# Patient Record
Sex: FEMALE | Race: WHITE | Employment: UNEMPLOYED | URBAN - METROPOLITAN AREA
[De-identification: names, ages, dates, MRNs, and addresses within clinical notes are randomized per-mention and may not be internally consistent; named-entity substitution may affect disease eponyms.]

---

## 2019-03-13 ENCOUNTER — HOSPITAL ENCOUNTER (OUTPATIENT)
Age: 29
Discharge: HOME | End: 2019-03-13
Payer: COMMERCIAL

## 2019-03-13 LAB
ANION GAP SERPL CALCULATED.3IONS-SCNC: 11 MMOL/L (ref 9–17)
BUN BLDV-MCNC: 8 MG/DL (ref 6–20)
BUN/CREAT BLD: ABNORMAL (ref 9–20)
CALCIUM SERPL-MCNC: 9.7 MG/DL (ref 8.6–10.4)
CHLORIDE BLD-SCNC: 105 MMOL/L (ref 98–107)
CO2: 26 MMOL/L (ref 20–31)
CREAT SERPL-MCNC: 0.68 MG/DL (ref 0.5–0.9)
GFR AFRICAN AMERICAN: >60 ML/MIN
GFR NON-AFRICAN AMERICAN: >60 ML/MIN
GFR SERPL CREATININE-BSD FRML MDRD: ABNORMAL ML/MIN/{1.73_M2}
GFR SERPL CREATININE-BSD FRML MDRD: ABNORMAL ML/MIN/{1.73_M2}
GLUCOSE BLD-MCNC: 107 MG/DL (ref 70–99)
POTASSIUM SERPL-SCNC: 4.4 MMOL/L (ref 3.7–5.3)
SODIUM BLD-SCNC: 142 MMOL/L (ref 135–144)
T3 FREE: 3.9 PG/ML (ref 2.02–4.43)
THYROXINE, FREE: 1.3 NG/DL (ref 0.93–1.7)
TSH SERPL DL<=0.05 MIU/L-ACNC: 1.48 MIU/L (ref 0.3–5)

## 2019-03-13 PROCEDURE — 36415 COLL VENOUS BLD VENIPUNCTURE: CPT

## 2019-03-13 PROCEDURE — 84439 ASSAY OF FREE THYROXINE: CPT

## 2019-03-13 PROCEDURE — 84443 ASSAY THYROID STIM HORMONE: CPT

## 2019-03-13 PROCEDURE — 80048 BASIC METABOLIC PNL TOTAL CA: CPT

## 2019-03-13 PROCEDURE — 84481 FREE ASSAY (FT-3): CPT

## 2019-06-11 ENCOUNTER — OFFICE VISIT (OUTPATIENT)
Dept: OBGYN CLINIC | Age: 29
End: 2019-06-11
Payer: COMMERCIAL

## 2019-06-11 ENCOUNTER — HOSPITAL ENCOUNTER (OUTPATIENT)
Age: 29
Discharge: HOME | End: 2019-06-11
Payer: COMMERCIAL

## 2019-06-11 VITALS
SYSTOLIC BLOOD PRESSURE: 98 MMHG | HEIGHT: 62 IN | WEIGHT: 107 LBS | HEART RATE: 62 BPM | BODY MASS INDEX: 19.69 KG/M2 | DIASTOLIC BLOOD PRESSURE: 60 MMHG

## 2019-06-11 DIAGNOSIS — O03.9 SAB (SPONTANEOUS ABORTION): Primary | ICD-10-CM

## 2019-06-11 DIAGNOSIS — O03.9 SAB (SPONTANEOUS ABORTION): ICD-10-CM

## 2019-06-11 LAB — HCG QUANTITATIVE: 6685 IU/L

## 2019-06-11 PROCEDURE — G8420 CALC BMI NORM PARAMETERS: HCPCS | Performed by: OBSTETRICS & GYNECOLOGY

## 2019-06-11 PROCEDURE — G8427 DOCREV CUR MEDS BY ELIG CLIN: HCPCS | Performed by: OBSTETRICS & GYNECOLOGY

## 2019-06-11 PROCEDURE — 99203 OFFICE O/P NEW LOW 30 MIN: CPT | Performed by: OBSTETRICS & GYNECOLOGY

## 2019-06-11 PROCEDURE — 84702 CHORIONIC GONADOTROPIN TEST: CPT

## 2019-06-11 PROCEDURE — 36415 COLL VENOUS BLD VENIPUNCTURE: CPT

## 2019-06-11 PROCEDURE — 4004F PT TOBACCO SCREEN RCVD TLK: CPT | Performed by: OBSTETRICS & GYNECOLOGY

## 2019-06-11 NOTE — PROGRESS NOTES
Calcium 9.7 8.6 - 10.4 mg/dL    Sodium 142 135 - 144 mmol/L    Potassium 4.4 3.7 - 5.3 mmol/L    Chloride 105 98 - 107 mmol/L    CO2 26 20 - 31 mmol/L    Anion Gap 11 9 - 17 mmol/L    GFR Non-African American >60 >60 mL/min    GFR African American >60 >60 mL/min    GFR Comment          GFR Staging NOT REPORTED    T3, Free   Result Value Ref Range    T3, Free 3.90 2.02 - 4.43 pg/mL   T4, Free   Result Value Ref Range    Thyroxine, Free 1.30 0.93 - 1.70 ng/dL   TSH without Reflex   Result Value Ref Range    TSH 1.48 0.30 - 5.00 mIU/L           Assessment:   Diagnosis Orders   1. SAB (spontaneous )  Type And Screen    HCG, Quantitative, Pregnancy     Chief Complaint   Patient presents with    New Patient         Patient Active Problem List    Diagnosis Date Noted    IBS (irritable bowel syndrome)        PLAN:  Return in about 9 months (around 3/11/2020) for Annual.   T&S ordered & Qhcg ordered  Family planning and conception-barrier recs  Stop Smoking-cessation reviewd  Annual completed by [facility] reviewed  Counseled on preventative health maintenance follow-up. Orders Placed This Encounter   Procedures    HCG, Quantitative, Pregnancy     Standing Status:   Future     Standing Expiration Date:   6/10/2020    Type And Screen     Standing Status:   Future     Standing Expiration Date:   2020       Patient was seen with total face to face time of 30 minutes. More than 50% of this visit was counseling and education regarding The encounter diagnosis was SAB (spontaneous ). and New Patient   as well as  counseling on preventative health maintenance follow-up.

## 2019-06-12 ENCOUNTER — TELEPHONE (OUTPATIENT)
Dept: OBGYN CLINIC | Age: 29
End: 2019-06-12

## 2019-06-12 DIAGNOSIS — O03.9 SPONTANEOUS ABORTION: Primary | ICD-10-CM

## 2019-06-12 DIAGNOSIS — Z34.90 EARLY STAGE OF PREGNANCY: ICD-10-CM

## 2019-06-14 ENCOUNTER — HOSPITAL ENCOUNTER (OUTPATIENT)
Age: 29
Discharge: HOME | End: 2019-06-14
Payer: COMMERCIAL

## 2019-06-14 DIAGNOSIS — O03.9 SPONTANEOUS ABORTION: ICD-10-CM

## 2019-06-14 LAB — HCG QUANTITATIVE: ABNORMAL IU/L

## 2019-06-14 PROCEDURE — 84702 CHORIONIC GONADOTROPIN TEST: CPT

## 2019-06-14 PROCEDURE — 36415 COLL VENOUS BLD VENIPUNCTURE: CPT

## 2019-06-17 ENCOUNTER — TELEPHONE (OUTPATIENT)
Dept: OBGYN CLINIC | Age: 29
End: 2019-06-17

## 2019-06-17 DIAGNOSIS — Z34.90 EARLY STAGE OF PREGNANCY: Primary | ICD-10-CM

## 2019-06-17 NOTE — TELEPHONE ENCOUNTER
Spoke with patient as requested by Dionne Shi CNP, in regards to positive blood pregnancy test and the indication for an ultrasound to determine viability and new ob intake appointment. Patient verbalized an understanding of results and plan of care. Transferred to  staff for scheduling. Ultrasound ordered. Patient stated that she will purchase OTC prenatal gummies, stating that she cannot take prescription prenatal pills.

## 2019-06-17 NOTE — TELEPHONE ENCOUNTER
----- Message from GRACIELA Reyes CNP sent at 6/17/2019  7:59 AM EDT -----  Please get OB ultrasound for fetal viability and dates. Prenatal vitamin 1 pO QD with 12 refills.   Patient will need new ob appointment scheduled

## 2019-06-17 NOTE — TELEPHONE ENCOUNTER
----- Message from GRACIELA Estevez CNP sent at 6/17/2019  7:59 AM EDT -----  Please get OB ultrasound for fetal viability and dates. Prenatal vitamin 1 pO QD with 12 refills.   Patient will need new ob appointment scheduled

## 2019-06-17 NOTE — TELEPHONE ENCOUNTER
Attempted to reach patient in regards to  need for OB ultrasound to determine fetal viability and dates, as requested by Nancy Rader CNP. Per provider, Prenatal vitamin 1 pO QD with 12 refills. Patient will need new ob appointment scheduled. Patient did not answer, voicemail full.  Currently awaiting call back from missed calls.

## 2019-06-19 ENCOUNTER — OFFICE VISIT (OUTPATIENT)
Dept: OBGYN CLINIC | Age: 29
End: 2019-06-19
Payer: COMMERCIAL

## 2019-06-19 DIAGNOSIS — Z34.90 EARLY STAGE OF PREGNANCY: ICD-10-CM

## 2019-06-19 LAB
CRL: NORMAL CM
SAC DIAMETER: NORMAL CM

## 2019-06-19 PROCEDURE — 76801 OB US < 14 WKS SINGLE FETUS: CPT | Performed by: OBSTETRICS & GYNECOLOGY

## 2019-06-20 ENCOUNTER — TELEPHONE (OUTPATIENT)
Dept: OBGYN CLINIC | Age: 29
End: 2019-06-20

## 2019-06-21 ENCOUNTER — TELEPHONE (OUTPATIENT)
Dept: OBGYN CLINIC | Age: 29
End: 2019-06-21

## 2019-06-21 RX ORDER — ONDANSETRON 4 MG/1
4 TABLET, FILM COATED ORAL EVERY 8 HOURS PRN
Qty: 12 TABLET | Refills: 0 | Status: SHIPPED | OUTPATIENT
Start: 2019-06-21 | End: 2019-07-15 | Stop reason: SDUPTHER

## 2019-06-21 NOTE — LETTER
[id number] [facility]  [address]  [address] [facility] [address]  Phone: [phone]  Fax: [phone] [facility], APRN - CNP        June 21, 2019    Vicky Sarah  [address]      To Whom it may concern:    Please allow patient to have a 15 minute break every 2 hours to allow her to get a drink and a snack due to pregnancy. If you have any questions or concerns, please don't hesitate to call.     Sincerely,        Patel Bowen, GRACIELA - CNP

## 2019-06-21 NOTE — TELEPHONE ENCOUNTER
Patient called to see if she could move her appt for her initial OB intake because she is having issues with nausea and vomiting. I told patient we like them to come in around 10 weeks for proper viability so she is keeping her appt. Patient states that she is keeping stuff down, but nothing sounds good. She states she is not getting proper breaks at work to be able to walk away to get something to drink or a frequent snack. 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 .. She was perscribed zofran her previous pregnancy by Dr. Gil Ganser and has tried b6 and unisom OTC which has not worked for her. Patient states she was also on a steroid from urgent care along with an ATB from dentist and she states that she is still having itching. .. She was placed on it because she had an allergic reaction and wants to know if there is anything she should or could do. Per Steve Zamora she can have 25mg of bendryl Q6H PRN for itching. Its okay to perscribe #12 tablets. Steve Zamora states she can do the zofran, but patient if aware of the risks of possible defects in early pregnancy associated with zofran. Per Steve Zamora zofran 4mg Q8hrs #12 tablets. Steve Zamora states a note for work is okay as well for snacks and drink break Q2hrs. Patient would like the note and zofran. Declines the benedryl.      The fax # to send the note is [phone]

## 2019-07-08 ENCOUNTER — HOSPITAL ENCOUNTER (EMERGENCY)
Age: 29
Discharge: HOME | End: 2019-07-08
Attending: EMERGENCY MEDICINE
Payer: COMMERCIAL

## 2019-07-08 VITALS
HEIGHT: 63 IN | WEIGHT: 105 LBS | RESPIRATION RATE: 16 BRPM | TEMPERATURE: 98.2 F | OXYGEN SATURATION: 100 % | BODY MASS INDEX: 18.61 KG/M2 | HEART RATE: 66 BPM | DIASTOLIC BLOOD PRESSURE: 57 MMHG | SYSTOLIC BLOOD PRESSURE: 92 MMHG

## 2019-07-08 DIAGNOSIS — R11.2 NON-INTRACTABLE VOMITING WITH NAUSEA, UNSPECIFIED VOMITING TYPE: Primary | ICD-10-CM

## 2019-07-08 LAB
ABSOLUTE EOS #: 0 K/UL (ref 0–0.4)
ABSOLUTE IMMATURE GRANULOCYTE: ABNORMAL K/UL (ref 0–0.3)
ABSOLUTE LYMPH #: 1.3 K/UL (ref 1–4.8)
ABSOLUTE MONO #: 0.8 K/UL (ref 0.1–1.3)
ALBUMIN SERPL-MCNC: 4.8 G/DL (ref 3.5–5.2)
ALBUMIN/GLOBULIN RATIO: NORMAL (ref 1–2.5)
ALP BLD-CCNC: 48 U/L (ref 35–104)
ALT SERPL-CCNC: 12 U/L (ref 5–33)
ANION GAP SERPL CALCULATED.3IONS-SCNC: 14 MMOL/L (ref 9–17)
AST SERPL-CCNC: 17 U/L
BASOPHILS # BLD: 1 % (ref 0–2)
BASOPHILS ABSOLUTE: 0.1 K/UL (ref 0–0.2)
BILIRUB SERPL-MCNC: 0.5 MG/DL (ref 0.3–1.2)
BUN BLDV-MCNC: 6 MG/DL (ref 6–20)
BUN/CREAT BLD: NORMAL (ref 9–20)
CALCIUM SERPL-MCNC: 10.1 MG/DL (ref 8.6–10.4)
CHLORIDE BLD-SCNC: 101 MMOL/L (ref 98–107)
CO2: 24 MMOL/L (ref 20–31)
CREAT SERPL-MCNC: 0.5 MG/DL (ref 0.5–0.9)
DIFFERENTIAL TYPE: ABNORMAL
EOSINOPHILS RELATIVE PERCENT: 0 % (ref 0–4)
GFR AFRICAN AMERICAN: >60 ML/MIN
GFR NON-AFRICAN AMERICAN: >60 ML/MIN
GFR SERPL CREATININE-BSD FRML MDRD: NORMAL ML/MIN/{1.73_M2}
GFR SERPL CREATININE-BSD FRML MDRD: NORMAL ML/MIN/{1.73_M2}
GLUCOSE BLD-MCNC: 99 MG/DL (ref 70–99)
HCT VFR BLD CALC: 43.9 % (ref 36–46)
HEMOGLOBIN: 14.9 G/DL (ref 12–16)
IMMATURE GRANULOCYTES: ABNORMAL %
LYMPHOCYTES # BLD: 14 % (ref 24–44)
MCH RBC QN AUTO: 30.5 PG (ref 26–34)
MCHC RBC AUTO-ENTMCNC: 34 G/DL (ref 31–37)
MCV RBC AUTO: 89.7 FL (ref 80–100)
MONOCYTES # BLD: 8 % (ref 1–7)
NRBC AUTOMATED: ABNORMAL PER 100 WBC
PDW BLD-RTO: 13.9 % (ref 11.5–14.9)
PLATELET # BLD: 189 K/UL (ref 150–450)
PLATELET ESTIMATE: ABNORMAL
PMV BLD AUTO: 8 FL (ref 6–12)
POTASSIUM SERPL-SCNC: 3.8 MMOL/L (ref 3.7–5.3)
RBC # BLD: 4.89 M/UL (ref 4–5.2)
RBC # BLD: ABNORMAL 10*6/UL
SEG NEUTROPHILS: 77 % (ref 36–66)
SEGMENTED NEUTROPHILS ABSOLUTE COUNT: 7.4 K/UL (ref 1.3–9.1)
SODIUM BLD-SCNC: 139 MMOL/L (ref 135–144)
TOTAL PROTEIN: 8 G/DL (ref 6.4–8.3)
WBC # BLD: 9.5 K/UL (ref 3.5–11)
WBC # BLD: ABNORMAL 10*3/UL

## 2019-07-08 PROCEDURE — 85025 COMPLETE CBC W/AUTO DIFF WBC: CPT

## 2019-07-08 PROCEDURE — 2580000003 HC RX 258: Performed by: EMERGENCY MEDICINE

## 2019-07-08 PROCEDURE — 80053 COMPREHEN METABOLIC PANEL: CPT

## 2019-07-08 PROCEDURE — 36415 COLL VENOUS BLD VENIPUNCTURE: CPT

## 2019-07-08 PROCEDURE — 96374 THER/PROPH/DIAG INJ IV PUSH: CPT

## 2019-07-08 PROCEDURE — 6360000002 HC RX W HCPCS: Performed by: EMERGENCY MEDICINE

## 2019-07-08 PROCEDURE — 99284 EMERGENCY DEPT VISIT MOD MDM: CPT

## 2019-07-08 RX ORDER — ONDANSETRON 2 MG/ML
4 INJECTION INTRAMUSCULAR; INTRAVENOUS ONCE
Status: COMPLETED | OUTPATIENT
Start: 2019-07-08 | End: 2019-07-08

## 2019-07-08 RX ORDER — 0.9 % SODIUM CHLORIDE 0.9 %
1000 INTRAVENOUS SOLUTION INTRAVENOUS ONCE
Status: COMPLETED | OUTPATIENT
Start: 2019-07-08 | End: 2019-07-08

## 2019-07-08 RX ADMIN — ONDANSETRON 4 MG: 2 INJECTION INTRAMUSCULAR; INTRAVENOUS at 10:44

## 2019-07-08 RX ADMIN — SODIUM CHLORIDE 1000 ML: 9 INJECTION, SOLUTION INTRAVENOUS at 10:44

## 2019-07-08 ASSESSMENT — ENCOUNTER SYMPTOMS
COUGH: 0
EYES NEGATIVE: 1
NAUSEA: 1
RESPIRATORY NEGATIVE: 1
SHORTNESS OF BREATH: 0
VOMITING: 1
BACK PAIN: 0

## 2019-07-08 NOTE — ED PROVIDER NOTES
EMERGENCY DEPARTMENT ENCOUNTER    Pt Name: Gaurav Nesbitt  MRN: [id number]  [place] 1990  Date of evaluation: 19  CHIEF COMPLAINT       Chief Complaint   Patient presents with    Nausea     10 weeks pregnant    Emesis    Rash     HISTORY OF PRESENT ILLNESS   The pt presents for evaluation of nausea and vomiting. Started about 10 weeks ago, pt is approx 10 weeks pregnant. She is taking zofran already but it is not helping much. No fever, no diarrhea. No vaginal bleeding or discharge. Pt has occ mild abd cramping. Prior us negative for ectopic. The history is provided by the patient. Nausea & Vomiting   Severity:  Moderate  Duration:  10 weeks  Timing:  Intermittent  Quality:  Stomach contents  Progression:  Unchanged  Chronicity:  New  Relieved by:  Nothing  Worsened by:  Nothing  Ineffective treatments:  Antiemetics  Associated symptoms: no chills, no cough, no fever and no headaches        REVIEW OF SYSTEMS     Review of Systems   Constitutional: Negative. Negative for chills and fever. HENT: Negative. Negative for congestion. Eyes: Negative. Respiratory: Negative. Negative for cough and shortness of breath. Cardiovascular: Negative. Negative for chest pain. Gastrointestinal: Positive for nausea and vomiting. Genitourinary: Negative. Musculoskeletal: Negative. Negative for back pain. Skin: Negative. Negative for rash. Neurological: Negative. Negative for headaches. All other systems reviewed and are negative.     PASTMEDICAL HISTORY     Past Medical History:   Diagnosis Date    IBS (irritable bowel syndrome)      SURGICAL HISTORY       Past Surgical History:   Procedure Laterality Date     SECTION       CURRENT MEDICATIONS       Discharge Medication List as of 2019 11:30 AM      CONTINUE these medications which have NOT CHANGED    Details   Prenatal MV-Min-Fe Fum-FA-DHA (PRENATAL 1 PO) Take by mouthHistorical Med      ondansetron (ZOFRAN) 4 MG

## 2019-07-10 ENCOUNTER — HOSPITAL ENCOUNTER (OUTPATIENT)
Age: 29
Discharge: HOME | End: 2019-07-10
Payer: COMMERCIAL

## 2019-07-10 ENCOUNTER — INITIAL PRENATAL (OUTPATIENT)
Dept: OBGYN CLINIC | Age: 29
End: 2019-07-10

## 2019-07-10 VITALS
SYSTOLIC BLOOD PRESSURE: 98 MMHG | DIASTOLIC BLOOD PRESSURE: 60 MMHG | BODY MASS INDEX: 18.25 KG/M2 | WEIGHT: 103 LBS | HEART RATE: 84 BPM

## 2019-07-10 DIAGNOSIS — O09.91 HIGH-RISK PREGNANCY IN FIRST TRIMESTER: Primary | ICD-10-CM

## 2019-07-10 DIAGNOSIS — Z34.90 EARLY STAGE OF PREGNANCY: Primary | ICD-10-CM

## 2019-07-10 DIAGNOSIS — Z34.90 EARLY STAGE OF PREGNANCY: ICD-10-CM

## 2019-07-10 PROBLEM — E03.8 TSH DEFICIENCY: Status: ACTIVE | Noted: 2019-07-10

## 2019-07-10 LAB
ABO/RH: NORMAL
ABSOLUTE EOS #: 0 K/UL (ref 0–0.4)
ABSOLUTE IMMATURE GRANULOCYTE: ABNORMAL K/UL (ref 0–0.3)
ABSOLUTE LYMPH #: 1.3 K/UL (ref 1–4.8)
ABSOLUTE MONO #: 0.7 K/UL (ref 0.1–1.3)
ANTIBODY SCREEN: NEGATIVE
BASOPHILS # BLD: 1 % (ref 0–2)
BASOPHILS ABSOLUTE: 0 K/UL (ref 0–0.2)
DIFFERENTIAL TYPE: ABNORMAL
EOSINOPHILS RELATIVE PERCENT: 0 % (ref 0–4)
GLUCOSE BLD-MCNC: 86 MG/DL (ref 70–99)
HCT VFR BLD CALC: 41.7 % (ref 36–46)
HEMOGLOBIN: 14.1 G/DL (ref 12–16)
HEPATITIS B SURFACE ANTIGEN: NONREACTIVE
HIV AG/AB: NONREACTIVE
IMMATURE GRANULOCYTES: ABNORMAL %
LYMPHOCYTES # BLD: 17 % (ref 24–44)
MCH RBC QN AUTO: 30.8 PG (ref 26–34)
MCHC RBC AUTO-ENTMCNC: 33.8 G/DL (ref 31–37)
MCV RBC AUTO: 91.2 FL (ref 80–100)
MONOCYTES # BLD: 9 % (ref 1–7)
NRBC AUTOMATED: ABNORMAL PER 100 WBC
PDW BLD-RTO: 13.8 % (ref 11.5–14.9)
PLATELET # BLD: 176 K/UL (ref 150–450)
PLATELET ESTIMATE: ABNORMAL
PMV BLD AUTO: 8.2 FL (ref 6–12)
RBC # BLD: 4.58 M/UL (ref 4–5.2)
RBC # BLD: ABNORMAL 10*6/UL
RUBV IGG SER QL: 171.9 IU/ML
SEG NEUTROPHILS: 73 % (ref 36–66)
SEGMENTED NEUTROPHILS ABSOLUTE COUNT: 5.6 K/UL (ref 1.3–9.1)
THYROXINE, FREE: 1.34 NG/DL (ref 0.93–1.7)
TSH SERPL DL<=0.05 MIU/L-ACNC: 0.12 MIU/L (ref 0.3–5)
WBC # BLD: 7.6 K/UL (ref 3.5–11)
WBC # BLD: ABNORMAL 10*3/UL

## 2019-07-10 PROCEDURE — 86850 RBC ANTIBODY SCREEN: CPT

## 2019-07-10 PROCEDURE — 84443 ASSAY THYROID STIM HORMONE: CPT

## 2019-07-10 PROCEDURE — 86901 BLOOD TYPING SEROLOGIC RH(D): CPT

## 2019-07-10 PROCEDURE — 36415 COLL VENOUS BLD VENIPUNCTURE: CPT

## 2019-07-10 PROCEDURE — 87086 URINE CULTURE/COLONY COUNT: CPT

## 2019-07-10 PROCEDURE — 86762 RUBELLA ANTIBODY: CPT

## 2019-07-10 PROCEDURE — 85025 COMPLETE CBC W/AUTO DIFF WBC: CPT

## 2019-07-10 PROCEDURE — 84439 ASSAY OF FREE THYROXINE: CPT

## 2019-07-10 PROCEDURE — 82947 ASSAY GLUCOSE BLOOD QUANT: CPT

## 2019-07-10 PROCEDURE — 87389 HIV-1 AG W/HIV-1&-2 AB AG IA: CPT

## 2019-07-10 PROCEDURE — 86780 TREPONEMA PALLIDUM: CPT

## 2019-07-10 PROCEDURE — 86900 BLOOD TYPING SEROLOGIC ABO: CPT

## 2019-07-10 PROCEDURE — 87340 HEPATITIS B SURFACE AG IA: CPT

## 2019-07-11 LAB
CULTURE: NO GROWTH
Lab: NORMAL
SPECIMEN DESCRIPTION: NORMAL
T. PALLIDUM, IGG: NONREACTIVE

## 2019-07-15 RX ORDER — ONDANSETRON 4 MG/1
4 TABLET, FILM COATED ORAL EVERY 8 HOURS PRN
Qty: 20 TABLET | Refills: 0 | Status: SHIPPED | OUTPATIENT
Start: 2019-07-15 | End: 2019-08-01 | Stop reason: SDUPTHER

## 2019-07-17 ENCOUNTER — HOSPITAL ENCOUNTER (OUTPATIENT)
Age: 29
Setting detail: SPECIMEN
Discharge: HOME | End: 2019-07-17
Payer: COMMERCIAL

## 2019-07-17 ENCOUNTER — INITIAL PRENATAL (OUTPATIENT)
Dept: OBGYN CLINIC | Age: 29
End: 2019-07-17

## 2019-07-17 VITALS
SYSTOLIC BLOOD PRESSURE: 90 MMHG | HEART RATE: 78 BPM | DIASTOLIC BLOOD PRESSURE: 60 MMHG | BODY MASS INDEX: 18.42 KG/M2 | WEIGHT: 104 LBS

## 2019-07-17 DIAGNOSIS — Z3A.11 11 WEEKS GESTATION OF PREGNANCY: ICD-10-CM

## 2019-07-17 DIAGNOSIS — E03.8 TSH DEFICIENCY: ICD-10-CM

## 2019-07-17 DIAGNOSIS — Z34.90 EARLY STAGE OF PREGNANCY: Primary | ICD-10-CM

## 2019-07-17 DIAGNOSIS — Z34.90 EARLY STAGE OF PREGNANCY: ICD-10-CM

## 2019-07-17 LAB
-: NORMAL
REASON FOR REJECTION: NORMAL
ZZ NTE CLEAN UP: ORDERED TEST: NORMAL
ZZ NTE WITH NAME CLEAN UP: SPECIMEN SOURCE: NORMAL

## 2019-07-17 PROCEDURE — 0502F SUBSEQUENT PRENATAL CARE: CPT | Performed by: OBSTETRICS & GYNECOLOGY

## 2019-07-17 PROCEDURE — 87491 CHLMYD TRACH DNA AMP PROBE: CPT

## 2019-07-17 PROCEDURE — 87591 N.GONORRHOEAE DNA AMP PROB: CPT

## 2019-07-17 NOTE — PROGRESS NOTES
Per Dr Patricio Miller pt given Maryana  Samples X 4 bottles lot# [id number] exp 8/31/21
for patients over 24years of age. Cultures were collected. The patient was counseled on office policies and she was counseled on termination of pregnancy in the state of [place]. The patient was counseled on Toxoplasmosis, HIV, Tobacco Abuse, Group Beta Strep Infections, Cystic Fibrosis,  Labor precautions and Sickle Cell disease. The patient was counseled on the risks of tobacco abuse. Both maternal and fetal. She was instructed to stop smoking if currently using tobacco. Morbidity, mortality, and cessation programs were reviewed. The risks include but are not limited to increased risks of  labor,  delivery, premature rupture of membranes, intrauterine growth restriction, intrauterine fetal demise and abruptio placenta. Secondary smoke risks were also reviewed. Increases in cancer, respiratory problems, and sudden infant death syndrome were reviewed as well. The patient was informed of a 2-4% risk of congenital anomalies in the general population. She was also informed that karyotyping is the only way to evaluate the fetus for genetic problems and genetic lethal anomalies. Chorionic villous sampling, amniocentesis and Maternal Genetic Blood Sampling-(NIPT Testing) were also discussed with morbidity rates in detail. She declined any of the options. Route of delivery and counseling on vaginal, operative vaginal, and  sections were completed with the risks of each to both the patient as well as her baby. The possibility of a blood transfusion was discussed as well. The patient was not opposed to receiving a transfusion if needed. Nuchal translucency and MSAFP single marker testing was reviewed in detail with attention to timing of testing and their windows. For patients beyond the gestational age for Nuchal translucency evaluation Quad testing was recommended. Timing for the Quad test was reviewed. Benefits of the above testing was reviewed.  A second trimester amniocentesis

## 2019-07-18 LAB
C TRACH DNA GENITAL QL NAA+PROBE: NEGATIVE
N. GONORRHOEAE DNA: NEGATIVE
SPECIMEN DESCRIPTION: NORMAL

## 2019-07-26 ENCOUNTER — ROUTINE PRENATAL (OUTPATIENT)
Dept: PERINATAL CARE | Age: 29
End: 2019-07-26
Payer: COMMERCIAL

## 2019-07-26 VITALS
HEIGHT: 63 IN | RESPIRATION RATE: 16 BRPM | WEIGHT: 103.5 LBS | DIASTOLIC BLOOD PRESSURE: 65 MMHG | TEMPERATURE: 98.8 F | BODY MASS INDEX: 18.34 KG/M2 | HEART RATE: 88 BPM | SYSTOLIC BLOOD PRESSURE: 97 MMHG

## 2019-07-26 DIAGNOSIS — O99.330 TOBACCO USE DISORDER COMPLICATING PREGNANCY, CHILDBIRTH, OR PUERPERIUM, ANTEPARTUM: ICD-10-CM

## 2019-07-26 DIAGNOSIS — O36.80X0 ENCOUNTER TO DETERMINE FETAL VIABILITY OF PREGNANCY, SINGLE OR UNSPECIFIED FETUS: ICD-10-CM

## 2019-07-26 DIAGNOSIS — O26.11 LOW WEIGHT GAIN DURING PREGNANCY IN FIRST TRIMESTER: ICD-10-CM

## 2019-07-26 DIAGNOSIS — Z3A.12 12 WEEKS GESTATION OF PREGNANCY: ICD-10-CM

## 2019-07-26 DIAGNOSIS — Z36.9 FIRST TRIMESTER SCREENING: Primary | ICD-10-CM

## 2019-07-26 PROCEDURE — 76801 OB US < 14 WKS SINGLE FETUS: CPT | Performed by: OBSTETRICS & GYNECOLOGY

## 2019-07-26 PROCEDURE — 76813 OB US NUCHAL MEAS 1 GEST: CPT | Performed by: OBSTETRICS & GYNECOLOGY

## 2019-08-01 RX ORDER — ONDANSETRON 4 MG/1
4 TABLET, FILM COATED ORAL EVERY 8 HOURS PRN
Qty: 30 TABLET | Refills: 1 | Status: SHIPPED | OUTPATIENT
Start: 2019-08-01 | End: 2019-09-27 | Stop reason: SDUPTHER

## 2019-08-14 ENCOUNTER — HOSPITAL ENCOUNTER (OUTPATIENT)
Age: 29
Setting detail: SPECIMEN
Discharge: HOME | End: 2019-08-14
Payer: COMMERCIAL

## 2019-08-14 ENCOUNTER — ROUTINE PRENATAL (OUTPATIENT)
Dept: OBGYN CLINIC | Age: 29
End: 2019-08-14

## 2019-08-14 VITALS
BODY MASS INDEX: 18.07 KG/M2 | DIASTOLIC BLOOD PRESSURE: 62 MMHG | WEIGHT: 102 LBS | HEART RATE: 60 BPM | SYSTOLIC BLOOD PRESSURE: 100 MMHG

## 2019-08-14 DIAGNOSIS — E03.8 TSH DEFICIENCY: ICD-10-CM

## 2019-08-14 DIAGNOSIS — Z3A.15 15 WEEKS GESTATION OF PREGNANCY: ICD-10-CM

## 2019-08-14 DIAGNOSIS — Z3A.15 15 WEEKS GESTATION OF PREGNANCY: Primary | ICD-10-CM

## 2019-08-14 PROCEDURE — G0145 SCR C/V CYTO,THINLAYER,RESCR: HCPCS

## 2019-08-14 PROCEDURE — 87070 CULTURE OTHR SPECIMN AEROBIC: CPT

## 2019-08-14 PROCEDURE — 0502F SUBSEQUENT PRENATAL CARE: CPT | Performed by: NURSE PRACTITIONER

## 2019-08-14 RX ORDER — LANOLIN ALCOHOL/MO/W.PET/CERES
50 CREAM (GRAM) TOPICAL 2 TIMES DAILY
Qty: 60 TABLET | Refills: 3 | Status: SHIPPED | OUTPATIENT
Start: 2019-08-14 | End: 2019-10-04

## 2019-08-17 LAB
CULTURE: NORMAL
Lab: NORMAL
SPECIMEN DESCRIPTION: NORMAL

## 2019-08-20 ENCOUNTER — HOSPITAL ENCOUNTER (OUTPATIENT)
Age: 29
Discharge: HOME | End: 2019-08-20
Payer: COMMERCIAL

## 2019-08-20 DIAGNOSIS — Z3A.15 15 WEEKS GESTATION OF PREGNANCY: ICD-10-CM

## 2019-08-20 PROCEDURE — 82105 ALPHA-FETOPROTEIN SERUM: CPT

## 2019-08-20 PROCEDURE — 36415 COLL VENOUS BLD VENIPUNCTURE: CPT

## 2019-08-21 ENCOUNTER — TELEPHONE (OUTPATIENT)
Dept: OBGYN CLINIC | Age: 29
End: 2019-08-21

## 2019-08-21 LAB — CYTOLOGY REPORT: NORMAL

## 2019-08-21 NOTE — TELEPHONE ENCOUNTER
----- Message from GRACIELA Schaefer CNP sent at 8/21/2019  2:57 PM EDT -----  Pap smear neg  AGE 29  Flagyl 500mg PO BID X 7 days

## 2019-08-21 NOTE — TELEPHONE ENCOUNTER
LM for pt to call office regarding results. Pt with negative pap and +BV on pap. Pt will need flagyl sent to pt pharm. Pt currently 16 weeks.

## 2019-08-22 ENCOUNTER — TELEPHONE (OUTPATIENT)
Dept: OBGYN CLINIC | Age: 29
End: 2019-08-22

## 2019-08-22 RX ORDER — METRONIDAZOLE 500 MG/1
500 TABLET ORAL 2 TIMES DAILY
Qty: 14 TABLET | Refills: 0 | Status: SHIPPED | OUTPATIENT
Start: 2019-08-22 | End: 2019-08-29

## 2019-08-23 LAB
AFP INTERPRETATION: NORMAL
AFP MOM: 2.07
AFP SPECIMEN: NORMAL
AFP: 90 NG/ML
DATE OF BIRTH: NORMAL
DATING METHOD: NORMAL
DETERMINED BY: NORMAL
DIABETIC: NO
DUE DATE: NORMAL
ESTIMATED DUE DATE: NORMAL
FAMILY HISTORY NTD: NO
GESTATIONAL AGE: NORMAL
INSULIN REQ DIABETES: NO
LAST MENSTRUAL PERIOD: NORMAL
MATERNAL AGE AT EDD: 29.9 YR
MATERNAL WEIGHT: 103
MONOCHORIONIC TWINS: NO
NUMBER OF FETUSES: NORMAL
PATIENT WEIGHT UNITS: NORMAL
PATIENT WEIGHT: NORMAL
RACE (MATERNAL): NORMAL
RACE: NORMAL
REPEAT SPECIMEN?: NO
SMOKING: YES
SMOKING: YES
VALPROIC/CARBAMAZEP: NO
ZZ NTE CLEAN UP: HISTORY: NO

## 2019-09-12 ENCOUNTER — ROUTINE PRENATAL (OUTPATIENT)
Dept: OBGYN CLINIC | Age: 29
End: 2019-09-12

## 2019-09-12 VITALS
BODY MASS INDEX: 18.6 KG/M2 | DIASTOLIC BLOOD PRESSURE: 58 MMHG | SYSTOLIC BLOOD PRESSURE: 96 MMHG | WEIGHT: 105 LBS | HEART RATE: 64 BPM

## 2019-09-12 DIAGNOSIS — Z3A.19 19 WEEKS GESTATION OF PREGNANCY: ICD-10-CM

## 2019-09-12 DIAGNOSIS — O09.92 HIGH-RISK PREGNANCY IN SECOND TRIMESTER: Primary | ICD-10-CM

## 2019-09-12 DIAGNOSIS — E03.8 TSH DEFICIENCY: ICD-10-CM

## 2019-09-12 PROCEDURE — 0502F SUBSEQUENT PRENATAL CARE: CPT | Performed by: OBSTETRICS & GYNECOLOGY

## 2019-09-12 RX ORDER — FAMOTIDINE 20 MG/1
20 TABLET, FILM COATED ORAL 2 TIMES DAILY
Qty: 60 TABLET | Refills: 3 | Status: SHIPPED | OUTPATIENT
Start: 2019-09-12 | End: 2020-01-02

## 2019-09-19 ENCOUNTER — ROUTINE PRENATAL (OUTPATIENT)
Dept: PERINATAL CARE | Age: 29
End: 2019-09-19
Payer: COMMERCIAL

## 2019-09-19 VITALS
WEIGHT: 107 LBS | RESPIRATION RATE: 16 BRPM | BODY MASS INDEX: 18.96 KG/M2 | SYSTOLIC BLOOD PRESSURE: 92 MMHG | TEMPERATURE: 98.1 F | HEIGHT: 63 IN | DIASTOLIC BLOOD PRESSURE: 58 MMHG | HEART RATE: 80 BPM

## 2019-09-19 DIAGNOSIS — Z3A.20 20 WEEKS GESTATION OF PREGNANCY: ICD-10-CM

## 2019-09-19 DIAGNOSIS — O26.12 LOW WEIGHT GAIN DURING PREGNANCY IN SECOND TRIMESTER: Primary | ICD-10-CM

## 2019-09-19 DIAGNOSIS — Z36.86 ENCOUNTER FOR SCREENING FOR RISK OF PRE-TERM LABOR: ICD-10-CM

## 2019-09-19 DIAGNOSIS — O99.330 TOBACCO USE DISORDER COMPLICATING PREGNANCY, CHILDBIRTH, OR PUERPERIUM, ANTEPARTUM: ICD-10-CM

## 2019-09-19 PROCEDURE — 76817 TRANSVAGINAL US OBSTETRIC: CPT | Performed by: OBSTETRICS & GYNECOLOGY

## 2019-09-19 PROCEDURE — 76811 OB US DETAILED SNGL FETUS: CPT | Performed by: OBSTETRICS & GYNECOLOGY

## 2019-09-27 ENCOUNTER — TELEPHONE (OUTPATIENT)
Dept: OBGYN CLINIC | Age: 29
End: 2019-09-27

## 2019-09-27 RX ORDER — ONDANSETRON 4 MG/1
4 TABLET, FILM COATED ORAL EVERY 8 HOURS PRN
Qty: 30 TABLET | Refills: 0 | Status: SHIPPED | OUTPATIENT
Start: 2019-09-27 | End: 2019-10-28 | Stop reason: SDUPTHER

## 2019-10-03 ENCOUNTER — TELEPHONE (OUTPATIENT)
Dept: OBGYN CLINIC | Age: 29
End: 2019-10-03

## 2019-10-04 ENCOUNTER — ROUTINE PRENATAL (OUTPATIENT)
Dept: OBGYN CLINIC | Age: 29
End: 2019-10-04

## 2019-10-04 ENCOUNTER — HOSPITAL ENCOUNTER (OUTPATIENT)
Age: 29
Setting detail: SPECIMEN
Discharge: HOME | End: 2019-10-04
Payer: COMMERCIAL

## 2019-10-04 VITALS
WEIGHT: 108 LBS | BODY MASS INDEX: 19.13 KG/M2 | SYSTOLIC BLOOD PRESSURE: 90 MMHG | HEART RATE: 84 BPM | DIASTOLIC BLOOD PRESSURE: 60 MMHG

## 2019-10-04 DIAGNOSIS — R10.2 PELVIC PAIN AFFECTING PREGNANCY IN SECOND TRIMESTER, ANTEPARTUM: ICD-10-CM

## 2019-10-04 DIAGNOSIS — E03.8 TSH DEFICIENCY: ICD-10-CM

## 2019-10-04 DIAGNOSIS — Z3A.22 22 WEEKS GESTATION OF PREGNANCY: Primary | ICD-10-CM

## 2019-10-04 DIAGNOSIS — O26.892 PELVIC PAIN AFFECTING PREGNANCY IN SECOND TRIMESTER, ANTEPARTUM: ICD-10-CM

## 2019-10-04 LAB
-: NORMAL
DIRECT EXAM: ABNORMAL
Lab: ABNORMAL
REASON FOR REJECTION: NORMAL
SPECIMEN DESCRIPTION: ABNORMAL
ZZ NTE CLEAN UP: ORDERED TEST: NORMAL
ZZ NTE WITH NAME CLEAN UP: SPECIMEN SOURCE: NORMAL

## 2019-10-04 PROCEDURE — 87510 GARDNER VAG DNA DIR PROBE: CPT

## 2019-10-04 PROCEDURE — 87480 CANDIDA DNA DIR PROBE: CPT

## 2019-10-04 PROCEDURE — 87591 N.GONORRHOEAE DNA AMP PROB: CPT

## 2019-10-04 PROCEDURE — 0502F SUBSEQUENT PRENATAL CARE: CPT | Performed by: NURSE PRACTITIONER

## 2019-10-04 PROCEDURE — 87660 TRICHOMONAS VAGIN DIR PROBE: CPT

## 2019-10-04 PROCEDURE — 87491 CHLMYD TRACH DNA AMP PROBE: CPT

## 2019-10-07 ENCOUNTER — TELEPHONE (OUTPATIENT)
Dept: OBGYN CLINIC | Age: 29
End: 2019-10-07

## 2019-10-07 RX ORDER — METRONIDAZOLE 7.5 MG/G
1 GEL VAGINAL NIGHTLY
Qty: 1 TUBE | Refills: 0 | Status: SHIPPED | OUTPATIENT
Start: 2019-10-07 | End: 2019-10-12

## 2019-10-08 DIAGNOSIS — R10.2 PELVIC PAIN IN PREGNANCY: Primary | ICD-10-CM

## 2019-10-08 DIAGNOSIS — O26.899 PELVIC PAIN IN PREGNANCY: Primary | ICD-10-CM

## 2019-10-10 ENCOUNTER — ROUTINE PRENATAL (OUTPATIENT)
Dept: OBGYN CLINIC | Age: 29
End: 2019-10-10
Payer: COMMERCIAL

## 2019-10-10 VITALS
SYSTOLIC BLOOD PRESSURE: 108 MMHG | DIASTOLIC BLOOD PRESSURE: 66 MMHG | WEIGHT: 110 LBS | BODY MASS INDEX: 19.49 KG/M2 | HEART RATE: 80 BPM

## 2019-10-10 DIAGNOSIS — Z23 NEED FOR INFLUENZA VACCINATION: ICD-10-CM

## 2019-10-10 DIAGNOSIS — E03.8 TSH DEFICIENCY: ICD-10-CM

## 2019-10-10 DIAGNOSIS — Z3A.23 23 WEEKS GESTATION OF PREGNANCY: Primary | ICD-10-CM

## 2019-10-10 PROCEDURE — 90686 IIV4 VACC NO PRSV 0.5 ML IM: CPT | Performed by: ADVANCED PRACTICE MIDWIFE

## 2019-10-10 PROCEDURE — 90471 IMMUNIZATION ADMIN: CPT | Performed by: ADVANCED PRACTICE MIDWIFE

## 2019-10-10 PROCEDURE — 0502F SUBSEQUENT PRENATAL CARE: CPT | Performed by: ADVANCED PRACTICE MIDWIFE

## 2019-10-28 ENCOUNTER — TELEPHONE (OUTPATIENT)
Dept: OBGYN CLINIC | Age: 29
End: 2019-10-28

## 2019-10-28 RX ORDER — ONDANSETRON 4 MG/1
4 TABLET, FILM COATED ORAL EVERY 8 HOURS PRN
Qty: 30 TABLET | Refills: 0 | Status: SHIPPED | OUTPATIENT
Start: 2019-10-28 | End: 2019-12-02 | Stop reason: SDUPTHER

## 2019-11-07 ENCOUNTER — ROUTINE PRENATAL (OUTPATIENT)
Dept: OBGYN CLINIC | Age: 29
End: 2019-11-07
Payer: COMMERCIAL

## 2019-11-07 VITALS
SYSTOLIC BLOOD PRESSURE: 104 MMHG | HEART RATE: 95 BPM | WEIGHT: 112 LBS | DIASTOLIC BLOOD PRESSURE: 60 MMHG | BODY MASS INDEX: 19.84 KG/M2

## 2019-11-07 DIAGNOSIS — Z3A.27 27 WEEKS GESTATION OF PREGNANCY: ICD-10-CM

## 2019-11-07 DIAGNOSIS — Z23 NEED FOR PROPHYLACTIC VACCINATION WITH COMBINED DIPHTHERIA-TETANUS-PERTUSSIS (DTP) VACCINE: ICD-10-CM

## 2019-11-07 DIAGNOSIS — O09.93 HIGH-RISK PREGNANCY IN THIRD TRIMESTER: Primary | ICD-10-CM

## 2019-11-07 DIAGNOSIS — E03.8 TSH DEFICIENCY: ICD-10-CM

## 2019-11-07 PROCEDURE — 90471 IMMUNIZATION ADMIN: CPT | Performed by: OBSTETRICS & GYNECOLOGY

## 2019-11-07 PROCEDURE — 90715 TDAP VACCINE 7 YRS/> IM: CPT | Performed by: OBSTETRICS & GYNECOLOGY

## 2019-11-07 PROCEDURE — 0502F SUBSEQUENT PRENATAL CARE: CPT | Performed by: OBSTETRICS & GYNECOLOGY

## 2019-11-11 ENCOUNTER — HOSPITAL ENCOUNTER (OUTPATIENT)
Age: 29
Discharge: HOME | End: 2019-11-11
Payer: COMMERCIAL

## 2019-11-11 ENCOUNTER — TELEPHONE (OUTPATIENT)
Dept: OBGYN CLINIC | Age: 29
End: 2019-11-11

## 2019-11-11 DIAGNOSIS — O09.93 HIGH-RISK PREGNANCY IN THIRD TRIMESTER: ICD-10-CM

## 2019-11-11 DIAGNOSIS — Z3A.27 27 WEEKS GESTATION OF PREGNANCY: ICD-10-CM

## 2019-11-11 DIAGNOSIS — E03.8 TSH DEFICIENCY: ICD-10-CM

## 2019-11-11 DIAGNOSIS — O99.810 ABNORMAL GLUCOSE TOLERANCE IN PREGNANCY: Primary | ICD-10-CM

## 2019-11-11 LAB
ABSOLUTE EOS #: 0 K/UL (ref 0–0.4)
ABSOLUTE IMMATURE GRANULOCYTE: ABNORMAL K/UL (ref 0–0.3)
ABSOLUTE LYMPH #: 2.28 K/UL (ref 1–4.8)
ABSOLUTE MONO #: 0.76 K/UL (ref 0.1–1.3)
BASOPHILS # BLD: 0 % (ref 0–2)
BASOPHILS ABSOLUTE: 0 K/UL (ref 0–0.2)
DIFFERENTIAL TYPE: ABNORMAL
EOSINOPHILS RELATIVE PERCENT: 0 % (ref 0–4)
GLUCOSE ADMINISTRATION: ABNORMAL
GLUCOSE TOLERANCE SCREEN 50G: 158 MG/DL (ref 70–135)
HCT VFR BLD CALC: 35.7 % (ref 36–46)
HEMOGLOBIN: 11.7 G/DL (ref 12–16)
IMMATURE GRANULOCYTES: ABNORMAL %
LYMPHOCYTES # BLD: 15 % (ref 24–44)
MCH RBC QN AUTO: 30.8 PG (ref 26–34)
MCHC RBC AUTO-ENTMCNC: 32.7 G/DL (ref 31–37)
MCV RBC AUTO: 94.2 FL (ref 80–100)
MONOCYTES # BLD: 5 % (ref 1–7)
MORPHOLOGY: ABNORMAL
MORPHOLOGY: ABNORMAL
NRBC AUTOMATED: ABNORMAL PER 100 WBC
PDW BLD-RTO: 14.6 % (ref 11.5–14.9)
PLATELET # BLD: 218 K/UL (ref 150–450)
PLATELET ESTIMATE: ABNORMAL
PMV BLD AUTO: 8.4 FL (ref 6–12)
RBC # BLD: 3.79 M/UL (ref 4–5.2)
RBC # BLD: ABNORMAL 10*6/UL
SEG NEUTROPHILS: 80 % (ref 36–66)
SEGMENTED NEUTROPHILS ABSOLUTE COUNT: 12.16 K/UL (ref 1.3–9.1)
WBC # BLD: 15.2 K/UL (ref 3.5–11)
WBC # BLD: ABNORMAL 10*3/UL

## 2019-11-11 PROCEDURE — 82950 GLUCOSE TEST: CPT

## 2019-11-11 PROCEDURE — 87086 URINE CULTURE/COLONY COUNT: CPT

## 2019-11-11 PROCEDURE — 85025 COMPLETE CBC W/AUTO DIFF WBC: CPT

## 2019-11-11 PROCEDURE — 36415 COLL VENOUS BLD VENIPUNCTURE: CPT

## 2019-11-12 LAB
CULTURE: NO GROWTH
Lab: NORMAL
SPECIMEN DESCRIPTION: NORMAL

## 2019-11-13 ENCOUNTER — HOSPITAL ENCOUNTER (OUTPATIENT)
Age: 29
Discharge: HOME | End: 2019-11-13
Payer: COMMERCIAL

## 2019-11-13 ENCOUNTER — TELEPHONE (OUTPATIENT)
Dept: OBGYN CLINIC | Age: 29
End: 2019-11-13

## 2019-11-13 DIAGNOSIS — O99.810 ABNORMAL GLUCOSE TOLERANCE IN PREGNANCY: ICD-10-CM

## 2019-11-13 DIAGNOSIS — O24.410 DIET CONTROLLED GESTATIONAL DIABETES MELLITUS (GDM), ANTEPARTUM: Primary | ICD-10-CM

## 2019-11-13 PROBLEM — O24.419 GESTATIONAL DIABETES MELLITUS (GDM), ANTEPARTUM: Status: ACTIVE | Noted: 2019-11-13

## 2019-11-13 LAB
AMOUNT GLUCOSE GIVEN: 100 G
ESTIMATED AVERAGE GLUCOSE: 94 MG/DL
GLUCOSE FASTING: 90 MG/DL (ref 65–99)
GLUCOSE TOLERANCE TEST 1 HOUR: 207 MG/DL (ref 65–184)
GLUCOSE TOLERANCE TEST 2 HOUR: 171 MG/DL (ref 65–139)
GLUCOSE TOLERANCE TEST 3 HOUR: 141 MG/DL (ref 65–130)
HBA1C MFR BLD: 4.9 % (ref 4–6)

## 2019-11-13 PROCEDURE — 36415 COLL VENOUS BLD VENIPUNCTURE: CPT

## 2019-11-13 PROCEDURE — 82951 GLUCOSE TOLERANCE TEST (GTT): CPT

## 2019-11-13 PROCEDURE — 82952 GTT-ADDED SAMPLES: CPT

## 2019-11-13 PROCEDURE — 83036 HEMOGLOBIN GLYCOSYLATED A1C: CPT

## 2019-11-14 ENCOUNTER — TELEPHONE (OUTPATIENT)
Dept: PERINATAL CARE | Age: 29
End: 2019-11-14

## 2019-11-14 DIAGNOSIS — O99.810 ABNORMAL GLUCOSE TOLERANCE TEST (GTT) DURING PREGNANCY, ANTEPARTUM: Primary | ICD-10-CM

## 2019-11-21 ENCOUNTER — HOSPITAL ENCOUNTER (OUTPATIENT)
Dept: DIABETES SERVICES | Age: 29
Setting detail: THERAPIES SERIES
Discharge: HOME | End: 2019-11-21
Payer: COMMERCIAL

## 2019-11-21 VITALS — WEIGHT: 115.3 LBS | BODY MASS INDEX: 20.42 KG/M2

## 2019-11-21 PROCEDURE — G0108 DIAB MANAGE TRN  PER INDIV: HCPCS

## 2019-11-27 ENCOUNTER — ROUTINE PRENATAL (OUTPATIENT)
Dept: OBGYN CLINIC | Age: 29
End: 2019-11-27

## 2019-11-27 VITALS
WEIGHT: 112 LBS | DIASTOLIC BLOOD PRESSURE: 71 MMHG | SYSTOLIC BLOOD PRESSURE: 104 MMHG | BODY MASS INDEX: 19.84 KG/M2 | HEART RATE: 86 BPM

## 2019-11-27 DIAGNOSIS — E03.8 TSH DEFICIENCY: ICD-10-CM

## 2019-11-27 DIAGNOSIS — O09.93 HIGH-RISK PREGNANCY IN THIRD TRIMESTER: ICD-10-CM

## 2019-11-27 DIAGNOSIS — Z3A.30 30 WEEKS GESTATION OF PREGNANCY: Primary | ICD-10-CM

## 2019-11-27 DIAGNOSIS — O24.410 DIET CONTROLLED GESTATIONAL DIABETES MELLITUS (GDM), ANTEPARTUM: ICD-10-CM

## 2019-11-27 DIAGNOSIS — O99.810 ABNORMAL GLUCOSE TOLERANCE TEST (GTT) DURING PREGNANCY, ANTEPARTUM: ICD-10-CM

## 2019-11-27 PROCEDURE — 0502F SUBSEQUENT PRENATAL CARE: CPT | Performed by: NURSE PRACTITIONER

## 2019-12-02 ENCOUNTER — ROUTINE PRENATAL (OUTPATIENT)
Dept: PERINATAL CARE | Age: 29
End: 2019-12-02
Payer: COMMERCIAL

## 2019-12-02 ENCOUNTER — TELEPHONE (OUTPATIENT)
Dept: OBGYN CLINIC | Age: 29
End: 2019-12-02

## 2019-12-02 ENCOUNTER — TELEPHONE (OUTPATIENT)
Dept: PERINATAL CARE | Age: 29
End: 2019-12-02

## 2019-12-02 VITALS
WEIGHT: 114.5 LBS | RESPIRATION RATE: 16 BRPM | SYSTOLIC BLOOD PRESSURE: 100 MMHG | HEIGHT: 63 IN | DIASTOLIC BLOOD PRESSURE: 60 MMHG | BODY MASS INDEX: 20.29 KG/M2 | TEMPERATURE: 98.1 F | HEART RATE: 87 BPM

## 2019-12-02 DIAGNOSIS — Z3A.31 31 WEEKS GESTATION OF PREGNANCY: ICD-10-CM

## 2019-12-02 DIAGNOSIS — Z13.89 ENCOUNTER FOR ROUTINE SCREENING FOR MALFORMATION USING ULTRASONICS: ICD-10-CM

## 2019-12-02 DIAGNOSIS — O24.414 INSULIN CONTROLLED GESTATIONAL DIABETES MELLITUS (GDM) DURING PREGNANCY, ANTEPARTUM: Primary | ICD-10-CM

## 2019-12-02 DIAGNOSIS — Z36.4 ANTENATAL SCREENING FOR FETAL GROWTH RETARDATION USING ULTRASONICS: ICD-10-CM

## 2019-12-02 DIAGNOSIS — O26.13 LOW WEIGHT GAIN DURING PREGNANCY IN THIRD TRIMESTER: ICD-10-CM

## 2019-12-02 DIAGNOSIS — O99.330 TOBACCO USE DISORDER COMPLICATING PREGNANCY, CHILDBIRTH, OR PUERPERIUM, ANTEPARTUM: ICD-10-CM

## 2019-12-02 PROCEDURE — 99242 OFF/OP CONSLTJ NEW/EST SF 20: CPT | Performed by: OBSTETRICS & GYNECOLOGY

## 2019-12-02 PROCEDURE — 76819 FETAL BIOPHYS PROFIL W/O NST: CPT | Performed by: OBSTETRICS & GYNECOLOGY

## 2019-12-02 PROCEDURE — 76805 OB US >/= 14 WKS SNGL FETUS: CPT | Performed by: OBSTETRICS & GYNECOLOGY

## 2019-12-02 RX ORDER — ONDANSETRON 4 MG/1
4 TABLET, FILM COATED ORAL EVERY 8 HOURS PRN
Qty: 30 TABLET | Refills: 0 | Status: SHIPPED | OUTPATIENT
Start: 2019-12-02 | End: 2019-12-05 | Stop reason: SDUPTHER

## 2019-12-04 ENCOUNTER — HOSPITAL ENCOUNTER (OUTPATIENT)
Age: 29
Discharge: HOME | End: 2019-12-04
Attending: OBSTETRICS & GYNECOLOGY | Admitting: OBSTETRICS & GYNECOLOGY
Payer: COMMERCIAL

## 2019-12-04 VITALS
SYSTOLIC BLOOD PRESSURE: 108 MMHG | HEART RATE: 95 BPM | DIASTOLIC BLOOD PRESSURE: 70 MMHG | RESPIRATION RATE: 14 BRPM | TEMPERATURE: 98.7 F

## 2019-12-04 PROBLEM — Z3A.31 31 WEEKS GESTATION OF PREGNANCY: Status: ACTIVE | Noted: 2019-12-04

## 2019-12-04 PROBLEM — R82.90 ABNORMAL URINALYSIS: Status: ACTIVE | Noted: 2019-12-04

## 2019-12-04 PROBLEM — Z98.891 HISTORY OF CESAREAN DELIVERY: Status: ACTIVE | Noted: 2019-12-04

## 2019-12-04 LAB
-: ABNORMAL
AMORPHOUS: ABNORMAL
BACTERIA: ABNORMAL
BILIRUBIN URINE: NEGATIVE
CASTS UA: ABNORMAL /LPF
COLOR: ABNORMAL
COMMENT UA: ABNORMAL
CRYSTALS, UA: ABNORMAL /HPF
EPITHELIAL CELLS UA: ABNORMAL /HPF
GLUCOSE URINE: NEGATIVE
KETONES, URINE: NEGATIVE
LEUKOCYTE ESTERASE, URINE: ABNORMAL
MUCUS: ABNORMAL
NITRITE, URINE: NEGATIVE
OTHER OBSERVATIONS UA: ABNORMAL
PH UA: 6 (ref 5–8)
PROTEIN UA: ABNORMAL
RBC UA: ABNORMAL /HPF
RENAL EPITHELIAL, UA: ABNORMAL /HPF
SPECIFIC GRAVITY UA: 1.02 (ref 1–1.03)
TRICHOMONAS: ABNORMAL
TURBIDITY: ABNORMAL
URINE HGB: ABNORMAL
UROBILINOGEN, URINE: NORMAL
WBC UA: ABNORMAL /HPF
YEAST: ABNORMAL

## 2019-12-04 PROCEDURE — 6370000000 HC RX 637 (ALT 250 FOR IP): Performed by: STUDENT IN AN ORGANIZED HEALTH CARE EDUCATION/TRAINING PROGRAM

## 2019-12-04 PROCEDURE — 87591 N.GONORRHOEAE DNA AMP PROB: CPT

## 2019-12-04 PROCEDURE — 81001 URINALYSIS AUTO W/SCOPE: CPT

## 2019-12-04 PROCEDURE — 87491 CHLMYD TRACH DNA AMP PROBE: CPT

## 2019-12-04 RX ORDER — ACETAMINOPHEN 500 MG
1000 TABLET ORAL EVERY 6 HOURS PRN
Status: DISCONTINUED | OUTPATIENT
Start: 2019-12-04 | End: 2019-12-04 | Stop reason: HOSPADM

## 2019-12-04 RX ORDER — CEPHALEXIN 500 MG/1
500 CAPSULE ORAL 4 TIMES DAILY
Qty: 28 CAPSULE | Refills: 0 | Status: SHIPPED | OUTPATIENT
Start: 2019-12-04 | End: 2019-12-11

## 2019-12-04 RX ORDER — METRONIDAZOLE 500 MG/1
500 TABLET ORAL 2 TIMES DAILY
Qty: 14 TABLET | Refills: 0 | Status: SHIPPED | OUTPATIENT
Start: 2019-12-04 | End: 2019-12-11

## 2019-12-04 RX ADMIN — ACETAMINOPHEN 1000 MG: 500 TABLET, FILM COATED ORAL at 19:38

## 2019-12-04 ASSESSMENT — PAIN SCALES - GENERAL: PAINLEVEL_OUTOF10: 7

## 2019-12-05 ENCOUNTER — TELEPHONE (OUTPATIENT)
Dept: OBGYN CLINIC | Age: 29
End: 2019-12-05

## 2019-12-05 RX ORDER — ONDANSETRON 4 MG/1
4 TABLET, FILM COATED ORAL EVERY 8 HOURS PRN
Qty: 30 TABLET | Refills: 0 | Status: SHIPPED | OUTPATIENT
Start: 2019-12-05 | End: 2020-01-02 | Stop reason: SDUPTHER

## 2019-12-09 ENCOUNTER — HOSPITAL ENCOUNTER (OUTPATIENT)
Dept: DIABETES SERVICES | Age: 29
Setting detail: THERAPIES SERIES
Discharge: HOME | End: 2019-12-09
Payer: COMMERCIAL

## 2019-12-09 DIAGNOSIS — O24.410 DIET CONTROLLED GESTATIONAL DIABETES MELLITUS (GDM), ANTEPARTUM: Primary | ICD-10-CM

## 2019-12-09 PROCEDURE — G0108 DIAB MANAGE TRN  PER INDIV: HCPCS

## 2019-12-12 ENCOUNTER — ROUTINE PRENATAL (OUTPATIENT)
Dept: OBGYN CLINIC | Age: 29
End: 2019-12-12

## 2019-12-12 VITALS
SYSTOLIC BLOOD PRESSURE: 105 MMHG | HEART RATE: 68 BPM | BODY MASS INDEX: 20.37 KG/M2 | WEIGHT: 115 LBS | DIASTOLIC BLOOD PRESSURE: 66 MMHG

## 2019-12-12 DIAGNOSIS — O09.93 HIGH-RISK PREGNANCY IN THIRD TRIMESTER: Primary | ICD-10-CM

## 2019-12-12 DIAGNOSIS — E03.8 TSH DEFICIENCY: ICD-10-CM

## 2019-12-12 DIAGNOSIS — Z3A.32 32 WEEKS GESTATION OF PREGNANCY: ICD-10-CM

## 2019-12-12 DIAGNOSIS — O24.419 GESTATIONAL DIABETES MELLITUS (GDM), ANTEPARTUM, GESTATIONAL DIABETES METHOD OF CONTROL UNSPECIFIED: ICD-10-CM

## 2019-12-12 PROCEDURE — 0502F SUBSEQUENT PRENATAL CARE: CPT | Performed by: OBSTETRICS & GYNECOLOGY

## 2019-12-12 RX ORDER — SYRINGE AND NEEDLE,INSULIN,1ML 28GX1/2"
SYRINGE, EMPTY DISPOSABLE MISCELLANEOUS
Status: ON HOLD | COMMUNITY
Start: 2019-12-02 | End: 2020-01-21 | Stop reason: HOSPADM

## 2019-12-12 RX ORDER — BLOOD SUGAR DIAGNOSTIC
STRIP MISCELLANEOUS
Status: ON HOLD | COMMUNITY
Start: 2019-12-07 | End: 2020-01-21 | Stop reason: HOSPADM

## 2019-12-12 RX ORDER — BLOOD-GLUCOSE METER
EACH MISCELLANEOUS
Status: ON HOLD | COMMUNITY
Start: 2019-11-14 | End: 2020-01-21 | Stop reason: HOSPADM

## 2019-12-12 RX ORDER — LANCETS 33 GAUGE
EACH MISCELLANEOUS
Status: ON HOLD | COMMUNITY
Start: 2019-11-14 | End: 2020-01-21 | Stop reason: HOSPADM

## 2019-12-17 ENCOUNTER — OFFICE VISIT (OUTPATIENT)
Dept: FAMILY MEDICINE CLINIC | Age: 29
End: 2019-12-17
Payer: COMMERCIAL

## 2019-12-17 ENCOUNTER — ROUTINE PRENATAL (OUTPATIENT)
Dept: OBGYN CLINIC | Age: 29
End: 2019-12-17
Payer: COMMERCIAL

## 2019-12-17 VITALS
BODY MASS INDEX: 20.73 KG/M2 | SYSTOLIC BLOOD PRESSURE: 97 MMHG | DIASTOLIC BLOOD PRESSURE: 63 MMHG | WEIGHT: 117 LBS | HEART RATE: 88 BPM

## 2019-12-17 VITALS
WEIGHT: 117 LBS | DIASTOLIC BLOOD PRESSURE: 70 MMHG | HEART RATE: 100 BPM | SYSTOLIC BLOOD PRESSURE: 110 MMHG | BODY MASS INDEX: 20.73 KG/M2 | TEMPERATURE: 98.3 F | OXYGEN SATURATION: 100 %

## 2019-12-17 DIAGNOSIS — M25.571 ACUTE RIGHT ANKLE PAIN: ICD-10-CM

## 2019-12-17 DIAGNOSIS — M79.671 RIGHT FOOT PAIN: ICD-10-CM

## 2019-12-17 DIAGNOSIS — O24.419 GESTATIONAL DIABETES MELLITUS (GDM), ANTEPARTUM, GESTATIONAL DIABETES METHOD OF CONTROL UNSPECIFIED: ICD-10-CM

## 2019-12-17 DIAGNOSIS — E03.8 TSH DEFICIENCY: ICD-10-CM

## 2019-12-17 DIAGNOSIS — S93.401A SPRAIN OF RIGHT ANKLE, UNSPECIFIED LIGAMENT, INITIAL ENCOUNTER: Primary | ICD-10-CM

## 2019-12-17 DIAGNOSIS — Z3A.33 33 WEEKS GESTATION OF PREGNANCY: Primary | ICD-10-CM

## 2019-12-17 PROCEDURE — 0502F SUBSEQUENT PRENATAL CARE: CPT | Performed by: NURSE PRACTITIONER

## 2019-12-17 PROCEDURE — 59025 FETAL NON-STRESS TEST: CPT | Performed by: NURSE PRACTITIONER

## 2019-12-17 PROCEDURE — 99202 OFFICE O/P NEW SF 15 MIN: CPT | Performed by: NURSE PRACTITIONER

## 2019-12-19 ENCOUNTER — HOSPITAL ENCOUNTER (OUTPATIENT)
Age: 29
Discharge: HOME | End: 2019-12-19
Attending: OBSTETRICS & GYNECOLOGY | Admitting: OBSTETRICS & GYNECOLOGY
Payer: COMMERCIAL

## 2019-12-19 VITALS
HEART RATE: 96 BPM | RESPIRATION RATE: 16 BRPM | DIASTOLIC BLOOD PRESSURE: 67 MMHG | SYSTOLIC BLOOD PRESSURE: 127 MMHG | TEMPERATURE: 97.9 F

## 2019-12-19 PROBLEM — Z3A.33 33 WEEKS GESTATION OF PREGNANCY: Status: ACTIVE | Noted: 2019-12-19

## 2019-12-19 PROCEDURE — 76818 FETAL BIOPHYS PROFILE W/NST: CPT

## 2019-12-19 PROCEDURE — 59025 FETAL NON-STRESS TEST: CPT

## 2019-12-23 ENCOUNTER — ROUTINE PRENATAL (OUTPATIENT)
Dept: PERINATAL CARE | Age: 29
End: 2019-12-23
Payer: COMMERCIAL

## 2019-12-23 VITALS
WEIGHT: 114 LBS | TEMPERATURE: 98.1 F | HEART RATE: 88 BPM | BODY MASS INDEX: 20.2 KG/M2 | RESPIRATION RATE: 16 BRPM | HEIGHT: 63 IN | SYSTOLIC BLOOD PRESSURE: 94 MMHG | DIASTOLIC BLOOD PRESSURE: 50 MMHG

## 2019-12-23 DIAGNOSIS — O40.9XX0 POLYHYDRAMNIOS, ANTEPARTUM, SINGLE OR UNSPECIFIED FETUS: ICD-10-CM

## 2019-12-23 DIAGNOSIS — O26.13 LOW WEIGHT GAIN DURING PREGNANCY IN THIRD TRIMESTER: ICD-10-CM

## 2019-12-23 DIAGNOSIS — O24.414 INSULIN CONTROLLED GESTATIONAL DIABETES MELLITUS (GDM) DURING PREGNANCY, ANTEPARTUM: Primary | ICD-10-CM

## 2019-12-23 DIAGNOSIS — Z3A.34 34 WEEKS GESTATION OF PREGNANCY: ICD-10-CM

## 2019-12-23 DIAGNOSIS — O36.5990 POOR FETAL GROWTH AFFECTING PREGNANCY, ANTEPARTUM, SINGLE OR UNSPECIFIED FETUS: ICD-10-CM

## 2019-12-23 DIAGNOSIS — Z36.4 ANTENATAL SCREENING FOR FETAL GROWTH RETARDATION USING ULTRASONICS: ICD-10-CM

## 2019-12-23 PROCEDURE — 76818 FETAL BIOPHYS PROFILE W/NST: CPT | Performed by: OBSTETRICS & GYNECOLOGY

## 2019-12-23 PROCEDURE — 76821 MIDDLE CEREBRAL ARTERY ECHO: CPT | Performed by: OBSTETRICS & GYNECOLOGY

## 2019-12-23 PROCEDURE — 76816 OB US FOLLOW-UP PER FETUS: CPT | Performed by: OBSTETRICS & GYNECOLOGY

## 2019-12-23 PROCEDURE — 76820 UMBILICAL ARTERY ECHO: CPT | Performed by: OBSTETRICS & GYNECOLOGY

## 2019-12-23 PROCEDURE — 99211 OFF/OP EST MAY X REQ PHY/QHP: CPT | Performed by: OBSTETRICS & GYNECOLOGY

## 2019-12-26 ENCOUNTER — ROUTINE PRENATAL (OUTPATIENT)
Dept: OBGYN CLINIC | Age: 29
End: 2019-12-26
Payer: COMMERCIAL

## 2019-12-26 VITALS
DIASTOLIC BLOOD PRESSURE: 66 MMHG | SYSTOLIC BLOOD PRESSURE: 98 MMHG | WEIGHT: 115 LBS | BODY MASS INDEX: 20.37 KG/M2 | HEART RATE: 83 BPM

## 2019-12-26 DIAGNOSIS — E03.8 TSH DEFICIENCY: ICD-10-CM

## 2019-12-26 DIAGNOSIS — Z3A.34 34 WEEKS GESTATION OF PREGNANCY: Primary | ICD-10-CM

## 2019-12-26 DIAGNOSIS — O24.419 GESTATIONAL DIABETES MELLITUS (GDM), ANTEPARTUM, GESTATIONAL DIABETES METHOD OF CONTROL UNSPECIFIED: ICD-10-CM

## 2019-12-26 PROBLEM — S61.230D: Status: ACTIVE | Noted: 2019-02-08
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
## 2019-12-26 PROCEDURE — 0502F SUBSEQUENT PRENATAL CARE: CPT | Performed by: NURSE PRACTITIONER

## 2019-12-26 PROCEDURE — 59025 FETAL NON-STRESS TEST: CPT | Performed by: NURSE PRACTITIONER

## 2019-12-30 ENCOUNTER — ROUTINE PRENATAL (OUTPATIENT)
Dept: OBGYN CLINIC | Age: 29
End: 2019-12-30
Payer: COMMERCIAL

## 2019-12-30 ENCOUNTER — ROUTINE PRENATAL (OUTPATIENT)
Dept: PERINATAL CARE | Age: 29
End: 2019-12-30
Payer: COMMERCIAL

## 2019-12-30 VITALS
BODY MASS INDEX: 20.73 KG/M2 | DIASTOLIC BLOOD PRESSURE: 66 MMHG | WEIGHT: 117 LBS | SYSTOLIC BLOOD PRESSURE: 100 MMHG | HEART RATE: 75 BPM

## 2019-12-30 VITALS
RESPIRATION RATE: 16 BRPM | HEIGHT: 63 IN | DIASTOLIC BLOOD PRESSURE: 65 MMHG | TEMPERATURE: 98.3 F | BODY MASS INDEX: 20.55 KG/M2 | WEIGHT: 116 LBS | HEART RATE: 87 BPM | SYSTOLIC BLOOD PRESSURE: 98 MMHG

## 2019-12-30 DIAGNOSIS — Z3A.35 35 WEEKS GESTATION OF PREGNANCY: Primary | ICD-10-CM

## 2019-12-30 DIAGNOSIS — O40.3XX0 POLYHYDRAMNIOS AFFECTING PREGNANCY IN THIRD TRIMESTER: ICD-10-CM

## 2019-12-30 DIAGNOSIS — O34.219 PREVIOUS CESAREAN DELIVERY AFFECTING PREGNANCY, ANTEPARTUM: ICD-10-CM

## 2019-12-30 DIAGNOSIS — E03.8 TSH DEFICIENCY: ICD-10-CM

## 2019-12-30 DIAGNOSIS — O24.419 GESTATIONAL DIABETES MELLITUS (GDM), ANTEPARTUM, GESTATIONAL DIABETES METHOD OF CONTROL UNSPECIFIED: ICD-10-CM

## 2019-12-30 DIAGNOSIS — O99.333 TOBACCO SMOKING AFFECTING PREGNANCY IN THIRD TRIMESTER: ICD-10-CM

## 2019-12-30 PROCEDURE — 0502F SUBSEQUENT PRENATAL CARE: CPT | Performed by: NURSE PRACTITIONER

## 2019-12-30 PROCEDURE — 76820 UMBILICAL ARTERY ECHO: CPT | Performed by: OBSTETRICS & GYNECOLOGY

## 2019-12-30 PROCEDURE — 76819 FETAL BIOPHYS PROFIL W/O NST: CPT | Performed by: OBSTETRICS & GYNECOLOGY

## 2019-12-30 PROCEDURE — 76821 MIDDLE CEREBRAL ARTERY ECHO: CPT | Performed by: OBSTETRICS & GYNECOLOGY

## 2019-12-30 PROCEDURE — 59025 FETAL NON-STRESS TEST: CPT | Performed by: NURSE PRACTITIONER

## 2019-12-30 PROCEDURE — 76815 OB US LIMITED FETUS(S): CPT | Performed by: OBSTETRICS & GYNECOLOGY

## 2020-01-02 ENCOUNTER — ROUTINE PRENATAL (OUTPATIENT)
Dept: OBGYN CLINIC | Age: 30
End: 2020-01-02

## 2020-01-02 ENCOUNTER — ROUTINE PRENATAL (OUTPATIENT)
Dept: OBGYN CLINIC | Age: 30
End: 2020-01-02
Payer: COMMERCIAL

## 2020-01-02 VITALS
DIASTOLIC BLOOD PRESSURE: 63 MMHG | HEART RATE: 78 BPM | WEIGHT: 118 LBS | SYSTOLIC BLOOD PRESSURE: 100 MMHG | BODY MASS INDEX: 20.9 KG/M2

## 2020-01-02 VITALS — BODY MASS INDEX: 20.9 KG/M2 | HEART RATE: 78 BPM | SYSTOLIC BLOOD PRESSURE: 163 MMHG | WEIGHT: 118 LBS

## 2020-01-02 PROCEDURE — 0502F SUBSEQUENT PRENATAL CARE: CPT | Performed by: ADVANCED PRACTICE MIDWIFE

## 2020-01-02 PROCEDURE — 59025 FETAL NON-STRESS TEST: CPT | Performed by: ADVANCED PRACTICE MIDWIFE

## 2020-01-02 RX ORDER — ONDANSETRON 4 MG/1
4 TABLET, FILM COATED ORAL EVERY 8 HOURS PRN
Qty: 30 TABLET | Refills: 0 | Status: SHIPPED | OUTPATIENT
Start: 2020-01-02 | End: 2020-01-31

## 2020-01-02 NOTE — PROGRESS NOTES
31 weeks gestation of pregnancy 2019    Hx CS x1 2019    Abnormal urinalysis 2019: UA showed large Hgb in urine, microscopic UA showed  RBC. Rx Keflex given to patient, will need repeat UA. UCx pending       Gestational diabetes mellitus (GDM), antepartum 2019 3 hour GTT with 2 abnormals  Consult to M  2019 NPH 8 units before bedtime  32 week  testing  Interval fetal growth scans every 4 weeks        TSH deficiency 07/10/2019     7/10/2019 Repeat level later in pregnancy      Contact with hypodermic needle 2019    Pnctr w/o FB of r idx fngr w/o damage to nail, subs 2019    IBS (irritable bowel syndrome)         Diagnosis Orders   1. HRP (high risk pregnancy), third trimester  MI FETAL NON-STRESS TEST   2. Gestational diabetes mellitus (GDM), antepartum, gestational diabetes method of control unspecified  MI FETAL NON-STRESS TEST   3. TSH deficiency  MI FETAL NON-STRESS TEST    TSH with Reflex    T4, Free   4. 35 weeks gestation of pregnancy  MI FETAL NON-STRESS TEST    TSH with Reflex    T4, Free             Plan:  The patient will return to the office for her next visit in 1 weeks. See antepartum flow sheet.  Testing Indicated: Yes  Scheduled with Nursing-Pt notified:  Yes

## 2020-01-02 NOTE — PROGRESS NOTES
WBC, UA 2019 0 TO 2  /HPF Final    RBC, UA 2019 50   /HPF Final    Casts UA 2019 NOT REPORTED  /LPF Final    Crystals UA 2019 NOT REPORTED  None /HPF Final    Epithelial Cells UA 2019 10 TO 20  /HPF Final    Renal Epithelial, Urine 2019 NOT REPORTED  0 /HPF Final    Bacteria, UA 2019 MANY* None Final    Mucus, UA 2019 NOT REPORTED  None Final    Trichomonas, UA 2019 NOT REPORTED  None Final    Amorphous, UA 2019 NOT REPORTED  None Final    Other Observations UA 2019 NOT REPORTED  NOT REQ. Final    Yeast, UA 2019 NOT REPORTED  None Final   ]  Sensitivities for clindamycin and erythromycin were ordered. No      The patient was counseled on the mandatory call ahead policy. She has been instructed to call the office at anytime prior to going into the hospital so the on-call physician may direct her to the appropriate facility for care. Exceptions were reviewed including but not limited to: Decreased fetal movement, vaginal Bleeding or hemorrhage, trauma, readily expectant delivery, or any instance where she feels 911 should be utilized. The patient was counseled on Labor & Delivery. yes  Route of delivery and counseling on vaginal, operative vaginal, and  sections were completed with the risks of each to both the patient as well as her baby. The possibility of a blood transfusion was discussed as well. The patient was not opposed to receiving a transfusion if needed. The patient was counseled on types of analgesia during labor and is considering either Regional or IV medication the risks, benefits and alternatives were discussed.  Testing:  yes      Assessment:  1Valentina Wiseman is a 34 y.o. female  2.  [id number]  3. 35w3d      Patient Active Problem List    Diagnosis Date Noted    33 weeks gestation of pregnancy 2019    31 weeks gestation of pregnancy 2019    Hx CS x1 2019    Abnormal urinalysis 2019     Overview Note:     19: UA showed large Hgb in urine, microscopic UA showed  RBC. Rx Keflex given to patient, will need repeat UA. UCx pending       Gestational diabetes mellitus (GDM), antepartum 2019     Overview Note:     2019 3 hour GTT with 2 abnormals  Consult to MFM  2019 NPH 8 units before bedtime  32 week  testing  Interval fetal growth scans every 4 weeks        TSH deficiency 07/10/2019     Overview Note:     7/10/2019 Repeat level later in pregnancy      Contact with hypodermic needle 2019    Pnctr w/o FB of r idx fngr w/o damage to nail, subs 2019    IBS (irritable bowel syndrome)         Diagnosis Orders   1. HRP (high risk pregnancy), third trimester  WA FETAL NON-STRESS TEST   2. 35 weeks gestation of pregnancy  WA FETAL NON-STRESS TEST   3. Insulin controlled gestational diabetes mellitus (GDM) during pregnancy, antepartum  [place] FETAL NON-STRESS TEST   4. TSH deficiency               Plan:  The patient will return to the office for her next visit in 1 weeks. See antepartum flow sheet.    Asking for refill on Zofran  Repeat TSH and T4 ordered

## 2020-01-06 ENCOUNTER — ROUTINE PRENATAL (OUTPATIENT)
Dept: PERINATAL CARE | Age: 30
End: 2020-01-06
Payer: COMMERCIAL

## 2020-01-06 ENCOUNTER — HOSPITAL ENCOUNTER (OUTPATIENT)
Age: 30
Discharge: HOME | End: 2020-01-06
Payer: COMMERCIAL

## 2020-01-06 VITALS
HEIGHT: 63 IN | WEIGHT: 116 LBS | RESPIRATION RATE: 16 BRPM | HEART RATE: 74 BPM | BODY MASS INDEX: 20.55 KG/M2 | DIASTOLIC BLOOD PRESSURE: 68 MMHG | TEMPERATURE: 98.2 F | SYSTOLIC BLOOD PRESSURE: 106 MMHG

## 2020-01-06 LAB
THYROXINE, FREE: 1.13 NG/DL (ref 0.93–1.7)
TSH SERPL DL<=0.05 MIU/L-ACNC: 0.64 MIU/L (ref 0.3–5)

## 2020-01-06 PROCEDURE — 76818 FETAL BIOPHYS PROFILE W/NST: CPT | Performed by: OBSTETRICS & GYNECOLOGY

## 2020-01-06 PROCEDURE — 99211 OFF/OP EST MAY X REQ PHY/QHP: CPT | Performed by: OBSTETRICS & GYNECOLOGY

## 2020-01-06 PROCEDURE — 76815 OB US LIMITED FETUS(S): CPT | Performed by: OBSTETRICS & GYNECOLOGY

## 2020-01-06 PROCEDURE — 84443 ASSAY THYROID STIM HORMONE: CPT

## 2020-01-06 PROCEDURE — 36415 COLL VENOUS BLD VENIPUNCTURE: CPT

## 2020-01-06 PROCEDURE — 76821 MIDDLE CEREBRAL ARTERY ECHO: CPT | Performed by: OBSTETRICS & GYNECOLOGY

## 2020-01-06 PROCEDURE — 84439 ASSAY OF FREE THYROXINE: CPT

## 2020-01-06 PROCEDURE — 76820 UMBILICAL ARTERY ECHO: CPT | Performed by: OBSTETRICS & GYNECOLOGY

## 2020-01-09 ENCOUNTER — HOSPITAL ENCOUNTER (OUTPATIENT)
Age: 30
Setting detail: SPECIMEN
Discharge: HOME | End: 2020-01-09
Payer: COMMERCIAL

## 2020-01-09 ENCOUNTER — ROUTINE PRENATAL (OUTPATIENT)
Dept: OBGYN CLINIC | Age: 30
End: 2020-01-09
Payer: COMMERCIAL

## 2020-01-09 VITALS
HEART RATE: 84 BPM | SYSTOLIC BLOOD PRESSURE: 97 MMHG | WEIGHT: 117 LBS | DIASTOLIC BLOOD PRESSURE: 64 MMHG | BODY MASS INDEX: 20.73 KG/M2

## 2020-01-09 PROCEDURE — 0502F SUBSEQUENT PRENATAL CARE: CPT | Performed by: OBSTETRICS & GYNECOLOGY

## 2020-01-09 PROCEDURE — 59025 FETAL NON-STRESS TEST: CPT | Performed by: OBSTETRICS & GYNECOLOGY

## 2020-01-09 PROCEDURE — 87081 CULTURE SCREEN ONLY: CPT

## 2020-01-09 NOTE — PROGRESS NOTES
Janusz Silva is a 34 y.o. female 36w3d        OB History    Para Term  AB Living   3 1 1 0 1 1   SAB TAB Ectopic Molar Multiple Live Births   1 0 0   0 1      # Outcome Date GA Lbr Melvin/2nd Weight Sex Delivery Anes PTL Lv   3 Current            2 SAB            1 Term 09 38w0d  5 lb 12 oz (2.608 kg) M CS-Unspec   MANDY         Vitals  BP: 97/64  Weight: 117 lb (53.1 kg)  Pulse: 84  Patient Position: Sitting  Albumin: Trace  Glucose: Negative      The patient was seen and evaluated. There was positive fetal movements. No contractions or leakage of fluid. Signs and symptoms of labor were reviewed. The S/S of Pre-Eclampsia were reviewed with the patient in detail. She is to report any of these if they occur. She currently denies any of these. The patient was instructed on fetal kick counts and was given a kick sheet to complete every 8 hours. She was instructed that the baby should move at a minimum of ten times within one hour after a meal. The patient was instructed to lay down on her left side twenty minutes after eating and count movements for up to one hour with a target value of ten movements. She was instructed to notify the office if she did not make that target after two attempts or if after any attempt there was less than four movements. The patient reports that the targets have been made Yes. 19 Tdap given     10/10/19 patient accepted and given influenza vaccine         T-Dap Vaccine (27-36 weeks) Completed: Completed     Allergies: Allergies as of 2020 - Review Complete 2020   Allergen Reaction Noted    Arestin [minocycline] Hives 2019       Group Beta Strep collection was completed.  Yes   GBS Results:   Hospital Outpatient Visit on 2020   Component Date Value Ref Range Status    Thyroxine, Free 2020 1.13  0.93 - 1.70 ng/dL Final    TSH 2020 0.64  0.30 - 5.00 mIU/L Final   ]  Sensitivities for clindamycin and erythromycin were ordered. No      The patient was counseled on the mandatory call ahead policy. She has been instructed to call the office at anytime prior to going into the hospital so the on-call physician may direct her to the appropriate facility for care. Exceptions were reviewed including but not limited to: Decreased fetal movement, vaginal Bleeding or hemorrhage, trauma, readily expectant delivery, or any instance where she feels 911 should be utilized. The patient was counseled on Labor & Delivery. Route of delivery and counseling on vaginal, operative vaginal, and  sections were completed with the risks of each to both the patient as well as her baby. The possibility of a blood transfusion was discussed as well. The patient was not opposed to receiving a transfusion if needed. The patient was counseled on types of analgesia during labor and is considering either Regional or IV medication the risks, benefits and alternatives were discussed.  Testing:  REACTIVE NST  CATEGORY 1 TRACING  Moderate Variability  Baseline of 120 bpm  SEE SCANNED DOCUMENT  RTO 2-5 DAYS FOR A FOLLOW UP APPOINTMENT WITH  TESTING. Assessment:  Alexandra Flores is a 34 y.o. female  2. [id number]  3. 36w3d      Patient Active Problem List    Diagnosis Date Noted    Gestational diabetes mellitus (GDM), antepartum 2019     Priority: High     Overview Note:     2019 3 hour GTT with 2 abnormals  Consult to M  2019 NPH 8 units before bedtime  32 week  testing  Interval fetal growth scans every 4 weeks        33 weeks gestation of pregnancy 2019    31 weeks gestation of pregnancy 2019    Hx CS x1 2019    Abnormal urinalysis 2019     Overview Note:     19: UA showed large Hgb in urine, microscopic UA showed  RBC. Rx Keflex given to patient, will need repeat UA.  UCx pending       TSH deficiency 07/10/2019     Overview Note:

## 2020-01-12 LAB
CULTURE: NORMAL
Lab: NORMAL
SPECIMEN DESCRIPTION: NORMAL

## 2020-01-14 ENCOUNTER — ROUTINE PRENATAL (OUTPATIENT)
Dept: PERINATAL CARE | Age: 30
End: 2020-01-14
Payer: COMMERCIAL

## 2020-01-14 VITALS
DIASTOLIC BLOOD PRESSURE: 70 MMHG | BODY MASS INDEX: 20.38 KG/M2 | HEIGHT: 63 IN | SYSTOLIC BLOOD PRESSURE: 112 MMHG | HEART RATE: 82 BPM | WEIGHT: 115 LBS | TEMPERATURE: 98 F | RESPIRATION RATE: 16 BRPM

## 2020-01-14 PROBLEM — Z3A.31 31 WEEKS GESTATION OF PREGNANCY: Status: RESOLVED | Noted: 2019-12-04 | Resolved: 2020-01-14

## 2020-01-14 PROBLEM — Z3A.33 33 WEEKS GESTATION OF PREGNANCY: Status: RESOLVED | Noted: 2019-12-19 | Resolved: 2020-01-14

## 2020-01-14 PROCEDURE — 76816 OB US FOLLOW-UP PER FETUS: CPT | Performed by: OBSTETRICS & GYNECOLOGY

## 2020-01-14 PROCEDURE — 76820 UMBILICAL ARTERY ECHO: CPT | Performed by: OBSTETRICS & GYNECOLOGY

## 2020-01-14 PROCEDURE — 76821 MIDDLE CEREBRAL ARTERY ECHO: CPT | Performed by: OBSTETRICS & GYNECOLOGY

## 2020-01-14 PROCEDURE — 99211 OFF/OP EST MAY X REQ PHY/QHP: CPT | Performed by: OBSTETRICS & GYNECOLOGY

## 2020-01-14 PROCEDURE — 76818 FETAL BIOPHYS PROFILE W/NST: CPT | Performed by: OBSTETRICS & GYNECOLOGY

## 2020-01-14 NOTE — PROGRESS NOTES
General Surgery Daily Progress Note    Patient: Daniel Najera MRN: [id number]  SSN: [id number]    YOB: 1943  Age: 76 y.o. Sex: male      Admit Date: 8/5/2017    Subjective:   Diarrhea continues but is less frequent. Tolerating full liquids. Reports mild lower abdominal pain. Current Facility-Administered Medications   Medication Dose Route Frequency    morphine injection 2 mg  2 mg IntraVENous Q4H PRN    metroNIDAZOLE (FLAGYL) tablet 500 mg  500 mg Oral TID    sodium chloride (NS) flush 5-10 mL  5-10 mL IntraVENous Q8H    sodium chloride (NS) flush 5-10 mL  5-10 mL IntraVENous PRN    dextrose 5% - 0.45% NaCl with KCl 20 mEq/L infusion  100 mL/hr IntraVENous CONTINUOUS    naloxone (NARCAN) injection 0.4 mg  0.4 mg IntraVENous PRN    ondansetron (ZOFRAN) injection 4 mg  4 mg IntraVENous Q4H PRN        Objective:      08/06 1901 - 08/08 0700  In: 2905 [I.V.:2905]  Out: 1   Patient Vitals for the past 8 hrs:   BP Temp Pulse Resp SpO2   08/08/17 0855 127/66 97.6 °F (36.4 °C) 80 17 96 %   08/08/17 0813 138/66 - (!) 52 16 96 %       Physical Exam:  General: Alert, cooperative, NAD  Lungs: Unlabored  Heart:  Regular rate and rhythm  Abdomen: Soft, non-distended, incisions healing   Extremities: Warm, moves all, no edema  Skin:  Warm and dry, no rash    Labs: Recent Labs      08/05/17   1850   WBC  10.2   HGB  12.9   HCT  38.0   PLT  257     Recent Labs      08/05/17   1850   NA  138   K  4.1   CL  104   CO2  24   GLU  92   BUN  17   CREA  1.05   CA  8.7   ALB  2.7*   TBILI  0.4   SGOT  29   ALT  45       Assessment / Plan:   · C diff colitis, recent lap sigmoidectomy  · Transition to PO flagyl  · Advance diet  · D/c IVF  · Possible discharge today vs tomorrow if tolerating PO food and meds. MFM Office Visit:  Blood sugars reviewed (adequate glycemic control). Insulin regimen continue: NPH Insulin subcutaneously 8 units before bedtime. I would advise delivery between 45 0/7-39 6/7 weeks' gestation for fetal growth restriction (if remains otherwise uncomplicated) (per [employer] and the Society for Maternal-Fetal Medicine guidelines in the ACOG Committee Opinion Number [id number] (\"Medically Indicated Late- and Early-Term Deliveries\", Obstetrics & Gynecology, Volume 133, NO.2, 2019), unless maternal and/or fetal factors dictate delivery prior to this point, such as abnormalities in doppler waveforms, non-reassuring fetal heart tones/status, deterioration in biophysical profile testing, oligohydramnios with an RAYRAY < 5.0 cm, development of other further fetal/maternal medical and obstetric complications of pregnancy, etc. (e.g. preeclampsia, chronic hypertension, etc.).

## 2020-01-15 ENCOUNTER — TELEPHONE (OUTPATIENT)
Dept: OBGYN CLINIC | Age: 30
End: 2020-01-15

## 2020-01-15 NOTE — TELEPHONE ENCOUNTER
Patient is going to see Dr. Gaviota Gallegos tomorrow to discuss delivery plan. Patient made aware via telephone call that she will see physician tomorrow.

## 2020-01-16 ENCOUNTER — ROUTINE PRENATAL (OUTPATIENT)
Dept: OBGYN CLINIC | Age: 30
End: 2020-01-16
Payer: COMMERCIAL

## 2020-01-16 VITALS
SYSTOLIC BLOOD PRESSURE: 95 MMHG | HEART RATE: 81 BPM | DIASTOLIC BLOOD PRESSURE: 61 MMHG | WEIGHT: 117 LBS | BODY MASS INDEX: 20.73 KG/M2

## 2020-01-16 PROCEDURE — 0502F SUBSEQUENT PRENATAL CARE: CPT | Performed by: OBSTETRICS & GYNECOLOGY

## 2020-01-16 PROCEDURE — 59025 FETAL NON-STRESS TEST: CPT | Performed by: OBSTETRICS & GYNECOLOGY

## 2020-01-19 NOTE — DISCHARGE SUMMARY
Obstetric Discharge Summary  [facility]    Patient Name: Dimitrios Dempsey  Patient :   Primary Care Physician: GRACIELA Pereira - CNP  Admit Date: 2020    Principal Diagnosis: IUP at 38w0d, admitted for M rec delivery 2/2 IUGR       Her pregnancy has been complicated by:   Patient Active Problem List   Diagnosis    IBS (irritable bowel syndrome)    TSH deficiency    GDMA2    Hx CS x1    Abnormal urinalysis    Contact with hypodermic needle    Pnctr w/o FB of r idx fngr w/o damage to nail, subs    Poor fetal growth affecting management of mother in third trimester    Delivery with history of     RLTCS 20 M APG 9/ Wt 4#6    Postpartum state       Infection Present?: No  Hospital Acquired: No    Surgical Operations & Procedures:  [] Pitocin Induction of Labor  [] Pitocin Augmentation of Labor  [] Prostaglandin Induction of Labor  [] Mechanical Induction of Labor  [] Artificial Rupture of Membranes  [] Intrauterine Pressure Catheter  [] Fetal Scalp Electrode  [] Amnioinfusion  Analgesia: spinal  Delivery Type:  Delivery: See Labor and Delivery Summary   Laceration(s): Absent    Consultations: NICU and Anesthesia    Pertinent Findings & Procedures:   Dimitrios Dempsey is a 34 y.o. female  at 38w0d admitted for [facility] rec delivery for IUGR. Patient has a hx of  section and is declining TOLAC. She received Ancef and Bicitra preoperatively. She delivered by  without labor a Live Born infant on 20. Information for the patient's :  Gale, Baby Demond Morris [[id number]]   male  Birth Weight: 4 lb 6.1 oz (1.986 kg)      Apgars: 9 at 1 minute and 9 at 5 minutes. Postpartum course: normal.    Ancef was continued for 24 hours post partum.   Hgb on POD#1 was 10.2  FBS on POD#1 was 62    Course of patient: uncomplicated    Discharge to: Home    Readmission planned: no     Recommendations on Discharge:

## 2020-01-20 ENCOUNTER — HOSPITAL ENCOUNTER (INPATIENT)
Age: 30
LOS: 2 days | Discharge: HOME | End: 2020-01-22
Attending: OBSTETRICS & GYNECOLOGY | Admitting: OBSTETRICS & GYNECOLOGY
Payer: COMMERCIAL

## 2020-01-20 ENCOUNTER — ANESTHESIA (OUTPATIENT)
Dept: LABOR AND DELIVERY | Age: 30
End: 2020-01-20
Payer: COMMERCIAL

## 2020-01-20 ENCOUNTER — ANESTHESIA EVENT (OUTPATIENT)
Dept: LABOR AND DELIVERY | Age: 30
End: 2020-01-20
Payer: COMMERCIAL

## 2020-01-20 VITALS — TEMPERATURE: 96.8 F | DIASTOLIC BLOOD PRESSURE: 55 MMHG | SYSTOLIC BLOOD PRESSURE: 99 MMHG | OXYGEN SATURATION: 99 %

## 2020-01-20 PROBLEM — Z3A.38 38 WEEKS GESTATION OF PREGNANCY: Status: RESOLVED | Noted: 2020-01-20 | Resolved: 2020-01-20

## 2020-01-20 PROBLEM — Z3A.38 38 WEEKS GESTATION OF PREGNANCY: Status: ACTIVE | Noted: 2020-01-20

## 2020-01-20 LAB
-: ABNORMAL
ABO/RH: NORMAL
AMORPHOUS: ABNORMAL
AMPHETAMINE SCREEN URINE: NEGATIVE
ANTIBODY SCREEN: NEGATIVE
ARM BAND NUMBER: NORMAL
BACTERIA: ABNORMAL
BARBITURATE SCREEN URINE: NEGATIVE
BENZODIAZEPINE SCREEN, URINE: NEGATIVE
BILIRUBIN URINE: ABNORMAL
BUPRENORPHINE URINE: ABNORMAL
CANNABINOID SCREEN URINE: POSITIVE
CASTS UA: ABNORMAL /LPF
COCAINE METABOLITE, URINE: NEGATIVE
COLOR: ABNORMAL
COMMENT UA: ABNORMAL
CRYSTALS, UA: ABNORMAL /HPF
EPITHELIAL CELLS UA: ABNORMAL /HPF
EXPIRATION DATE: NORMAL
GLUCOSE BLD-MCNC: 76 MG/DL (ref 65–105)
GLUCOSE URINE: NEGATIVE
HCT VFR BLD CALC: 38 % (ref 36–46)
HEMOGLOBIN: 12.9 G/DL (ref 12–16)
KETONES, URINE: ABNORMAL
LEUKOCYTE ESTERASE, URINE: ABNORMAL
MCH RBC QN AUTO: 31.3 PG (ref 26–34)
MCHC RBC AUTO-ENTMCNC: 34 G/DL (ref 31–37)
MCV RBC AUTO: 91.9 FL (ref 80–100)
MDMA URINE: ABNORMAL
METHADONE SCREEN, URINE: NEGATIVE
METHAMPHETAMINE, URINE: ABNORMAL
MUCUS: ABNORMAL
NITRITE, URINE: NEGATIVE
NRBC AUTOMATED: ABNORMAL PER 100 WBC
OPIATES, URINE: NEGATIVE
OTHER OBSERVATIONS UA: ABNORMAL
OXYCODONE SCREEN URINE: NEGATIVE
PDW BLD-RTO: 14.2 % (ref 11.5–14.9)
PH UA: 6.5 (ref 5–8)
PHENCYCLIDINE, URINE: NEGATIVE
PLATELET # BLD: 251 K/UL (ref 150–450)
PMV BLD AUTO: 8.6 FL (ref 6–12)
PROPOXYPHENE, URINE: ABNORMAL
PROTEIN UA: ABNORMAL
RBC # BLD: 4.14 M/UL (ref 4–5.2)
RBC UA: ABNORMAL /HPF
RENAL EPITHELIAL, UA: ABNORMAL /HPF
SPECIFIC GRAVITY UA: 1.02 (ref 1–1.03)
T. PALLIDUM, IGG: NONREACTIVE
TEST INFORMATION: ABNORMAL
TRICHOMONAS: ABNORMAL
TRICYCLIC ANTIDEPRESSANTS, UR: ABNORMAL
TURBIDITY: ABNORMAL
URINE HGB: NEGATIVE
UROBILINOGEN, URINE: NORMAL
WBC # BLD: 16.1 K/UL (ref 3.5–11)
WBC UA: ABNORMAL /HPF
YEAST: ABNORMAL

## 2020-01-20 PROCEDURE — 6360000002 HC RX W HCPCS: Performed by: STUDENT IN AN ORGANIZED HEALTH CARE EDUCATION/TRAINING PROGRAM

## 2020-01-20 PROCEDURE — 86780 TREPONEMA PALLIDUM: CPT

## 2020-01-20 PROCEDURE — 6370000000 HC RX 637 (ALT 250 FOR IP): Performed by: STUDENT IN AN ORGANIZED HEALTH CARE EDUCATION/TRAINING PROGRAM

## 2020-01-20 PROCEDURE — 82947 ASSAY GLUCOSE BLOOD QUANT: CPT

## 2020-01-20 PROCEDURE — 36415 COLL VENOUS BLD VENIPUNCTURE: CPT

## 2020-01-20 PROCEDURE — 86850 RBC ANTIBODY SCREEN: CPT

## 2020-01-20 PROCEDURE — 87086 URINE CULTURE/COLONY COUNT: CPT

## 2020-01-20 PROCEDURE — 2500000003 HC RX 250 WO HCPCS: Performed by: NURSE ANESTHETIST, CERTIFIED REGISTERED

## 2020-01-20 PROCEDURE — 85027 COMPLETE CBC AUTOMATED: CPT

## 2020-01-20 PROCEDURE — 82805 BLOOD GASES W/O2 SATURATION: CPT

## 2020-01-20 PROCEDURE — 3700000000 HC ANESTHESIA ATTENDED CARE: Performed by: OBSTETRICS & GYNECOLOGY

## 2020-01-20 PROCEDURE — 3609079900 HC CESAREAN SECTION: Performed by: OBSTETRICS & GYNECOLOGY

## 2020-01-20 PROCEDURE — 6360000002 HC RX W HCPCS: Performed by: NURSE ANESTHETIST, CERTIFIED REGISTERED

## 2020-01-20 PROCEDURE — 80307 DRUG TEST PRSMV CHEM ANLYZR: CPT

## 2020-01-20 PROCEDURE — 3700000001 HC ADD 15 MINUTES (ANESTHESIA): Performed by: OBSTETRICS & GYNECOLOGY

## 2020-01-20 PROCEDURE — 2580000003 HC RX 258: Performed by: STUDENT IN AN ORGANIZED HEALTH CARE EDUCATION/TRAINING PROGRAM

## 2020-01-20 PROCEDURE — 86900 BLOOD TYPING SEROLOGIC ABO: CPT

## 2020-01-20 PROCEDURE — 59510 CESAREAN DELIVERY: CPT | Performed by: OBSTETRICS & GYNECOLOGY

## 2020-01-20 PROCEDURE — 7100000000 HC PACU RECOVERY - FIRST 15 MIN: Performed by: OBSTETRICS & GYNECOLOGY

## 2020-01-20 PROCEDURE — 7100000001 HC PACU RECOVERY - ADDTL 15 MIN: Performed by: OBSTETRICS & GYNECOLOGY

## 2020-01-20 PROCEDURE — 81001 URINALYSIS AUTO W/SCOPE: CPT

## 2020-01-20 PROCEDURE — 86901 BLOOD TYPING SEROLOGIC RH(D): CPT

## 2020-01-20 PROCEDURE — 88307 TISSUE EXAM BY PATHOLOGIST: CPT

## 2020-01-20 PROCEDURE — 2580000003 HC RX 258: Performed by: NURSE ANESTHETIST, CERTIFIED REGISTERED

## 2020-01-20 PROCEDURE — 1220000000 HC SEMI PRIVATE OB R&B

## 2020-01-20 PROCEDURE — 76815 OB US LIMITED FETUS(S): CPT

## 2020-01-20 PROCEDURE — 59514 CESAREAN DELIVERY ONLY: CPT | Performed by: OBSTETRICS & GYNECOLOGY

## 2020-01-20 RX ORDER — PHENYLEPHRINE HYDROCHLORIDE 10 MG/ML
INJECTION INTRAVENOUS PRN
Status: DISCONTINUED | OUTPATIENT
Start: 2020-01-20 | End: 2020-01-20 | Stop reason: SDUPTHER

## 2020-01-20 RX ORDER — SODIUM CHLORIDE, SODIUM LACTATE, POTASSIUM CHLORIDE, CALCIUM CHLORIDE 600; 310; 30; 20 MG/100ML; MG/100ML; MG/100ML; MG/100ML
INJECTION, SOLUTION INTRAVENOUS CONTINUOUS
Status: DISCONTINUED | OUTPATIENT
Start: 2020-01-20 | End: 2020-01-22 | Stop reason: HOSPADM

## 2020-01-20 RX ORDER — SODIUM CHLORIDE 9 MG/ML
INJECTION, SOLUTION INTRAVENOUS CONTINUOUS
Status: DISCONTINUED | OUTPATIENT
Start: 2020-01-20 | End: 2020-01-20

## 2020-01-20 RX ORDER — SIMETHICONE 80 MG
80 TABLET,CHEWABLE ORAL EVERY 6 HOURS PRN
Status: DISCONTINUED | OUTPATIENT
Start: 2020-01-20 | End: 2020-01-22 | Stop reason: HOSPADM

## 2020-01-20 RX ORDER — ONDANSETRON 2 MG/ML
4 INJECTION INTRAMUSCULAR; INTRAVENOUS EVERY 6 HOURS PRN
Status: DISCONTINUED | OUTPATIENT
Start: 2020-01-20 | End: 2020-01-21

## 2020-01-20 RX ORDER — NICOTINE POLACRILEX 4 MG
15 LOZENGE BUCCAL PRN
Status: DISCONTINUED | OUTPATIENT
Start: 2020-01-20 | End: 2020-01-20

## 2020-01-20 RX ORDER — KETOROLAC TROMETHAMINE 30 MG/ML
INJECTION, SOLUTION INTRAMUSCULAR; INTRAVENOUS PRN
Status: DISCONTINUED | OUTPATIENT
Start: 2020-01-20 | End: 2020-01-20 | Stop reason: SDUPTHER

## 2020-01-20 RX ORDER — TRISODIUM CITRATE DIHYDRATE AND CITRIC ACID MONOHYDRATE 500; 334 MG/5ML; MG/5ML
30 SOLUTION ORAL ONCE
Status: COMPLETED | OUTPATIENT
Start: 2020-01-20 | End: 2020-01-20

## 2020-01-20 RX ORDER — SODIUM CHLORIDE 0.9 % (FLUSH) 0.9 %
10 SYRINGE (ML) INJECTION EVERY 12 HOURS SCHEDULED
Status: DISCONTINUED | OUTPATIENT
Start: 2020-01-20 | End: 2020-01-20

## 2020-01-20 RX ORDER — NALBUPHINE HCL 10 MG/ML
5 AMPUL (ML) INJECTION
Status: CANCELLED | OUTPATIENT
Start: 2020-01-20

## 2020-01-20 RX ORDER — SODIUM CHLORIDE 0.9 % (FLUSH) 0.9 %
10 SYRINGE (ML) INJECTION PRN
Status: DISCONTINUED | OUTPATIENT
Start: 2020-01-20 | End: 2020-01-20

## 2020-01-20 RX ORDER — ONDANSETRON 2 MG/ML
4 INJECTION INTRAMUSCULAR; INTRAVENOUS EVERY 6 HOURS PRN
Status: DISCONTINUED | OUTPATIENT
Start: 2020-01-20 | End: 2020-01-20

## 2020-01-20 RX ORDER — DIPHENHYDRAMINE HYDROCHLORIDE 50 MG/ML
25 INJECTION INTRAMUSCULAR; INTRAVENOUS EVERY 6 HOURS PRN
Status: DISCONTINUED | OUTPATIENT
Start: 2020-01-20 | End: 2020-01-22 | Stop reason: HOSPADM

## 2020-01-20 RX ORDER — ACETAMINOPHEN 325 MG/1
325 TABLET ORAL EVERY 4 HOURS PRN
Status: DISCONTINUED | OUTPATIENT
Start: 2020-01-20 | End: 2020-01-20

## 2020-01-20 RX ORDER — OXYCODONE HYDROCHLORIDE AND ACETAMINOPHEN 5; 325 MG/1; MG/1
2 TABLET ORAL EVERY 4 HOURS PRN
Status: DISCONTINUED | OUTPATIENT
Start: 2020-01-20 | End: 2020-01-21

## 2020-01-20 RX ORDER — ONDANSETRON 2 MG/ML
4 INJECTION INTRAMUSCULAR; INTRAVENOUS EVERY 6 HOURS PRN
Status: CANCELLED | OUTPATIENT
Start: 2020-01-20

## 2020-01-20 RX ORDER — BUPIVACAINE HYDROCHLORIDE 7.5 MG/ML
INJECTION, SOLUTION INTRASPINAL PRN
Status: DISCONTINUED | OUTPATIENT
Start: 2020-01-20 | End: 2020-01-20 | Stop reason: SDUPTHER

## 2020-01-20 RX ORDER — DEXTROSE MONOHYDRATE 50 MG/ML
100 INJECTION, SOLUTION INTRAVENOUS PRN
Status: DISCONTINUED | OUTPATIENT
Start: 2020-01-20 | End: 2020-01-20

## 2020-01-20 RX ORDER — OXYCODONE HYDROCHLORIDE AND ACETAMINOPHEN 5; 325 MG/1; MG/1
1 TABLET ORAL EVERY 4 HOURS PRN
Status: DISCONTINUED | OUTPATIENT
Start: 2020-01-20 | End: 2020-01-21

## 2020-01-20 RX ORDER — NALOXONE HYDROCHLORIDE 0.4 MG/ML
0.4 INJECTION, SOLUTION INTRAMUSCULAR; INTRAVENOUS; SUBCUTANEOUS PRN
Status: CANCELLED | OUTPATIENT
Start: 2020-01-20

## 2020-01-20 RX ORDER — KETOROLAC TROMETHAMINE 30 MG/ML
30 INJECTION, SOLUTION INTRAMUSCULAR; INTRAVENOUS EVERY 6 HOURS
Status: COMPLETED | OUTPATIENT
Start: 2020-01-20 | End: 2020-01-21

## 2020-01-20 RX ORDER — ACETAMINOPHEN 325 MG/1
650 TABLET ORAL EVERY 4 HOURS PRN
Status: DISCONTINUED | OUTPATIENT
Start: 2020-01-20 | End: 2020-01-22 | Stop reason: HOSPADM

## 2020-01-20 RX ORDER — SODIUM CHLORIDE 9 MG/ML
INJECTION, SOLUTION INTRAVENOUS CONTINUOUS PRN
Status: DISCONTINUED | OUTPATIENT
Start: 2020-01-20 | End: 2020-01-20 | Stop reason: SDUPTHER

## 2020-01-20 RX ORDER — 0.9 % SODIUM CHLORIDE 0.9 %
1000 INTRAVENOUS SOLUTION INTRAVENOUS ONCE
Status: COMPLETED | OUTPATIENT
Start: 2020-01-20 | End: 2020-01-20

## 2020-01-20 RX ORDER — MORPHINE SULFATE 1 MG/ML
INJECTION, SOLUTION EPIDURAL; INTRATHECAL; INTRAVENOUS PRN
Status: DISCONTINUED | OUTPATIENT
Start: 2020-01-20 | End: 2020-01-20 | Stop reason: SDUPTHER

## 2020-01-20 RX ORDER — LANOLIN 100 %
OINTMENT (GRAM) TOPICAL
Status: DISCONTINUED | OUTPATIENT
Start: 2020-01-20 | End: 2020-01-22 | Stop reason: HOSPADM

## 2020-01-20 RX ORDER — SODIUM CHLORIDE 0.9 % (FLUSH) 0.9 %
10 SYRINGE (ML) INJECTION PRN
Status: DISCONTINUED | OUTPATIENT
Start: 2020-01-20 | End: 2020-01-22 | Stop reason: HOSPADM

## 2020-01-20 RX ORDER — DOCUSATE SODIUM 100 MG/1
100 CAPSULE, LIQUID FILLED ORAL 2 TIMES DAILY
Status: DISCONTINUED | OUTPATIENT
Start: 2020-01-20 | End: 2020-01-22 | Stop reason: HOSPADM

## 2020-01-20 RX ORDER — LIDOCAINE HYDROCHLORIDE 10 MG/ML
INJECTION, SOLUTION EPIDURAL; INFILTRATION; INTRACAUDAL; PERINEURAL PRN
Status: DISCONTINUED | OUTPATIENT
Start: 2020-01-20 | End: 2020-01-20 | Stop reason: SDUPTHER

## 2020-01-20 RX ORDER — DEXTROSE MONOHYDRATE 25 G/50ML
12.5 INJECTION, SOLUTION INTRAVENOUS PRN
Status: DISCONTINUED | OUTPATIENT
Start: 2020-01-20 | End: 2020-01-20

## 2020-01-20 RX ADMIN — LIDOCAINE HYDROCHLORIDE 2 ML: 10 INJECTION, SOLUTION EPIDURAL; INFILTRATION; INTRACAUDAL; PERINEURAL at 11:55

## 2020-01-20 RX ADMIN — ONDANSETRON 4 MG: 2 INJECTION INTRAMUSCULAR; INTRAVENOUS at 11:11

## 2020-01-20 RX ADMIN — MORPHINE SULFATE 0.2 MG: 1 INJECTION EPIDURAL; INTRATHECAL; INTRAVENOUS at 11:57

## 2020-01-20 RX ADMIN — SODIUM CITRATE AND CITRIC ACID MONOHYDRATE 30 ML: 500; 334 SOLUTION ORAL at 11:37

## 2020-01-20 RX ADMIN — PHENYLEPHRINE HYDROCHLORIDE 100 MCG: 10 INJECTION INTRAVENOUS at 12:28

## 2020-01-20 RX ADMIN — MORPHINE SULFATE 2 MG: 1 INJECTION EPIDURAL; INTRATHECAL; INTRAVENOUS at 12:33

## 2020-01-20 RX ADMIN — Medication 1 MILLI-UNITS/MIN: at 12:23

## 2020-01-20 RX ADMIN — BUPIVACAINE HYDROCHLORIDE IN DEXTROSE 1.4 ML: 7.5 INJECTION, SOLUTION SUBARACHNOID at 11:57

## 2020-01-20 RX ADMIN — CEFAZOLIN SODIUM 2 G: 10 INJECTION, POWDER, FOR SOLUTION INTRAVENOUS at 11:45

## 2020-01-20 RX ADMIN — PHENYLEPHRINE HYDROCHLORIDE 100 MCG: 10 INJECTION INTRAVENOUS at 12:13

## 2020-01-20 RX ADMIN — MORPHINE SULFATE 2 MG: 1 INJECTION EPIDURAL; INTRATHECAL; INTRAVENOUS at 12:38

## 2020-01-20 RX ADMIN — SODIUM CHLORIDE: 9 INJECTION, SOLUTION INTRAVENOUS at 11:52

## 2020-01-20 RX ADMIN — KETOROLAC TROMETHAMINE 30 MG: 30 INJECTION, SOLUTION INTRAMUSCULAR; INTRAVENOUS at 12:36

## 2020-01-20 RX ADMIN — CEFAZOLIN 2 G: 10 INJECTION, POWDER, FOR SOLUTION INTRAVENOUS at 19:54

## 2020-01-20 RX ADMIN — Medication 900 ML/HR: at 12:29

## 2020-01-20 RX ADMIN — SODIUM CHLORIDE, POTASSIUM CHLORIDE, SODIUM LACTATE AND CALCIUM CHLORIDE: 600; 310; 30; 20 INJECTION, SOLUTION INTRAVENOUS at 16:15

## 2020-01-20 RX ADMIN — KETOROLAC TROMETHAMINE 30 MG: 30 INJECTION, SOLUTION INTRAMUSCULAR at 18:19

## 2020-01-20 RX ADMIN — SODIUM CHLORIDE 1000 ML: 9 INJECTION, SOLUTION INTRAVENOUS at 10:30

## 2020-01-20 RX ADMIN — DOCUSATE SODIUM 100 MG: 100 CAPSULE, LIQUID FILLED ORAL at 19:55

## 2020-01-20 ASSESSMENT — PULMONARY FUNCTION TESTS
PIF_VALUE: 0
PIF_VALUE: 1
PIF_VALUE: 1
PIF_VALUE: 0
PIF_VALUE: 1
PIF_VALUE: 0
PIF_VALUE: 1
PIF_VALUE: 0
PIF_VALUE: 1
PIF_VALUE: 0
PIF_VALUE: 1
PIF_VALUE: 0
PIF_VALUE: 1
PIF_VALUE: 1
PIF_VALUE: 0
PIF_VALUE: 1
PIF_VALUE: 1
PIF_VALUE: 0
PIF_VALUE: 1
PIF_VALUE: 0
PIF_VALUE: 0
PIF_VALUE: 1
PIF_VALUE: 1
PIF_VALUE: 0
PIF_VALUE: 0
PIF_VALUE: 1
PIF_VALUE: 0
PIF_VALUE: 1
PIF_VALUE: 0
PIF_VALUE: 1
PIF_VALUE: 1
PIF_VALUE: 0
PIF_VALUE: 0
PIF_VALUE: 1
PIF_VALUE: 0
PIF_VALUE: 1
PIF_VALUE: 0

## 2020-01-20 ASSESSMENT — PAIN SCALES - GENERAL: PAINLEVEL_OUTOF10: 4

## 2020-01-20 ASSESSMENT — PAIN DESCRIPTION - PROGRESSION: CLINICAL_PROGRESSION: GRADUALLY WORSENING

## 2020-01-20 ASSESSMENT — LIFESTYLE VARIABLES: SMOKING_STATUS: 1

## 2020-01-20 ASSESSMENT — PAIN DESCRIPTION - ONSET: ONSET: ON-GOING

## 2020-01-20 NOTE — L&D DELIVERY NOTE
Mother's Information    Labor Events     labor?:  No     Mother Delivery Information    Surgical or Additional Est. Blood Loss (mL):  0 (View Only):  Edit in Flowsheets   Combined Est. Blood Loss (mL):  0        Ashley Beasley Boy Raeann Durham [[id number]]    Labor Events     labor?:  No   steroids?:  None  Cervical ripening date/time:     Rupture date/time:     Rupture type:  Artificial=AROM  Fluid color:  Clear  Fluid odor:  None   Additional complications:   OB: DELIVERY - COMPLICATIONS   IUGR (intrauterine growth restriction) affecting care of mother         Anesthesia    Method:  Spinal  [facility]     Assisted Delivery Details    Forceps attempted?:  No  Vacuum extractor attempted?:  No                     Document Additional Attempt       Document Additional Attempt                             Shoulder Dystocia    Shoulder dystocia present?:  No                  Add Second Maneuver          Add Third Maneuver          Add Fourth Maneuver          Add Fifth Maneuver          Add Sixth Maneuver          Add Seventh Maneuver          Add Eighth Maneuver          Add Ninth Maneuver              Presentation    Presentation:  Vertex  Position:  Left  _:  Occiput  _:  Anterior      Information    Head delivery date/time:  2020 12:22:00   Changing the 's delivery date/time could affect patient care.:     Delivery date/time:   20 1222   Delivery type:  , Low Transverse    Details:   Trial of labor?:  No    categorization:  Repeat    priority:  Scheduled   Indications for :  Prior Uterine Surgery   Skin Incision Type:  Pfannenstiel   Uterine Incision:  Low Transverse         Delivery Providers    Delivering clinician:  Leticia Cline DO   Provider Role    Leticia Cline,  Surgeon    Shola Elmore, DO Assistant Surgeon    Iris Andujar Chief Resident    Makenna Lofton RN     Natali Ryan Negron, MAYITO Lewis,  Resident      Cord    Vessels:  3 Vessels  Complications:  Nuchal  Nuchal intervention:  reduced  Nuchal cord description:  loose nuchal cord  Number of loops:  2  Delayed cord clamping?:  Yes  Cord clamped date/time:  2020 1223  Cord blood disposition:  Lab  Gases sent?:  Yes  Stem cell collection (by provider):   No     Placenta    Date/time:  2020 12:23:00  Removal:  Spontaneous  Appearance:  Intact  Disposition:  Pathology     Delivery Resuscitation    Method:  Bulb Suction, Stimulation     Apgars    Living status:  Living  Apgars   1 Minute:   5 Minute:   10 Minute 15 Minute 20 Minute   Skin Color: 1  1       Heart Rate: 2  2       Reflex Irritability: 2  2       Muscle Tone: 2  2       Respiratory Effort: 2  2       Total: 9  9               Apgars Assigned By:  DR Herbert Pham      Measurements    Weight:  1986 g Length:  44.5 cm   Head circumference:  33 cm Chest circumference:  29.5 cm   Abdominal girth:  29.5 cm    ID band #:  [id number]       Delivery Information    Surgical or additional est. blood loss (mL):  0 (View Only):  Edit in Flowsheets   Combined est. blood loss (mL):  0     Other Procedures    Procedures:  None

## 2020-01-20 NOTE — PROGRESS NOTES
7/10/2019 Repeat level later in pregnancy  1/6: thyroid studies normal       Contact with hypodermic needle 02/08/2019    Pnctr w/o FB of r idx fngr w/o damage to nail, subs 02/08/2019    IBS (irritable bowel syndrome)          Lab Results:  Admission on 01/20/2020   Component Date Value Ref Range Status    WBC 01/20/2020 16.1* 3.5 - 11.0 k/uL Final    RBC 01/20/2020 4.14  4.0 - 5.2 m/uL Final    Hemoglobin 01/20/2020 12.9  12.0 - 16.0 g/dL Final    Hematocrit 01/20/2020 38.0  36 - 46 % Final    MCV 01/20/2020 91.9  80 - 100 fL Final    MCH 01/20/2020 31.3  26 - 34 pg Final    MCHC 01/20/2020 34.0  31 - 37 g/dL Final    RDW 01/20/2020 14.2  11.5 - 14.9 % Final    Platelets 83/92/5527 251  150 - 450 k/uL Final    MPV 01/20/2020 8.6  6.0 - 12.0 fL Final    NRBC Automated 01/20/2020 NOT REPORTED  per 100 WBC Final    Color, UA 01/20/2020 DARK YELLOW* YELLOW Final    Turbidity UA 01/20/2020 CLOUDY* CLEAR Final    Glucose, Ur 01/20/2020 NEGATIVE  NEGATIVE Final    Bilirubin Urine 01/20/2020 Presumptive positive. Unable to confirm due to unavailability of reagent. * NEGATIVE Final    Ketones, Urine 01/20/2020 MOD* NEGATIVE Final    Specific [place], UA 01/20/2020 1.023  1.000 - 1.030 Final    Urine Hgb 01/20/2020 NEGATIVE  NEGATIVE Final    pH, UA 01/20/2020 6.5  5.0 - 8.0 Final    Protein, UA 01/20/2020 TRACE* NEGATIVE Final    Urobilinogen, Urine 01/20/2020 Normal  Normal Final    Nitrite, Urine 01/20/2020 NEGATIVE  NEGATIVE Final    Leukocyte Esterase, Urine 01/20/2020 SMALL* NEGATIVE Final    Urinalysis Comments 01/20/2020 NOT REPORTED   Final    POC Glucose 01/20/2020 76  65 - 105 mg/dL Final   Hospital Outpatient Visit on 01/09/2020   Component Date Value Ref Range Status    Specimen Description 01/09/2020 . VAGINAL SPECIMEN   Final    Special Requests 01/09/2020 NOT REPORTED   Final    Culture 01/09/2020 NEGATIVE FOR GROUP B STREPTOCOCCI   Final   Hospital Outpatient Visit on 01/06/2020

## 2020-01-20 NOTE — ANESTHESIA PROCEDURE NOTES
Spinal Block    Patient location during procedure: OB  Start time: 1/20/2020 11:54 AM  End time: 1/20/2020 11:57 AM  Reason for block: primary anesthetic  Staffing  Anesthesiologist: Jermaine Francisco MD  Resident/CRNA: GRACIELA Conte - CRNA  Performed: resident/CRNA   Preanesthetic Checklist  Completed: patient identified, site marked, surgical consent, pre-op evaluation, timeout performed, IV checked, risks and benefits discussed, monitors and equipment checked, anesthesia consent given, oxygen available and patient being monitored  Spinal Block  Patient position: sitting  Prep: Betadine  Patient monitoring: cardiac monitor, continuous pulse ox and frequent blood pressure checks  Approach: midline  Location: L3/L4  Procedures: paresthesia technique  Provider prep: mask and sterile gloves  Local infiltration: lidocaine  Dose: 0.2  Agent: bupivacaine  Adjuvant: duramorph  Dose: 1.4  Dose: 1.4  Needle  Needle type: Pencan   Needle gauge: 24 G  Needle length: 4 in  Assessment  Sensory level: T6  Swirl obtained: Yes  CSF: clear  Attempts: 1  Hemodynamics: stable

## 2020-01-20 NOTE — OP NOTE
[facility] [place]  OBSTETRICAL  PHYSICIAN POST-OPERATIVE  NOTE:      Patient Name: Yojana Clements  Patient :   [facility]  Admission Date/Time: 2020 10:00 AM  Primary Care Physician: GRACIELA Shanks CNP  MRN #: [id number]  [facility] #: [id number]        Date: 2020  Time: 1:02 PM        Pre-operative Diagnosis:   Yojana Clements is a 34 y.o. female at 42w0d      Term pregnancy, Single fetus and Pregnancy complicated by: See Problem List  Patient Active Problem List    Diagnosis Date Noted    45 weeks gestation of pregnancy 2020    Poor fetal growth affecting management of mother in third trimester     Delivery with history of      Hx CS x1 2019    Abnormal urinalysis 2019     Overview Note:     19: UA showed large Hgb in urine, microscopic UA showed  RBC. Rx Keflex given to patient, will need repeat UA. UCx pending       GDMA2 2019     Overview Note:     2019 3 hour GTT with 2 abnormals  Consult to MFM  2019 NPH 8 units before bedtime  32 week  testing  Interval fetal growth scans every 4 weeks        TSH deficiency 07/10/2019     Overview Note:     7/10/2019 Repeat level later in pregnancy  : thyroid studies normal       Contact with hypodermic needle 2019    Pnctr w/o FB of r idx fngr w/o damage to nail, subs 2019    IBS (irritable bowel syndrome)      1. [email or web address]  2. IUGR  3. GDM A2  4. Previous  TOLAC  5. [facility] recommended delivery timimg      Post-operative Diagnosis:    Living  infant(s) and Male  Same as Pre-Op   with suspected penile torsion per neonatology-Circumcision Held      Procedures:  1.  Section- repeat : Low Cervical, Transverse    2. Abdominal Delivery of a Live Born     male          Surgeon:  Saul Gordon DO      Assistants:  1. Gloria Yonug DO  2. Kan Burt DO  3.  Licha Comer DO    OR  categorization:  Repeat    priority:  Scheduled   Indications for :  Prior Uterine Surgery   Skin Incision Type:  Pfannenstiel   Uterine Incision:  Low Transverse         Delivery Providers    Delivering clinician:  Tenisha Briceño DO   Provider Role    Tenisha Briceño,  Surgeon    Olga Yarbrough, DO Assistant Surgeon    Haily Palmer Chief Resident    Mati Ritchie, RN     Natali Rowland, RN     Janell Abarca, DO Resident      Cord    Vessels:  3 Vessels  Complications:  Nuchal  Nuchal intervention:  reduced  Nuchal cord description:  loose nuchal cord  Number of loops:  2  Delayed cord clamping?:  Yes  Cord clamped date/time:  2020 1223  Cord blood disposition:  Lab  Gases sent?:  Yes  Stem cell collection (by provider):   No     Placenta    Date/time:  2020 12:23:00  Removal:  Spontaneous  Appearance:  Intact  Disposition:  Pathology     Delivery Resuscitation    Method:  Bulb Suction, Stimulation     Apgars    Living status:  Living  Apgars   1 Minute:   5 Minute:   10 Minute 15 Minute 20 Minute   Skin Color: 1  1       Heart Rate: 2  2       Reflex Irritability: 2  2       Muscle Tone: 2  2       Respiratory Effort: 2  2       Total: 9  9               Apgars Assigned By:  DR Osiris Cummins     [place] Measurements    Weight:  1986 g Length:  44.5 cm   Head circumference:  33 cm Chest circumference:  29.5 cm   Abdominal girth:  29.5 cm    ID band #:  [id number]       Delivery Information    Surgical or additional est. blood loss (mL):  0 (View Only):  Edit in Flowsheets   Combined est. blood loss (mL):  0     Other Procedures    Procedures:  None              Living  infant(s) and Male    Cephalic  left occiput anterior  Other:       Amniotic Fluid was: Clear  A Nuchal Cord: was present and reduced x 2  A Spontaneous Cry Was Noted: Yes  The Baby: was suctioned        The Placenta Was Removed:  intact, whole and that the umbilical cord had three vessels noted    cord gasses were obtained and sent to the lab, cord blood was obtained and sent to the lab and Pitocin, 20 milliunits in 1 liter of ringers lactate was administered, wide open, to assist with uterine contraction    The umbilical cord had delayed clamping of 1 minute: Yes    The Maternal Adnexa was Visualized. There were not any adnexal masses. Body mass index is 20.73 kg/m². (Wound Vac indicated for BMI Value>35)    Wound Vac Applied to Incision: No      Specimen:  Placenta sent to pathology yes  * No specimens in log *     Condition:  infant stable to general nursery and mother stable    Blood Type and Rh: O POSITIVE        Rubella Immunity Status:    Rubella Antibody, IGG   Date Value Ref Range Status   07/10/2019 171.9 IU/mL Final     Comment:                 REFERENCE RANGE:  <5.0       NON-REACTIVE (non-immune)  5.0 TO 9.9 EQUIVOCAL  >=10.0     REACTIVE     (immune)                 Infant Feeding:    BOTTLE    Circumcision: HELD D/T SUSPECTED PENILE TORSION    All Sponge Counts Were Correct x 3 calls-Prior to closure of Peritoneum, Fascia, and Skin Layers: Yes        Attending Attestation: I was present and scrubbed for the entire procedure. SCIP will be utilized for Antibiotics: Ancef  Allergies as of 2019 - Review Complete 2019   Allergen Reaction Noted    Arestin [minocycline] Hives 2019         EPC's in  Place for DVT prophylaxis      Procedure: (Understanding of limitations from template op-reports exist)  Arden Christianson is a 34 y.o. female  @ 38w0d for  delivery. The risks, benefits, complications, alternative treatment options, and expected outcomes were discussed with the patient. Risks of surgery were discussed, including but not limited to: pain, bleeding, need for blood transfusion, infection, injury to internal organs including intestines, bladder, uterus, fallopian tubes, ovaries and rarely injury to the fetus. Postoperative complications including pain, bleeding, need for blood transfusion, infection, re-operation, infection of the incisions were also explained. The patient verbalized understanding and agreed to proceed, giving informed consent. The patient was taken to Operating Room, identified as Tiana Ramos and the procedure verified as  Delivery. A Time Out was held and the above information confirmed. Procedure Details:  After Spinal Anesthetic with Duramorph was instilled in the seated position the patient was returned to the supine position with a wedge placed under her right hip. FHT's were obtained, the patient was prepped and draped in the usual sterile manner. A three minute drape delay was completed. The bladder was draining clear urine. SCIP antibiotics were infused, EPC's were in place and operating. A time out was completed. There was an adequate skin check both high and low. The old cicatrix was removed with the #10 blade #1 . A Pfannenstiel incision was made with a #10 scalpel and carried down to the fascia with bovie cautery. Fascial incision was made and extended transversely. The fascia was  from the underlying rectus tissue superiorly and inferiorly. The peritoneum was identified and entered superiorly. The peritoneal incision was extended superiorly and inferiorly, care not to involve the bowel or the bladder. The Beatriz SULLY retractor was placed inferiorly into the incision. The vesico-uterine reflection was developed and skeletonized off the lower uterine segment with blunt and sharp dissection. The [facility] [employer] retractor was reapproximated. A low transverse uterine incision was made and the uterine cavity was entered with extreme caution with the blunt end of the scalpel. This incision was extended using digital dissection in a cephalad to caudad manner. A live male infant was delivered without complications.  The head was delivered through the incision and fundal pressure was used for the remaining body of the . There was a nuchal cord. It was reduced with ease x 2 loop. The  had bulb suction of the mouth and nares. There was a spontaneous cry. The infant was vigorous  and there was delayed cord clamping of 1 minute. Please find the  Apgar scores and weight above in the delivery summary. The umbilical cord was then clamped and cut, the infant was handed off to the awaiting neonatology team staff member attending the delivery. Then cord specimen and cord blood was collected and the placenta was delivered using gentle traction and fundal massage, (Expressed), it was intact and appeared normal.  The uterus was cleared of all clots and debris and was firm and contracted. IV Pitocin was infusing. An outflow tract was confirmed. The uterine incision was closed with running locked sutures of 0 Vicryl, starting at each corner with figure of \"8's\" and moving to the midline. Excellent hemostasis was observed. Gutters were cleared of all clots and debris. The pelvis was irrigated with warm, sterile water. Uterine incision was reinspected and hemostatic. The uterus, bilateral tubes and ovaries were normal.   The peritoneum was closed with 2-0 vicryl. The fascia was then reapproximated with running sutures of 0 Vicryl, starting at each corner and moving to the midline. It was checked for any defects and there were none. The subcutaneous tissue was irrigated, any bleeding sites were cauterized. The subcuticular space was infiltrated with 0.5% Plain marcaine to limit breakthrough post operative pain. The skin was closed in a subcuticular fashion with 4-0 vicryl, then covered with tincture of benzoin and steri-strips,  It was dressed with Telfa and an ABD pad then covered with a large Tegaderm dressing. Sponge, Needle and instrument counts were called for and found to be correct prior to closure of the peritoneum, fascia, and skin layers.  The urine was

## 2020-01-20 NOTE — ANESTHESIA PRE PROCEDURE
Airway: Mallampati: II  TM distance: >3 FB   Neck ROM: full  Mouth opening: > = 3 FB Dental: normal exam         Pulmonary:normal exam  breath sounds clear to auscultation  (+) current smoker                           Cardiovascular:Negative CV ROS            Rhythm: regular  Rate: normal                    Neuro/Psych:   (+) psychiatric history:depression/anxiety             GI/Hepatic/Renal:            ROS comment: IBS  Nausea throughout pregnancy. Endo/Other:    (+) Diabetes (FBS at 10.24 - 76 (runs in the 90s at home)), hypothyroidism::., .                  ROS comment:   GA -38wks  Previous C/S X1(with Epidural)    Marijuana - 3 Times per week Abdominal:           Vascular: negative vascular ROS. Anesthesia Plan      spinal     ASA 2           MIPS: Postoperative opioids intended and Prophylactic antiemetics administered. Anesthetic plan and risks discussed with patient. Plan discussed with CRNA.             Zofran 4mg IVP given at 11.11 for c/o nausea      Kiran Ramires MD   2020

## 2020-01-21 LAB
CULTURE: NORMAL
GLUCOSE BLD-MCNC: 62 MG/DL (ref 65–105)
HCT VFR BLD CALC: 30.8 % (ref 36–46)
HEMOGLOBIN: 10.2 G/DL (ref 12–16)
Lab: NORMAL
SPECIMEN DESCRIPTION: NORMAL

## 2020-01-21 PROCEDURE — 85014 HEMATOCRIT: CPT

## 2020-01-21 PROCEDURE — 36415 COLL VENOUS BLD VENIPUNCTURE: CPT

## 2020-01-21 PROCEDURE — 82947 ASSAY GLUCOSE BLOOD QUANT: CPT

## 2020-01-21 PROCEDURE — 6370000000 HC RX 637 (ALT 250 FOR IP): Performed by: STUDENT IN AN ORGANIZED HEALTH CARE EDUCATION/TRAINING PROGRAM

## 2020-01-21 PROCEDURE — 6360000002 HC RX W HCPCS: Performed by: STUDENT IN AN ORGANIZED HEALTH CARE EDUCATION/TRAINING PROGRAM

## 2020-01-21 PROCEDURE — 99024 POSTOP FOLLOW-UP VISIT: CPT | Performed by: OBSTETRICS & GYNECOLOGY

## 2020-01-21 PROCEDURE — 85018 HEMOGLOBIN: CPT

## 2020-01-21 PROCEDURE — 1220000000 HC SEMI PRIVATE OB R&B

## 2020-01-21 RX ORDER — OXYCODONE HYDROCHLORIDE AND ACETAMINOPHEN 5; 325 MG/1; MG/1
1 TABLET ORAL EVERY 4 HOURS PRN
Qty: 24 TABLET | Refills: 0 | Status: SHIPPED | OUTPATIENT
Start: 2020-01-21 | End: 2020-01-22 | Stop reason: HOSPADM

## 2020-01-21 RX ORDER — NAPROXEN 500 MG/1
500 TABLET ORAL EVERY 12 HOURS
Status: DISCONTINUED | OUTPATIENT
Start: 2020-01-21 | End: 2020-01-22 | Stop reason: HOSPADM

## 2020-01-21 RX ORDER — HYDROCODONE BITARTRATE AND ACETAMINOPHEN 5; 325 MG/1; MG/1
1 TABLET ORAL EVERY 6 HOURS PRN
Status: DISCONTINUED | OUTPATIENT
Start: 2020-01-21 | End: 2020-01-22

## 2020-01-21 RX ORDER — PSEUDOEPHEDRINE HCL 30 MG
100 TABLET ORAL 2 TIMES DAILY
Qty: 60 CAPSULE | Refills: 0 | Status: SHIPPED | OUTPATIENT
Start: 2020-01-21 | End: 2020-01-31

## 2020-01-21 RX ORDER — IBUPROFEN 800 MG/1
800 TABLET ORAL EVERY 8 HOURS PRN
Qty: 30 TABLET | Refills: 0 | Status: SHIPPED | OUTPATIENT
Start: 2020-01-21 | End: 2020-07-09 | Stop reason: ALTCHOICE

## 2020-01-21 RX ORDER — HYDROCODONE BITARTRATE AND ACETAMINOPHEN 5; 325 MG/1; MG/1
2 TABLET ORAL EVERY 6 HOURS PRN
Status: DISCONTINUED | OUTPATIENT
Start: 2020-01-21 | End: 2020-01-22

## 2020-01-21 RX ORDER — ONDANSETRON 4 MG/1
4 TABLET, ORALLY DISINTEGRATING ORAL EVERY 8 HOURS PRN
Status: DISCONTINUED | OUTPATIENT
Start: 2020-01-21 | End: 2020-01-22 | Stop reason: HOSPADM

## 2020-01-21 RX ADMIN — HYDROCODONE BITARTRATE AND ACETAMINOPHEN 1 TABLET: 5; 325 TABLET ORAL at 18:43

## 2020-01-21 RX ADMIN — NAPROXEN 500 MG: 500 TABLET ORAL at 22:08

## 2020-01-21 RX ADMIN — NAPROXEN 500 MG: 500 TABLET ORAL at 10:25

## 2020-01-21 RX ADMIN — MAGNESIUM HYDROXIDE 30 ML: 400 SUSPENSION ORAL at 22:08

## 2020-01-21 RX ADMIN — DOCUSATE SODIUM 100 MG: 100 CAPSULE, LIQUID FILLED ORAL at 09:56

## 2020-01-21 RX ADMIN — HYDROCODONE BITARTRATE AND ACETAMINOPHEN 1 TABLET: 5; 325 TABLET ORAL at 22:09

## 2020-01-21 RX ADMIN — KETOROLAC TROMETHAMINE 30 MG: 30 INJECTION, SOLUTION INTRAMUSCULAR at 00:42

## 2020-01-21 RX ADMIN — KETOROLAC TROMETHAMINE 30 MG: 30 INJECTION, SOLUTION INTRAMUSCULAR at 05:52

## 2020-01-21 RX ADMIN — ONDANSETRON 4 MG: 4 TABLET, ORALLY DISINTEGRATING ORAL at 18:43

## 2020-01-21 RX ADMIN — DOCUSATE SODIUM 100 MG: 100 CAPSULE, LIQUID FILLED ORAL at 22:08

## 2020-01-21 ASSESSMENT — PAIN SCALES - GENERAL
PAINLEVEL_OUTOF10: 4
PAINLEVEL_OUTOF10: 3
PAINLEVEL_OUTOF10: 5
PAINLEVEL_OUTOF10: 3
PAINLEVEL_OUTOF10: 6
PAINLEVEL_OUTOF10: 2

## 2020-01-21 ASSESSMENT — PAIN DESCRIPTION - PAIN TYPE: TYPE: SURGICAL PAIN

## 2020-01-21 ASSESSMENT — PAIN DESCRIPTION - LOCATION: LOCATION: ABDOMEN

## 2020-01-21 NOTE — PLAN OF CARE
Problem: Pain:  Goal: Pain level will decrease  Description  Pain level will decrease  Outcome: Met This Shift  Goal: Control of acute pain  Description  Control of acute pain  Outcome: Met This Shift  Goal: Control of chronic pain  Description  Control of chronic pain  Outcome: Met This Shift     Problem: Fluid Volume - Imbalance:  Goal: Absence of postpartum hemorrhage signs and symptoms  Description  Absence of postpartum hemorrhage signs and symptoms  Outcome: Met This Shift  Goal: Absence of imbalanced fluid volume signs and symptoms  Description  Absence of imbalanced fluid volume signs and symptoms  Outcome: Met This Shift     Problem: Infection - Surgical Site:  Goal: Will show no infection signs and symptoms  Description  Will show no infection signs and symptoms  Outcome: Met This Shift     Problem: Mood - Altered:  Goal: Mood stable  Description  Mood stable  Outcome: Met This Shift     Problem: Nausea/Vomiting:  Goal: Absence of nausea/vomiting  Description  Absence of nausea/vomiting  Outcome: Met This Shift     Problem: Pain - Acute:  Goal: Pain level will decrease  Description  Pain level will decrease  Outcome: Met This Shift     Problem: Urinary Retention:  Goal: Urinary elimination within specified parameters  Description  Urinary elimination within specified parameters  Outcome: Met This Shift     Problem: Venous Thromboembolism:  Goal: Will show no signs or symptoms of venous thromboembolism  Description  Will show no signs or symptoms of venous thromboembolism  Outcome: Met This Shift  Goal: Absence of signs or symptoms of impaired coagulation  Description  Absence of signs or symptoms of impaired coagulation  Outcome: Met This Shift

## 2020-01-21 NOTE — PROGRESS NOTES
SW spoke to pt regarding positive toxicology report([facility]). Pt's admits to using and said the drug helps mange her bi polar symptoms better than medication. SW explained policy with [facility]. Pt verbalized understanding. SW made referral to [facility]. Report was made and someone will call this worker back with decision.

## 2020-01-21 NOTE — PROGRESS NOTES
POST OPERATIVE DAY # 1    Jacques Rose is a 34 y.o. female   This patient was seen and examined today. S/p RLTCS 20. Her pregnancy was complicated by:   Patient Active Problem List   Diagnosis    IBS (irritable bowel syndrome)    TSH deficiency    GDMA2    Hx CS x1    Abnormal urinalysis    Contact with hypodermic needle    Pnctr w/o FB of r idx fngr w/o damage to nail, subs    Poor fetal growth affecting management of mother in third trimester    Delivery with history of     RLTCS 20 M APG 9/ Wt 4#6       Today she is doing well without any chief complaint. Her lochia is light. She denies chest pain, shortness of breath, headache and blurred vision. She is not breast feeding and she denies any signs or symptoms of mastitis. She is ambulating well. She is voiding without difficulty with a morris catheter in place. She currently denies S/S of postpartum depression. Flatus present. Bowel movement absent. She is tolerating solids.     Vital Signs:  Vitals:    20 1503 20 1518 20 1625 20 1815   BP: 98/63 (!) 99/58 (!) 103/56 (!) 102/56   Pulse: 67 72 70 71   Resp: 14 16 16 16   Temp:   98 °F (36.7 °C) 96.8 °F (36 °C)   TempSrc:   Oral Infrared   SpO2:  98% 98% 99%   Weight:       Height:           Urine Input & Output last 24hrs:     Intake/Output Summary (Last 24 hours) at 2020 7162  Last data filed at 2020 0555  Gross per 24 hour   Intake 2810 ml   Output 1650 ml   Net 1160 ml       Physical Exam:  General:  no apparent distress, alert and cooperative  Neurologic:  alert, oriented, normal speech, no focal findings or movement disorder noted  Lungs:  No increased work of breathing, good air exchange, clear to auscultation bilaterally, no crackles or wheezing  Heart:  regular rate and rhythm    Abdomen: abdomen soft, non-distended, non-tender  Fundus: non-tender, normal size, firm, below umbilicus  Incision: compression dressing in place that appears

## 2020-01-21 NOTE — PROGRESS NOTES
Obstetrical Rounds:    POD#: [facility] Day: 2  Procedure: repeat  section    Date: 2020  Time: 9:50 AM        Patient Name: Tempie Sacks  Patient : [id number]  Room/Bed: 0151/9241-98  Admission Date/Time: 2020 10:00 AM  MRN #: [id number]  [facility] #: [id number]        Attending Physician Statement  I have discussed the care of Tempie Sacks, including pertinent history and exam findings,  with the resident. I have reviewed their note in the electronic medical record. I have seen and examined the patient and the key elements of all parts of the encounter have been performed/reviewed by me . I agree with the assessment, plan and orders as documented by the resident. Pt without c/c. Pt states pain well controlled. Pt tolerating regular diet. Pt ambulating & urinating without difficulty.  Pt requesting discharge tomorrow     Vitals:    20 0738   BP: (!) 101/51   Pulse: 62   Resp: 16   Temp: 97.5 °F (36.4 °C)   SpO2:        Admission on 2020   Component Date Value Ref Range Status    Expiration Date 2020,2359   Final    Arm Band Number 2020 [id number]   Final    ABO/Rh 2020 O POSITIVE   Final    Antibody Screen 2020 NEGATIVE   Final    WBC 2020 16.1* 3.5 - 11.0 k/uL Final    RBC 2020 4.14  4.0 - 5.2 m/uL Final    Hemoglobin 2020 12.9  12.0 - 16.0 g/dL Final    Hematocrit 2020 38.0  36 - 46 % Final    MCV 2020 91.9  80 - 100 fL Final    MCH 2020 31.3  26 - 34 pg Final    MCHC 2020 34.0  31 - 37 g/dL Final    RDW 2020 14.2  11.5 - 14.9 % Final    Platelets  251  150 - 450 k/uL Final    MPV 2020 8.6  6.0 - 12.0 fL Final    NRBC Automated 2020 NOT REPORTED  per 100 WBC Final    Amphetamine Screen, Ur 2020 NEGATIVE  NEGATIVE Final    Comment:       (Positive cutoff 1000 ng/mL)                  Barbiturate Screen, Ur 2020 NEGATIVE  NEGATIVE Final  Birth     During a  section (), an incision is made in the abdomen and uterus (womb) to deliver the baby. The normal hospital stay is 2-4 days. Steps to Take   Home Care    · For the first 1-2 weeks, ask for someone to help you at home. · Let people help you. Take frequent rest breaks. · For vaginal bleeding, use extra absorbent pads. · Keep the incision area clean and dry. · Ask your doctor about when it is safe to shower, bathe, or soak in water. · Avoid heavy lifting for six weeks. Diet    After a  birth, you will start with a clear liquid diet. Examples include: Jell-o, broth, and ginger ale. If you tolerate that, you can slowly go back to your regular diet. Stay away from anything greasy or spicy right after surgery. These types of foods can upset your stomach. Drink lots of fluids to prevent constipation. Physical Activity    · Do not lift anything heavier than your baby. · When shifting positions, use a pillow to support the area where the incisions were made. · Get up slowly. This will help you to avoid feeling dizzy or light headed. · Try to move around each day. Light physical activity will help with your recovery. · Ask your doctor when you will be able to go back to work. · Do not drive unless your doctor has given you permission to do so. Do not drive if you are taking prescription pain medicine. · Ask your doctor when you will be able to resume sexual activity. If you have not done so already, talk to your doctor about birth control options. Medications    Your doctor may recommend pain medicine to ease discomfort. If you are taking medicines, follow these general guidelines:   · Take your medicine as directed. Do not change the amount or the schedule. · Do not stop taking them without talking to your doctor. · Do not share them. · Know what the results and side effects. Report them to your doctor.    · Some drugs can be dangerous when mixed. Talk to a doctor or pharmacist if you are taking more than one drug. This includes over-the-counter medicine and herb or dietary supplements. · Plan ahead for refills so you don't run out. Lifestyle Changes    You and your doctor will plan lifestyle changes that will help you recover. To get encouragement and learn strategies, consider joining a support group for new mothers. Follow-up   Make a follow-up appointment as directed by your doctor. Call Your Doctor If Any of the Following Occurs   After you leave the hospital, call your doctor if any of the following occurs:   · Signs of infection, including fever and chills   · Heavy vaginal bleeding   · Foul-smelling vaginal discharge   · Excessive bleeding, redness, swelling, increasing pain or discharge from the incision site   · Nausea and/or vomiting that you cannot control with the medicines you were given after surgery, or which persist for more than two days after discharge from the hospital   · Pain that you cannot control with the medicines you have been given   · Swelling and/or pain in one or both legs   · Cough, shortness of breath, or chest pain   · Joint pain, fatigue, stiffness, rash, or other new symptoms   · Become dizzy or faint   If you think you have an emergency,  CALL 911  . Home care, Restrictions,  Follow up Care, and birth control review completed    RTO 2 weeks    Secondary Smoke risks and Sudden Infant Death Syndrome were reviewed with recommendations. Cessation options discussed. Signs and Symptoms of Post Partum Depression were reviewed. The patient is to call if any occur. Signs and symptoms of Mastitis were reviewed. The patient is to call if any occur for follow up.       Attending's Name:  Electronically signed by Ross Lefort, DO on 1/21/2020 at 9:50 AM

## 2020-01-21 NOTE — PROGRESS NOTES
POST OPERATIVE DAY # 2    Joe Clements is a 34 y.o. female   This patient was seen and examined today. S/p RLTCS 20    Her pregnancy was complicated by:   Patient Active Problem List   Diagnosis    IBS (irritable bowel syndrome)    TSH deficiency    GDMA2    Hx CS x1    Abnormal urinalysis    Contact with hypodermic needle    Pnctr w/o FB of r idx fngr w/o damage to nail, subs    Poor fetal growth affecting management of mother in third trimester    Delivery with history of     RLTCS 20 M APG 9/9 Wt 4#6    Postpartum state       Today she is doing well without any chief complaint. Her lochia is light. She denies chest pain, shortness of breath, headache and blurred vision. She is not breast feeding and she denies any signs or symptoms of mastitis. She is ambulating well. She is voiding without difficulty. She currently denies S/S of postpartum depression. Flatus present. Bowel movement present. She is tolerating solids.     Vital Signs:  Vitals:    20 0738 20 1246 20 1537 20 1946   BP: (!) 101/51 122/73 (!) 107/57 106/69   Pulse: 62 80 65 63   Resp: 16 16 16    Temp: 97.5 °F (36.4 °C) 97.5 °F (36.4 °C) 97.9 °F (36.6 °C) 98.2 °F (36.8 °C)   TempSrc: Infrared Infrared Infrared Infrared   SpO2:       Weight:       Height:         Urine Input & Output last 24hrs:     Intake/Output Summary (Last 24 hours) at 2020 8675  Last data filed at 2020 0555  Gross per 24 hour   Intake --   Output 700 ml   Net -700 ml       Physical Exam:  General:  no apparent distress, alert and cooperative  Neurologic:  alert, oriented, normal speech, no focal findings or movement disorder noted  Lungs:  No increased work of breathing, good air exchange, clear to auscultation bilaterally, no crackles or wheezing  Heart:  regular rate and rhythm    Abdomen: abdomen soft, non-distended, non-tender  Fundus: non-tender, normal size, firm, below umbilicus  Incision: clean, dry and

## 2020-01-22 VITALS
TEMPERATURE: 98.1 F | HEART RATE: 66 BPM | HEIGHT: 63 IN | DIASTOLIC BLOOD PRESSURE: 57 MMHG | OXYGEN SATURATION: 99 % | BODY MASS INDEX: 20.73 KG/M2 | WEIGHT: 117 LBS | RESPIRATION RATE: 16 BRPM | SYSTOLIC BLOOD PRESSURE: 99 MMHG

## 2020-01-22 LAB — SURGICAL PATHOLOGY REPORT: NORMAL

## 2020-01-22 PROCEDURE — 6370000000 HC RX 637 (ALT 250 FOR IP): Performed by: STUDENT IN AN ORGANIZED HEALTH CARE EDUCATION/TRAINING PROGRAM

## 2020-01-22 RX ORDER — HYDROCODONE BITARTRATE AND ACETAMINOPHEN 5; 325 MG/1; MG/1
1 TABLET ORAL EVERY 4 HOURS PRN
Status: DISCONTINUED | OUTPATIENT
Start: 2020-01-22 | End: 2020-01-22

## 2020-01-22 RX ORDER — HYDROCODONE BITARTRATE AND ACETAMINOPHEN 5; 325 MG/1; MG/1
1 TABLET ORAL EVERY 4 HOURS PRN
Qty: 24 TABLET | Refills: 0 | Status: SHIPPED | OUTPATIENT
Start: 2020-01-22 | End: 2020-01-26

## 2020-01-22 RX ORDER — HYDROCODONE BITARTRATE AND ACETAMINOPHEN 5; 325 MG/1; MG/1
2 TABLET ORAL EVERY 4 HOURS PRN
Status: DISCONTINUED | OUTPATIENT
Start: 2020-01-22 | End: 2020-01-22 | Stop reason: HOSPADM

## 2020-01-22 RX ORDER — HYDROCODONE BITARTRATE AND ACETAMINOPHEN 5; 325 MG/1; MG/1
1 TABLET ORAL EVERY 4 HOURS PRN
Status: DISCONTINUED | OUTPATIENT
Start: 2020-01-22 | End: 2020-01-22 | Stop reason: HOSPADM

## 2020-01-22 RX ADMIN — DOCUSATE SODIUM 100 MG: 100 CAPSULE, LIQUID FILLED ORAL at 08:45

## 2020-01-22 RX ADMIN — HYDROCODONE BITARTRATE AND ACETAMINOPHEN 2 TABLET: 5; 325 TABLET ORAL at 08:44

## 2020-01-22 RX ADMIN — HYDROCODONE BITARTRATE AND ACETAMINOPHEN 2 TABLET: 5; 325 TABLET ORAL at 02:06

## 2020-01-22 ASSESSMENT — PAIN SCALES - GENERAL
PAINLEVEL_OUTOF10: 2
PAINLEVEL_OUTOF10: 7
PAINLEVEL_OUTOF10: 6
PAINLEVEL_OUTOF10: 3

## 2020-01-22 ASSESSMENT — PAIN DESCRIPTION - PAIN TYPE
TYPE: SURGICAL PAIN
TYPE: SURGICAL PAIN

## 2020-01-22 ASSESSMENT — PAIN DESCRIPTION - LOCATION: LOCATION: ABDOMEN

## 2020-01-22 NOTE — PLAN OF CARE
Problem: Pain:  Goal: Pain level will decrease  Outcome: Completed  Goal: Control of acute pain  Outcome: Completed  Goal: Control of chronic pain  Outcome: Completed     Problem: Discharge Planning:  Goal: Discharged to appropriate level of care  Outcome: Completed     Problem: Fluid Volume - Imbalance:  Goal: Absence of postpartum hemorrhage signs and symptoms  Outcome: Completed  Goal: Absence of imbalanced fluid volume signs and symptoms  Outcome: Completed     Problem: Infection - Surgical Site:  Goal: Will show no infection signs and symptoms  Outcome: Completed     Problem: Mood - Altered:  Goal: Mood stable  Outcome: Completed     Problem: Nausea/Vomiting:  Goal: Absence of nausea/vomiting  Outcome: Completed     Problem: Pain - Acute:  Goal: Pain level will decrease  Outcome: Completed     Problem: Urinary Retention:  Goal: Urinary elimination within specified parameters  Outcome: Completed     Problem: Venous Thromboembolism:  Goal: Will show no signs or symptoms of venous thromboembolism  Outcome: Completed  Goal: Absence of signs or symptoms of impaired coagulation  Outcome: Completed

## 2020-01-22 NOTE — PROGRESS NOTES
CLINICAL PHARMACY NOTE: MEDS TO [address] Select Patient?: No  Total # of Prescriptions Filled: 2   The following medications were delivered to the patient:  · Ibuprofen and norco  Total # of Interventions Completed: 2  Time Spent (min): 45    Additional Documentation:  Name was spelled wrong upon Eznirav Stewart is correct- stated by Harley Bustamante RN. Colace was otc and not covered under insurance-pt bought a bottle of 100 from us.   copay paid

## 2020-01-31 ENCOUNTER — OFFICE VISIT (OUTPATIENT)
Dept: OBGYN CLINIC | Age: 30
End: 2020-01-31

## 2020-01-31 VITALS
WEIGHT: 104 LBS | RESPIRATION RATE: 18 BRPM | DIASTOLIC BLOOD PRESSURE: 68 MMHG | SYSTOLIC BLOOD PRESSURE: 106 MMHG | HEIGHT: 63 IN | BODY MASS INDEX: 18.43 KG/M2

## 2020-01-31 PROBLEM — E03.8 TSH DEFICIENCY: Status: RESOLVED | Noted: 2019-07-10 | Resolved: 2020-01-31

## 2020-01-31 PROBLEM — O24.419 GESTATIONAL DIABETES MELLITUS (GDM), ANTEPARTUM: Status: RESOLVED | Noted: 2019-11-13 | Resolved: 2020-01-31

## 2020-01-31 PROCEDURE — 0503F POSTPARTUM CARE VISIT: CPT | Performed by: NURSE PRACTITIONER

## 2020-01-31 NOTE — PROGRESS NOTES
Ananias Denver  06:96 AM  98            The patient was seen. She has no chief complaints today. She delivered by  section on 2020. She is not breast feeding and there is not any signs or symptoms of mastitis. The patient completed the E.P.D.S. Evaluation form and scored 0. She does not have any signs or symptoms of post partum depression. She denies any suicidal thoughts with a plan, intent to harm others and delusional ideas. Today her lochia is light she denies any dizziness or shortness of breath. Her pregnancy was complicated by:   Patient Active Problem List    Diagnosis Date Noted    Postpartum state     RLTCS 20 M APG  Wt 4#6 2020    Poor fetal growth affecting management of mother in third trimester     Delivery with history of      Hx CS x1 2019    Abnormal urinalysis 2019     Overview Note:     19: UA showed large Hgb in urine, microscopic UA showed  RBC. Rx Keflex given to patient, will need repeat UA. UCx pending       Contact with hypodermic needle 2019    Pnctr w/o FB of r idx fngr w/o damage to nail, subs 2019    IBS (irritable bowel syndrome)          She does admit to having good home support. Her bowels are regular and she denies any urinary tract symptomology. OB History    Para Term  AB Living   3 2 2 0 1 2   SAB TAB Ectopic Molar Multiple Live Births   1 0 0 0 0 2           Blood pressure 106/68, resp. rate 18, height 5' 3\" (1.6 m), weight 104 lb (47.2 kg), last menstrual period 2019, not currently breastfeeding. Abdomen: Soft and non-tender; good bowel sounds; no guarding, rebound or rigidity; no CVA tenderness bilaterally. Incision: Clean, Dry and Intact without signs or symptoms of infection. Extremities: No calf tenderness bilaterally. DTR 2/4 bilaterally. No edema. Assessment:   Diagnosis Orders   1.  Postpartum care and examination       Chief

## 2020-02-26 ENCOUNTER — OFFICE VISIT (OUTPATIENT)
Dept: OBGYN CLINIC | Age: 30
End: 2020-02-26

## 2020-02-26 VITALS
HEIGHT: 62 IN | SYSTOLIC BLOOD PRESSURE: 110 MMHG | WEIGHT: 99 LBS | DIASTOLIC BLOOD PRESSURE: 70 MMHG | BODY MASS INDEX: 18.22 KG/M2 | HEART RATE: 80 BPM

## 2020-02-26 PROCEDURE — 0503F POSTPARTUM CARE VISIT: CPT | Performed by: OBSTETRICS & GYNECOLOGY

## 2020-02-26 RX ORDER — NORGESTIMATE AND ETHINYL ESTRADIOL 7DAYSX3 28
1 KIT ORAL DAILY
Qty: 28 TABLET | Refills: 11 | Status: SHIPPED | OUTPATIENT
Start: 2020-02-26 | End: 2021-04-21

## 2020-03-04 ENCOUNTER — HOSPITAL ENCOUNTER (OUTPATIENT)
Age: 30
Discharge: HOME | End: 2020-03-04
Payer: COMMERCIAL

## 2020-03-04 LAB
AMOUNT GLUCOSE GIVEN: 75 G
GLUCOSE FASTING: 108 MG/DL (ref 65–99)
GLUCOSE TOLERANCE TEST 1 HOUR: 163 MG/DL (ref 65–184)
GLUCOSE TOLERANCE TEST 2 HOUR: 107 MG/DL (ref 65–139)

## 2020-03-04 PROCEDURE — 82951 GLUCOSE TOLERANCE TEST (GTT): CPT

## 2020-03-04 PROCEDURE — 36415 COLL VENOUS BLD VENIPUNCTURE: CPT

## 2020-06-04 ENCOUNTER — HOSPITAL ENCOUNTER (OUTPATIENT)
Age: 30
Discharge: HOME | End: 2020-06-04
Payer: COMMERCIAL

## 2020-06-04 LAB — HCG QUANTITATIVE: <1 IU/L

## 2020-06-04 PROCEDURE — 36415 COLL VENOUS BLD VENIPUNCTURE: CPT

## 2020-06-04 PROCEDURE — 84702 CHORIONIC GONADOTROPIN TEST: CPT

## 2020-06-08 ENCOUNTER — HOSPITAL ENCOUNTER (OUTPATIENT)
Age: 30
Discharge: HOME | End: 2020-06-08
Payer: COMMERCIAL

## 2020-06-08 LAB — HCG QUANTITATIVE: <1 IU/L

## 2020-06-08 PROCEDURE — 84702 CHORIONIC GONADOTROPIN TEST: CPT

## 2020-06-08 PROCEDURE — 36415 COLL VENOUS BLD VENIPUNCTURE: CPT

## 2020-07-09 ENCOUNTER — OFFICE VISIT (OUTPATIENT)
Dept: PRIMARY CARE CLINIC | Age: 30
End: 2020-07-09
Payer: COMMERCIAL

## 2020-07-09 VITALS
BODY MASS INDEX: 17.82 KG/M2 | TEMPERATURE: 98.6 F | WEIGHT: 100.6 LBS | HEIGHT: 63 IN | RESPIRATION RATE: 16 BRPM | OXYGEN SATURATION: 98 % | DIASTOLIC BLOOD PRESSURE: 64 MMHG | SYSTOLIC BLOOD PRESSURE: 110 MMHG | HEART RATE: 76 BPM

## 2020-07-09 PROCEDURE — 99406 BEHAV CHNG SMOKING 3-10 MIN: CPT | Performed by: INTERNAL MEDICINE

## 2020-07-09 PROCEDURE — 99204 OFFICE O/P NEW MOD 45 MIN: CPT | Performed by: INTERNAL MEDICINE

## 2020-07-09 PROCEDURE — 90732 PPSV23 VACC 2 YRS+ SUBQ/IM: CPT | Performed by: INTERNAL MEDICINE

## 2020-07-09 PROCEDURE — 90471 IMMUNIZATION ADMIN: CPT | Performed by: INTERNAL MEDICINE

## 2020-07-09 RX ORDER — BUPROPION HYDROCHLORIDE 150 MG/1
150 TABLET ORAL EVERY MORNING
Qty: 90 TABLET | Refills: 1 | Status: SHIPPED | OUTPATIENT
Start: 2020-07-09 | End: 2021-06-04

## 2020-07-09 RX ORDER — PANTOPRAZOLE SODIUM 40 MG/1
40 TABLET, DELAYED RELEASE ORAL DAILY
Qty: 60 TABLET | Refills: 1 | Status: SHIPPED | OUTPATIENT
Start: 2020-07-09 | End: 2021-06-04

## 2020-07-09 ASSESSMENT — PATIENT HEALTH QUESTIONNAIRE - PHQ9
SUM OF ALL RESPONSES TO PHQ QUESTIONS 1-9: 0
2. FEELING DOWN, DEPRESSED OR HOPELESS: 0
SUM OF ALL RESPONSES TO PHQ9 QUESTIONS 1 & 2: 0
SUM OF ALL RESPONSES TO PHQ QUESTIONS 1-9: 0
1. LITTLE INTEREST OR PLEASURE IN DOING THINGS: 0

## 2020-07-14 ENCOUNTER — TELEPHONE (OUTPATIENT)
Dept: PRIMARY CARE CLINIC | Age: 30
End: 2020-07-14

## 2020-07-22 ASSESSMENT — ENCOUNTER SYMPTOMS
BACK PAIN: 0
ABDOMINAL PAIN: 0
SINUS PAIN: 0
DIARRHEA: 0
COUGH: 0
SHORTNESS OF BREATH: 0
NAUSEA: 0
VOMITING: 0
WHEEZING: 0
ABDOMINAL DISTENTION: 0
CONSTIPATION: 0
SINUS PRESSURE: 0

## 2020-07-23 NOTE — PROGRESS NOTES
[address] [facility] PRIMARY CARE  [phone] [address]  Dept: [phone]  Dept Fax: [phone]    Ubaldo Machuca is a 27 y.o. female who presents today for her medical conditions/complaints as noted below. Chief Complaint   [facility]   [facility]     Establish Care. Pt c/o nausea x 3 weeks. HPI:     This is a 22-year-old female who is here for establishing care. She has past medical history of gestational diabetes when she was pregnant otherwise no other complaints or concerns. She is currently on birth control pills. Next  She has acid reflux and discussed about starting her on pantoprazole. Next  She is also current smoker and discuss about Wellbutrin. She is open and trying the same. Vitals are normal.  Advised to cut down on smoking. No other complaints or concerns.       Hemoglobin A1C (%)   Date Value   2019 4.9             ( goal A1C is < 7)   No results found for: LABMICR  No results found for: LDLCHOLESTEROL, LDLCALC    (goal LDL is <100)   AST (U/L)   Date Value   2019 17     ALT (U/L)   Date Value   2019 12     BUN (mg/dL)   Date Value   2019 6     BP Readings from Last 3 Encounters:   20 110/64   20 110/70   20 106/68          (goal 120/80)    Past Medical History:   Diagnosis Date    Depression     Gestational diabetes mellitus (GDM), antepartum 2019    IBS (irritable bowel syndrome)     Mental disorder     BIPOLAR    Pnctr w/o FB of r idx fngr w/o damage to nail, subs 2019    TSH deficiency 7/10/2019      Past Surgical History:   Procedure Laterality Date     SECTION       SECTION N/A 2020     SECTION performed by Johnson Connelly DO at [facility] OR       Family History   Problem Relation Age of Onset    Hypertension Father     Arthritis Mother         FIBROMYALGIA, BLOOD TRANSFUSIONS, VARICOSE VEINS    Pancreatic Cancer Paternal Grandfather     Lung Cancer Paternal Grandfather        Social History     Tobacco Use    Smoking status: Current Every Day Smoker     Packs/day: 0.25     Years: 13.00     Pack years: 3.25     Types: Cigarettes     Start date: 6/11/2002    Smokeless tobacco: Never Used    Tobacco comment: \"4cigs/day\" 1/14/2020   Substance Use Topics    Alcohol use: Not Currently     Comment: occasionally      Current Outpatient Medications   Medication Sig Dispense Refill    pantoprazole (PROTONIX) 40 MG tablet Take 1 tablet by mouth daily 60 tablet 1    buPROPion (WELLBUTRIN XL) 150 MG extended release tablet Take 1 tablet by mouth every morning 90 tablet 1    Norgestim-Eth Estrad Triphasic (TRI-SPRINTEC) 0.18/0.215/0.25 MG-35 MCG TABS Take 1 tablet by mouth daily 28 tablet 11     No current facility-administered medications for this visit. Allergies   Allergen Reactions    Arestin [Minocycline] Hives       Health Maintenance   Topic Date Due    Varicella vaccine (1 of 2 - 2-dose childhood series) 03/14/1991    Cervical cancer screen  08/14/2020    Flu vaccine (1) 09/01/2020    DTaP/Tdap/Td vaccine (2 - Td) 11/07/2029    Pneumococcal 0-64 years Vaccine  Completed    HIV screen  Completed    Hepatitis A vaccine  Aged Out    Hepatitis B vaccine  Aged Out    Hib vaccine  Aged Out    Meningococcal (ACWY) vaccine  Aged Out       Subjective:      Review of Systems   Constitutional: Negative for activity change, appetite change, chills, fatigue and fever. HENT: Negative for congestion, ear pain, hearing loss, nosebleeds, sinus pressure, sinus pain and sneezing. Eyes: Negative for visual disturbance. Respiratory: Negative for cough, shortness of breath and wheezing. Cardiovascular: Negative for chest pain and palpitations. Gastrointestinal: Negative for abdominal distention, abdominal pain, constipation, diarrhea, nausea and vomiting.    Endocrine: Negative for cold intolerance, heat intolerance, polydipsia, polyphagia and polyuria. Genitourinary: Negative for difficulty urinating, menstrual problem, vaginal bleeding and vaginal discharge. Musculoskeletal: Negative for back pain and joint swelling. Skin: Negative for rash. Neurological: Negative for numbness and headaches. Psychiatric/Behavioral: Negative for sleep disturbance. The patient is not nervous/anxious. All other systems reviewed and are negative. Objective:     Physical Exam  Vitals signs and nursing note reviewed. Constitutional:       General: She is not in acute distress. Appearance: She is well-developed. She is not diaphoretic. HENT:      Head: Normocephalic and atraumatic. Right Ear: Hearing normal.      Left Ear: Hearing normal.      Mouth/Throat:      Pharynx: Uvula midline. Eyes:      General: No scleral icterus. Conjunctiva/sclera: Conjunctivae normal.      Pupils: Pupils are equal, round, and reactive to light. Neck:      Musculoskeletal: Full passive range of motion without pain, normal range of motion and neck supple. Thyroid: No thyroid mass or thyromegaly. Vascular: No JVD. Trachea: Phonation normal.   Cardiovascular:      Rate and Rhythm: Normal rate and regular rhythm. Pulses: No decreased pulses. Carotid pulses are 2+ on the right side and 2+ on the left side. Radial pulses are 2+ on the right side and 2+ on the left side. Heart sounds: Normal heart sounds. No murmur. Pulmonary:      Effort: Pulmonary effort is normal. No accessory muscle usage or respiratory distress. Breath sounds: Normal breath sounds. No wheezing or rales. Abdominal:      General: Bowel sounds are normal. There is no distension. Palpations: Abdomen is soft. Tenderness: There is no abdominal tenderness. Musculoskeletal: Normal range of motion. General: No deformity. Lymphadenopathy:      Cervical: No cervical adenopathy.    Skin: General: Skin is warm. Capillary Refill: Capillary refill takes less than 2 seconds. Findings: No rash. Neurological:      Mental Status: She is alert and oriented to person, place, and time. Deep Tendon Reflexes: Reflexes normal.   Psychiatric:         Behavior: Behavior normal.       /64   Pulse 76   Temp 98.6 °F (37 °C) (Temporal)   Resp 16   Ht 5' 2.75\" (1.594 m)   Wt 100 lb 9.6 oz (45.6 kg)   SpO2 98%   BMI 17.96 kg/m²     Assessment:          1. Encounter to establish care with new doctor      2. History of gestational diabetes mellitus (GDM)    - Hemoglobin A1C; Future    3. Functional dyspepsia    - pantoprazole (PROTONIX) 40 MG tablet; Take 1 tablet by mouth daily  Dispense: 60 tablet; Refill: 1    4. Smoker    - buPROPion (WELLBUTRIN XL) 150 MG extended release tablet; Take 1 tablet by mouth every morning  Dispense: 90 tablet; Refill: 1    5. Need for pneumococcal vaccination    - PNEUMOVAX 23 subcutaneous/IM (Pneumococcal polysaccharide vaccine 23-valent >= 3yo)            Diagnosis Orders   1. Encounter to establish care with new doctor     2. History of gestational diabetes mellitus (GDM)  Hemoglobin A1C   3. Functional dyspepsia  pantoprazole (PROTONIX) 40 MG tablet   4. Smoker  buPROPion (WELLBUTRIN XL) 150 MG extended release tablet   5. Need for pneumococcal vaccination  PNEUMOVAX 23 subcutaneous/IM (Pneumococcal polysaccharide vaccine 23-valent >= 3yo)           Plan:      Return in about 3 months (around 10/9/2020) for Routine follow-up.     Orders Placed This Encounter   Procedures    PNEUMOVAX 23 subcutaneous/IM (Pneumococcal polysaccharide vaccine 23-valent >= 3yo)    Hemoglobin A1C     Standing Status:   Future     Standing Expiration Date:   7/9/2021     Orders Placed This Encounter   Medications    pantoprazole (PROTONIX) 40 MG tablet     Sig: Take 1 tablet by mouth daily     Dispense:  60 tablet     Refill:  1    buPROPion (WELLBUTRIN XL) 150 MG extended release tablet     Sig: Take 1 tablet by mouth every morning     Dispense:  90 tablet     Refill:  1         Patient given educational materials - see patient instructions. Discussed use, benefit, and side effects of prescribedmedications. All patient questions answered. Pt voiced understanding. Reviewed health maintenance. Instructed to continue current medications, diet and exercise. Patient agreed with treatment plan. Follow up as directed. I spent a total of 45 minutes face to face with this patient. Over 50% of that time was spent on counseling and care coordination. Please see assessment and plan for details. Electronically signed by Lilia Rose MD on 7/22/2020 at 11:10 PM      Please note that this chart was generated using voice recognition Dragon dictation software. Although every effort was made to ensure the accuracy of this automatedtranscription, some errors in transcription may have occurred.

## 2020-08-17 ENCOUNTER — TELEPHONE (OUTPATIENT)
Dept: PRIMARY CARE CLINIC | Age: 30
End: 2020-08-17

## 2020-08-17 RX ORDER — PREDNISONE 20 MG/1
TABLET ORAL
Qty: 18 TABLET | Refills: 0 | Status: SHIPPED | OUTPATIENT
Start: 2020-08-17 | End: 2020-08-27

## 2020-08-17 NOTE — TELEPHONE ENCOUNTER
Pt states that she came into contact with poison ivy on Saturday 08/15/20. Pt states that she has rashes on both arms and into armpit, rt leg behind knee into rt thigh, and on her rt side of her neck. Pt has used calamine lotion and benadryl cream with minimal relief. Pt son not know if she should be seen in office or do VV Mychart Visit, or if PCP will send medication into [facility] in [place]. Please advise.

## 2020-10-08 ENCOUNTER — OFFICE VISIT (OUTPATIENT)
Dept: PRIMARY CARE CLINIC | Age: 30
End: 2020-10-08
Payer: COMMERCIAL

## 2020-10-08 VITALS
HEART RATE: 84 BPM | WEIGHT: 110 LBS | RESPIRATION RATE: 16 BRPM | OXYGEN SATURATION: 98 % | SYSTOLIC BLOOD PRESSURE: 108 MMHG | DIASTOLIC BLOOD PRESSURE: 62 MMHG | BODY MASS INDEX: 19.64 KG/M2

## 2020-10-08 PROBLEM — F41.9 ANXIETY AND DEPRESSION: Status: ACTIVE | Noted: 2020-10-08

## 2020-10-08 PROBLEM — F17.200 SMOKER: Status: ACTIVE | Noted: 2020-10-08

## 2020-10-08 PROBLEM — F32.A ANXIETY AND DEPRESSION: Status: ACTIVE | Noted: 2020-10-08

## 2020-10-08 PROBLEM — K21.9 GASTROESOPHAGEAL REFLUX DISEASE: Status: ACTIVE | Noted: 2020-10-08

## 2020-10-08 PROCEDURE — 90686 IIV4 VACC NO PRSV 0.5 ML IM: CPT | Performed by: INTERNAL MEDICINE

## 2020-10-08 PROCEDURE — 90471 IMMUNIZATION ADMIN: CPT | Performed by: INTERNAL MEDICINE

## 2020-10-08 PROCEDURE — 99395 PREV VISIT EST AGE 18-39: CPT | Performed by: INTERNAL MEDICINE

## 2020-10-08 RX ORDER — PREDNISONE 1 MG/1
TABLET ORAL
COMMUNITY
Start: 2020-10-07 | End: 2020-10-20 | Stop reason: ALTCHOICE

## 2020-10-08 RX ORDER — IBUPROFEN 800 MG/1
TABLET ORAL
COMMUNITY
Start: 2020-10-07 | End: 2021-04-21 | Stop reason: ALTCHOICE

## 2020-10-08 NOTE — PROGRESS NOTES
Is the person to be vaccinated sick today? no    Does the person to be vaccinated have an allergy to eggs or to a component of the vaccine? No    Has the person to be vaccinated ever had a serious reaction to influenza vaccine in the past? No    Has the person to be vaccinated ever had Guillan-Wendell' Syndrome? No      After obtaining consent, and per orders of Dr. Beatriz Sanders, injection of Influenza Vaccine given in Right deltoid by Ena Eric. Patient instructed to remain in clinic for 20 minutes afterwards, and to report any adverse reaction to me immediately.

## 2020-10-15 ASSESSMENT — ENCOUNTER SYMPTOMS
WHEEZING: 0
VOMITING: 0
NAUSEA: 0
DIARRHEA: 0
COUGH: 0
SINUS PRESSURE: 0
CONSTIPATION: 0
SHORTNESS OF BREATH: 0
SINUS PAIN: 0
ABDOMINAL DISTENTION: 0
ABDOMINAL PAIN: 0
BACK PAIN: 0

## 2020-10-15 NOTE — PROGRESS NOTES
[address] [place] [address] PRIMARY CARE  . [address]  [address] [address]  Dept: [phone]  Dept Fax: [phone]    Cara Chavira is a 27 y.o. female who presents today for her medical conditions/complaints as noted below. Chief Complaint   Patient presents with    Follow-up       HPI:     This is a 20-year-old female who is here for annual physical.  She is due for blood work. She has past medical history of GERD, anxiety and depression, history of gestational diabetes. Next  Reviewed all the meds and updated the list.  No other complaints or concerns.       Hemoglobin A1C (%)   Date Value   2019 4.9             ( goal A1C is < 7)   No results found for: LABMICR  No results found for: LDLCHOLESTEROL, LDLCALC    (goal LDL is <100)   AST (U/L)   Date Value   2019 17     ALT (U/L)   Date Value   2019 12     BUN (mg/dL)   Date Value   2019 6     BP Readings from Last 3 Encounters:   10/08/20 108/62   20 110/64   20 110/70          (goal 120/80)    Past Medical History:   Diagnosis Date    Anxiety and depression 10/8/2020    Depression     Gastroesophageal reflux disease 10/8/2020    Gestational diabetes mellitus (GDM), antepartum 2019    IBS (irritable bowel syndrome)     Mental disorder     BIPOLAR    Pnctr w/o FB of r idx fngr w/o damage to nail, subs 2019    TSH deficiency 7/10/2019      Past Surgical History:   Procedure Laterality Date     SECTION       SECTION N/A 2020     SECTION performed by Elayne Gaytan DO at [facility]       Family History   Problem Relation Age of Onset    Hypertension Father    Rey Loser Arthritis Mother         FIBROMYALGIA, BLOOD TRANSFUSIONS, VARICOSE VEINS    Pancreatic Cancer Paternal Grandfather     Lung Cancer Paternal Grandfather        Social History     Tobacco Use    Smoking status: Current Every Day Smoker     Packs/day: 0.25 Years: 13.00     Pack years: 3.25     Types: Cigarettes     Start date: 6/11/2002    Smokeless tobacco: Never Used    Tobacco comment: \"4cigs/day\" 1/14/2020   Substance Use Topics    Alcohol use: Not Currently     Comment: occasionally      Current Outpatient Medications   Medication Sig Dispense Refill    predniSONE (DELTASONE) 5 MG tablet       ibuprofen (ADVIL;MOTRIN) 800 MG tablet       pantoprazole (PROTONIX) 40 MG tablet Take 1 tablet by mouth daily 60 tablet 1    buPROPion (WELLBUTRIN XL) 150 MG extended release tablet Take 1 tablet by mouth every morning 90 tablet 1    Norgestim-Eth Estrad Triphasic (TRI-SPRINTEC) 0.18/0.215/0.25 MG-35 MCG TABS Take 1 tablet by mouth daily 28 tablet 11     No current facility-administered medications for this visit. Allergies   Allergen Reactions    Arestin [Minocycline] Hives       Health Maintenance   Topic Date Due    Varicella vaccine (1 of 2 - 2-dose childhood series) 03/14/1991    Cervical cancer screen  08/14/2020    DTaP/Tdap/Td vaccine (2 - Td) 11/07/2029    Flu vaccine  Completed    Pneumococcal 0-64 years Vaccine  Completed    HIV screen  Completed    Hepatitis A vaccine  Aged Out    Hepatitis B vaccine  Aged Out    Hib vaccine  Aged Out    Meningococcal (ACWY) vaccine  Aged Out       Subjective:      Review of Systems   Constitutional: Negative for activity change, appetite change, chills, fatigue and fever. HENT: Negative for congestion, ear pain, hearing loss, nosebleeds, sinus pressure, sinus pain and sneezing. Eyes: Negative for visual disturbance. Respiratory: Negative for cough, shortness of breath and wheezing. Cardiovascular: Negative for chest pain and palpitations. Gastrointestinal: Negative for abdominal distention, abdominal pain, constipation, diarrhea, nausea and vomiting. Endocrine: Negative for cold intolerance, heat intolerance, polydipsia, polyphagia and polyuria.    Genitourinary: Negative for difficulty urinating, menstrual problem, vaginal bleeding and vaginal discharge. Musculoskeletal: Negative for back pain and joint swelling. Skin: Negative for rash. Neurological: Negative for numbness and headaches. Psychiatric/Behavioral: Negative for sleep disturbance. The patient is not nervous/anxious. All other systems reviewed and are negative. Objective:     Physical Exam  Vitals signs and nursing note reviewed. Constitutional:       General: She is not in acute distress. Appearance: She is well-developed. She is not diaphoretic. HENT:      Head: Normocephalic and atraumatic. Right Ear: Hearing normal.      Left Ear: Hearing normal.      Mouth/Throat:      Pharynx: Uvula midline. Eyes:      General: No scleral icterus. Conjunctiva/sclera: Conjunctivae normal.      Pupils: Pupils are equal, round, and reactive to light. Neck:      Musculoskeletal: Full passive range of motion without pain, normal range of motion and neck supple. Thyroid: No thyroid mass or thyromegaly. Vascular: No JVD. Trachea: Phonation normal.   Cardiovascular:      Rate and Rhythm: Normal rate and regular rhythm. Pulses: No decreased pulses. Carotid pulses are 2+ on the right side and 2+ on the left side. Radial pulses are 2+ on the right side and 2+ on the left side. Heart sounds: Normal heart sounds. No murmur. Pulmonary:      Effort: Pulmonary effort is normal. No accessory muscle usage or respiratory distress. Breath sounds: Normal breath sounds. No wheezing or rales. Abdominal:      General: Bowel sounds are normal. There is no distension. Palpations: Abdomen is soft. Tenderness: There is no abdominal tenderness. Musculoskeletal: Normal range of motion. General: No deformity. Lymphadenopathy:      Cervical: No cervical adenopathy. Skin:     General: Skin is warm. Capillary Refill: Capillary refill takes less than 2 seconds. Findings: No rash. Neurological:      Mental Status: She is alert and oriented to person, place, and time. Deep Tendon Reflexes: Reflexes normal.   Psychiatric:         Behavior: Behavior normal.       /62   Pulse 84   Resp 16   Wt 110 lb (49.9 kg)   SpO2 98%   BMI 19.64 kg/m²     Assessment:          1. Annual physical exam    - CBC Auto Differential; Future  - TSH with Reflex; Future  - Lipid Panel; Future  - Comprehensive Metabolic Panel; Future  - Vitamin B12 & Folate; Future  - Vitamin D 25 Hydroxy; Future    2. Anxiety and depression  Wellbutrin    3. History of gestational diabetes mellitus (GDM)      4. Smoker   Advised to quit smoking    5. Gastroesophageal reflux disease, unspecified whether esophagitis present      6. Need for influenza vaccination    - INFLUENZA, QUADV, 3 YRS AND OLDER, IM PF, PREFILL SYR OR SDV, 0.5ML (AFLURIA QUADV, PF)            Diagnosis Orders   1. Annual physical exam  CBC Auto Differential    TSH with Reflex    Lipid Panel    Comprehensive Metabolic Panel    Vitamin B12 & Folate    Vitamin D 25 Hydroxy   2. Anxiety and depression     3. History of gestational diabetes mellitus (GDM)     4. Smoker     5. Gastroesophageal reflux disease, unspecified whether esophagitis present     6. Need for influenza vaccination  INFLUENZA, QUADV, 3 YRS AND OLDER, IM PF, PREFILL SYR OR SDV, 0.5ML (AFLURIA QUADV, PF)           Plan:      Return in about 6 months (around 4/8/2021) for Routine follow-up.     Orders Placed This Encounter   Procedures    INFLUENZA, QUADV, 3 YRS AND OLDER, IM PF, PREFILL SYR OR SDV, 0.5ML (AFLURIA QUADV, PF)    CBC Auto Differential     Standing Status:   Future     Standing Expiration Date:   10/8/2021    TSH with Reflex     Standing Status:   Future     Standing Expiration Date:   10/8/2021    Lipid Panel     Standing Status:   Future     Standing Expiration Date:   1/8/2021     Order Specific Question:   Is Patient Fasting?/# of Hours Answer: Fast 8-10 hours    Comprehensive Metabolic Panel     Standing Status:   Future     Standing Expiration Date:   10/8/2021    Vitamin B12 & Folate     Standing Status:   Future     Standing Expiration Date:   10/8/2021    Vitamin D 25 Hydroxy     Standing Status:   Future     Standing Expiration Date:   10/8/2021     No orders of the defined types were placed in this encounter. Patient given educational materials - see patient instructions. Discussed use, benefit, and side effects of prescribedmedications. All patient questions answered. Pt voiced understanding. Reviewed health maintenance. Instructed to continue current medications, diet and exercise. Patient agreed with treatment plan. Follow up as directed. I spent a total of 25 minutes face to face with this patient. Over 50% of that time was spent on counseling and care coordination. Please see assessment and plan for details. Electronically signed by Jan Arevalo MD on 10/15/2020 at 12:42 AM      Please note that this chart was generated using voice recognition Dragon dictation software. Although every effort was made to ensure the accuracy of this automatedtranscription, some errors in transcription may have occurred.

## 2020-10-20 ENCOUNTER — HOSPITAL ENCOUNTER (OUTPATIENT)
Age: 30
Setting detail: SPECIMEN
Discharge: HOME | End: 2020-10-20
Payer: COMMERCIAL

## 2020-10-20 ENCOUNTER — OFFICE VISIT (OUTPATIENT)
Dept: OBGYN CLINIC | Age: 30
End: 2020-10-20
Payer: COMMERCIAL

## 2020-10-20 VITALS
TEMPERATURE: 97.2 F | HEART RATE: 85 BPM | DIASTOLIC BLOOD PRESSURE: 72 MMHG | SYSTOLIC BLOOD PRESSURE: 110 MMHG | HEIGHT: 62 IN | BODY MASS INDEX: 20.43 KG/M2 | WEIGHT: 111 LBS

## 2020-10-20 PROCEDURE — 99395 PREV VISIT EST AGE 18-39: CPT | Performed by: NURSE PRACTITIONER

## 2020-10-20 PROCEDURE — 87624 HPV HI-RISK TYP POOLED RSLT: CPT

## 2020-10-20 PROCEDURE — G0145 SCR C/V CYTO,THINLAYER,RESCR: HCPCS

## 2020-10-20 ASSESSMENT — PATIENT HEALTH QUESTIONNAIRE - PHQ9
1. LITTLE INTEREST OR PLEASURE IN DOING THINGS: 0
SUM OF ALL RESPONSES TO PHQ QUESTIONS 1-9: 0
SUM OF ALL RESPONSES TO PHQ QUESTIONS 1-9: 0
2. FEELING DOWN, DEPRESSED OR HOPELESS: 0
SUM OF ALL RESPONSES TO PHQ QUESTIONS 1-9: 0
SUM OF ALL RESPONSES TO PHQ9 QUESTIONS 1 & 2: 0

## 2020-10-20 NOTE — PROGRESS NOTES
History and Physical  [address], [address] [facility] [address], [address] [facility] [phone]   Fax [phone]  Ernesto Epps                27 y.o. Chief Complaint   Patient presents with    Gynecologic Exam     wants to talk about having another baby but she had a c/s so she wanted info about that; has been very emotional lately       Patient's last menstrual period was 2020. Primary Care Physician: Veronica Juarez MD    The patient was seen and examined. She has no chief complaint today and is here for her annual exam.  Her bowels are regular. There are no voiding complaints. She denies any bloating. She denies vaginal discharge and was counseled on STD's and the need for barrier contraception. HPI : Ernesto Epps is a 27 y.o. female [id number]    Annual exam  Desires to continue on OCPs  Thinking about trying for a pregnancy in another 3 months. Declines prescription for prenatal vitamin.   Taking OTC.  ________________________________________________________________________  OB History    Para Term  AB Living   3 2 2 0 1 2   SAB TAB Ectopic Molar Multiple Live Births   1 0 0 0 0 2      # Outcome Date GA Lbr Melvin/2nd Weight Sex Delivery Anes PTL Lv   3 Term 20 38w0d  4 lb 6.1 oz (1.986 kg) M CS-LTranv Spinal N MANDY      Complications: IUGR (intrauterine growth restriction) affecting care of mother      Name: Ashley Cluster: 9  Apgar5: 9   2 SAB            1 Term 09 38w0d  5 lb 12 oz (2.608 kg) M CS-Unspec   MANDY      Name: Yelena Riveran: 5  Apgar5: 9     Past Medical History:   Diagnosis Date    Anxiety and depression 10/8/2020    Depression     Gastroesophageal reflux disease 10/8/2020    Gestational diabetes mellitus (GDM), antepartum 2019    IBS (irritable bowel syndrome)     Mental disorder     BIPOLAR    Pnctr w/o FB of r idx fngr w/o damage to nail, subs 2019    TSH deficiency 7/10/2019                                                                   Past Surgical History:   Procedure Laterality Date     SECTION       SECTION N/A 2020     SECTION performed by Marc Reina DO at [facility]     Family History   Problem Relation Age of Onset    Hypertension Father     Arthritis Mother         FIBROMYALGIA, BLOOD TRANSFUSIONS, VARICOSE VEINS    Pancreatic Cancer Paternal Grandfather     Lung Cancer Paternal Grandfather      Social History     Socioeconomic History    Marital status:      Spouse name: Not on file    Number of children: Not on file    Years of education: Not on file    Highest education level: Not on file   Occupational History    Not on file   Social Needs    Financial resource strain: Not on file    Food insecurity     Worry: Not on file     Inability: Not on file    Transportation needs     Medical: Not on file     Non-medical: Not on file   Tobacco Use    Smoking status: Current Every Day Smoker     Packs/day: 0.25     Years: 13.00     Pack years: 3.25     Types: Cigarettes     Start date: 2002    Smokeless tobacco: Never Used    Tobacco comment: \"4cigs/day\" 2020   Substance and Sexual Activity    Alcohol use: Not Currently     Comment: occasionally    Drug use: Yes     Frequency: 3.0 times per week     Types: Marijuana     Comment: \"3x's/week\" 2020    Sexual activity: Yes     Partners: Male   Lifestyle    Physical activity     Days per week: Not on file     Minutes per session: Not on file    Stress: Not on file   Relationships    Social connections     Talks on phone: Not on file     Gets together: Not on file     Attends Yazidism service: Not on file     Active member of club or organization: Not on file     Attends meetings of clubs or organizations: Not on file     Relationship status: Not on file    Intimate partner violence     Fear of current or ex partner: Not on file     Emotionally abused: Not on file     Physically abused: Not on file     Forced sexual activity: Not on file   Other Topics Concern    Not on file   Social History Narrative    Not on file       MEDICATIONS:  Current Outpatient Medications   Medication Sig Dispense Refill    ibuprofen (ADVIL;MOTRIN) 800 MG tablet       pantoprazole (PROTONIX) 40 MG tablet Take 1 tablet by mouth daily 60 tablet 1    buPROPion (WELLBUTRIN XL) 150 MG extended release tablet Take 1 tablet by mouth every morning 90 tablet 1    Norgestim-Eth Estrad Triphasic (TRI-SPRINTEC) 0.18/0.215/0.25 MG-35 MCG TABS Take 1 tablet by mouth daily 28 tablet 11     No current facility-administered medications for this visit. ALLERGIES:  Allergies as of 10/20/2020 - Review Complete 10/20/2020   Allergen Reaction Noted    Arestin [minocycline] Hives 06/11/2019       Symptoms of decreased mood absent  Symptoms of anhedonia absent    **If either question is answered in a  positive fashion then complete the PHQ9 Scoring Evaluation and make the appropriate referral**      Immunization status: stated as current, but no records available. Gynecologic History:  Menarche: 15 yo  Menopause at [place] yo     Patient's last menstrual period was 09/27/2020. Sexually Active: Yes    STD History: No     Permanent Sterilization: No   Reversible Birth Control: Yes ocps        Hormone Replacement Exposure: No      Genetic Qualified Family History of Breast, Ovarian , Colon or Uterine Cancer: No     If YES see scanned worksheet.     Preventative Health Testing:    Health Maintenance:  Health Maintenance Due   Topic Date Due    Varicella vaccine (1 of 2 - 2-dose childhood series) 03/14/1991    Cervical cancer screen  08/14/2020       Date of Last Pap Smear: 8/14/2019 neg  Abnormal Pap Smear History: denies  Colposcopy History:   Date of Last Mammogram: NA  Date of Last Colonoscopy:   Date of Last Bone Density:      ________________________________________________________________________        REVIEW OF SYSTEMS:    yes   A minimum of an eleven point review of systems was completed. Review Of Systems (11 point):  Constitutional: No fever, chills or malaise; No weight change or fatigue  Head and Eyes: No vision, Headache, Dizziness or trauma in last 12 months  ENT ROS: No hearing, Tinnitis, sinus or taste problems  Hematological and Lymphatic ROS:No Lymphoma, Von Willebrand's, Hemophillia or Bleeding History  Psych ROS: No Depression, Homicidal thoughts,suicidal thoughts, or anxiety. + bipolar  Breast ROS: No prior breast abnormalities or lumps  Respiratory ROS: No SOB, Pneumoniae,Cough, or Pulmonary Embolism History  Cardiovascular ROS: No Chest Pain with Exertion, Palpitations, Syncope, Edema, Arrhythmia  Gastrointestinal ROS: No Indigestion, Heartburn, Nausea, vomiting, Diarrhea, Constipation,or Bowel Changes; No Bloody Stools or melena  Genito-Urinary ROS: No Dysuria, Hematuria or Nocturia. No Urinary Incontinence or Vaginal Discharge  Musculoskeletal ROS: No Arthralgia, Arthritis,Gout,Osteoporosis or Rheumatism  Neurological ROS: No CVA, Migraines, Epilepsy, Seizure Hx, or Limb Weakness  Dermatological ROS: No Rash, Itching, Hives, Mole Changes or Cancer                                                                                                                                                                                                                                  PHYSICAL Exam:     Constitutional:  Vitals:    10/20/20 1133   BP: 110/72   Pulse: 85   Temp: 97.2 °F (36.2 °C)   TempSrc: Infrared   Weight: 111 lb (50.3 kg)   Height: 5' 2\" (1.575 m)         General Appearance: This  is a well Developed, well Nourished, well groomed female. Her BMI was reviewed. Nutritional decision making was discussed. Skin:  There was a Normal Inspection of the skin without rashes or lesions.   There were no rashes. (Papular, Maculopapular, Hives, Pustular, Macular)     There were no lesions (Ulcers, Erythema, Abn. Appearing Nevi)            Lymphatic:  No Lymph Nodes were Palpable in the neck , axilla or groin.  0 # Of Lymph Nodes; Location ; Character [Normal]  [Shotty] [Tender] [Enlarged]     Neck and EENT:  The neck was supple. There were no masses   The thyroid was not enlarged and had no masses. Perrla, EOMI B/L, TMI B/L No Abnormalities. Throat inspected-No exudates or Masses, Nares Patent No Masses        Respiratory: The lungs were auscultated and found to be clear. There were no rales, rhonchi or wheezes. There was a good respiratory effort. Cardiovascular: The heart was in a regular rate and rhythm. . No S3 or S4. There was no murmur appreciated. Location, grade, and radiation are not applicable. Extremities: The patients extremities were without calf tenderness, edema, or varicosities. There was full range of motion in all four extremities. Pulses in all four extremities were appreciated and are 2/4. Abdomen: The abdomen was soft and non-tender. There were good bowel sounds in all quadrants and there was no guarding, rebound or rigidity. On evaluation there was no evidence of hepatosplenomegaly and there was no costal vertebral oliver tenderness bilaterally. No hernias were appreciated. Abdominal Scars: C/S scar    Psych: The patient had a normal Orientation to: Time, Place, Person, and Situation  There is no Mood / Affect changes    Breast:  (Chest)  normal appearance, no masses or tenderness  Self breast exams were reviewed in detail. Literature was given. Pelvic Exam:  Vulva and vagina appear normal. Bimanual exam reveals normal uterus and adnexa. Rectal Exam:  exam declined by patient          Musculosk:  Normal Gait and station was noted. Digits were evaluated without abnormal findings.   Range of motion, stability and strength were evaluated and found to be appropriate for the patients age. ASSESSMENT:      27 y.o. Annual   Diagnosis Orders   1. Pap smear, as part of routine gynecological examination  PAP SMEAR   2. Special screening examination for human papillomavirus (HPV)  PAP SMEAR          Chief Complaint   Patient presents with    Gynecologic Exam     wants to talk about having another baby but she had a c/s so she wanted info about that; has been very emotional lately          Past Medical History:   Diagnosis Date    Anxiety and depression 10/8/2020    Depression     Gastroesophageal reflux disease 10/8/2020    Gestational diabetes mellitus (GDM), antepartum 2019    IBS (irritable bowel syndrome)     Mental disorder     BIPOLAR    Pnctr w/o FB of r idx fngr w/o damage to nail, subs 2019    TSH deficiency 7/10/2019         Patient Active Problem List    Diagnosis Date Noted    Anxiety and depression 10/08/2020    Gastroesophageal reflux disease 10/08/2020    Smoker 10/08/2020    Postpartum state     RLTCS 20 M APG 9/ Wt 4#6 2020    Poor fetal growth affecting management of mother in third trimester     Delivery with history of      Hx CS x1 2019    Abnormal urinalysis 2019: UA showed large Hgb in urine, microscopic UA showed  RBC. Rx Keflex given to patient, will need repeat UA. UCx pending       Contact with hypodermic needle 2019    Pnctr w/o FB of r idx fngr w/o damage to nail, subs 2019    IBS (irritable bowel syndrome)           Hereditary Breast, Ovarian, Colon and Uterine Cancer screening Done. Tobacco & Secondary smoke risks reviewed; instructed on cessation and avoidance      Counseling Completed:  Preventative Health Recommendations and Follow up. The patient was informed of the recommended preventative health recommendations. 1. Annuals every year; Cytology collections per prevailing guidelines.    2. Mammograms begin every year at 35 yo if no abnormalities are found and no family history. 3. Bone density studies every 2-3 years. Begin at 73 yo. If no fracture history or osteoporosis family history. (significant). 4. Colonoscopy begin at 40 yo. Repeat every ten years if negative and no family history. 5. Calcium of 3302-7697 mg/day in split dosing  6. Vitamin D 400-800 IU/day  7. All other preventative health recommendations will be managed by the patients Primary care physician. Counseling Hormonal Based Birth Control:      The patient was seen and counseled on all forms of birth control both male and female  reversible and non. She is aware that hormonal based birth control may increase her risk of developing a blood clot which may increase her morbidity and or mortality. She was counseled on alternate non hormonal based contraception options. We discussed that smoking and any hormonal based contraception may increase the patients risks of developing these life threatening blood clots. All patients are encouraged to stop smoking at the time of contraceptive counseling. Cessation programs were reviewed. The patient was instructed to use barrier contraception for sexually transmitted disease prevention. The patient was also informed of antibiotics decreasing contraceptive efficacy and the need for barrier contraception from the onset of her antibiotic dosing and through a minimum of thirty days from antibiotic cessation. The life threatening side effect profile was reviewed in detail this includes but is not limited to shortness of breath, chest pain, severe or persistent headaches, or calf pain. If any of these occur the patient has been instructed to stop using her hormonal based contraception, notify the office, and go to the emergency department or call 911.     The patient denied any personal history of blood clots in her leg, lung, or heart and denied any family history of stroke, TIA, sudden cardiac death < 36 y.o.,pulmonary embolism, or deep venous thrombosis. PLAN:  Return in about 1 year (around 10/20/2021) for annual.   Pap smear obtained. Patient denies any personal or family history of blood clots or sudden cardiac death prior to age 36. Signs hormonal birth control consent form  Repeat Annual every 1 year  Cervical Cytology Evaluation begins at 24years old. If Negative Cytology, Follow-up screening per current guidelines. Mammograms every 1 year. If 35 yo and last mammogram was negative. Calcium and Vitamin D dosing reviewed. Colonoscopy screening reviewed as well as onset for bone density testing. Birth control and barrier recommendations discussed. STD counseling and prevention reviewed. Gardisil counseling completed for all patients 10-35 yo. Routine health maintenance per patients PCP. Orders Placed This Encounter   Procedures    PAP SMEAR     Patient History:    Patient's last menstrual period was 2020. OBGYN Status: Having periods  Past Surgical History:  :  SECTION  2020:  SECTION; N/A      Comment:   SECTION performed by Lucero Jackson DO                at [facility] L&D OR  Medications/Contraceptives Affecting Cytology     Combination Contraceptives - Oral Disp Start End     Norgestim-Eth Estrad Triphasic (TRI-SPRINTEC) 0.18/0.215/0.25 MG-35 MCG   TABS    28 tablet 2020     Sig: Take 1 tablet by mouth daily    Route: Oral       Social History    Tobacco Use      Smoking status: Current Every Day Smoker        Packs/day: 0.25        Years: 13.00        Pack years: 3.25        Types: Cigarettes        Start date: 2002      Smokeless tobacco: Never Used      Tobacco comment: \"4cigs/day\" 2020       Standing Status:   Future     Standing Expiration Date:   10/20/2021     Order Specific Question:   Collection Type     Answer:    Thin Prep     Order Specific Question:   Prior Abnormal Pap Test     Answer:   No     Order Specific Question:   Screening or Diagnostic     Answer:   Screening     Order Specific Question:   HPV Requested?      Answer:   Yes     Order Specific Question:   High Risk Patient     Answer:   N/A

## 2020-10-25 LAB
HPV SOURCE: NORMAL
HPV, GENOTYPE 16: NOT DETECTED
HPV, GENOTYPE 18: NOT DETECTED
HPV, HIGH RISK OTHER: NOT DETECTED

## 2020-10-27 ENCOUNTER — TELEPHONE (OUTPATIENT)
Dept: OBGYN CLINIC | Age: 30
End: 2020-10-27

## 2020-10-27 LAB — CYTOLOGY REPORT: NORMAL

## 2020-10-27 RX ORDER — METRONIDAZOLE 7.5 MG/G
GEL VAGINAL
Qty: 1 TUBE | Refills: 0 | Status: SHIPPED | OUTPATIENT
Start: 2020-10-27 | End: 2021-04-21 | Stop reason: ALTCHOICE

## 2020-10-27 NOTE — TELEPHONE ENCOUNTER
Patient advised of negative pap with +BV, order for flagyl - per Erica Zaragoza CNP. Patient voiced that she cannot tolerate flagyl and prefers metrogel if approved. metrogel at hs x 5 nights pending provider's review and sign off.

## 2020-10-27 NOTE — TELEPHONE ENCOUNTER
----- Message from GRACIELA Beauchamp CNP sent at 10/27/2020 12:37 PM EDT -----  Pap smear neg  HPV not detected  AGE: 30  Repeat annual exam in 1 year.   +BV- flagyl 500mg PO BID X 7 days

## 2021-03-01 ENCOUNTER — TELEPHONE (OUTPATIENT)
Dept: PRIMARY CARE CLINIC | Age: 31
End: 2021-03-01

## 2021-03-01 ENCOUNTER — HOSPITAL ENCOUNTER (OUTPATIENT)
Age: 31
Discharge: HOME | End: 2021-03-01
Payer: COMMERCIAL

## 2021-03-01 DIAGNOSIS — N92.6 MISSED PERIOD: Primary | ICD-10-CM

## 2021-03-01 DIAGNOSIS — N92.6 MISSED PERIOD: ICD-10-CM

## 2021-03-01 LAB — HCG QUANTITATIVE: <1 IU/L

## 2021-03-01 PROCEDURE — 84702 CHORIONIC GONADOTROPIN TEST: CPT

## 2021-03-01 PROCEDURE — 36415 COLL VENOUS BLD VENIPUNCTURE: CPT

## 2021-03-01 NOTE — TELEPHONE ENCOUNTER
Patient calling stating she took her 1st pregnancy test last Monday which came out positive, she has since scheduled an appt next door for her prenatal care. She states she started bleeding on Friday last week and is still bleeding, it has slowed down but she is asking if we can order a serum pregnancy test for her, next door will not order since she is a new patient. Please advise.  Thank you

## 2021-04-21 ENCOUNTER — OFFICE VISIT (OUTPATIENT)
Dept: PRIMARY CARE CLINIC | Age: 31
End: 2021-04-21
Payer: COMMERCIAL

## 2021-04-21 VITALS
BODY MASS INDEX: 22.35 KG/M2 | OXYGEN SATURATION: 99 % | WEIGHT: 122.2 LBS | HEART RATE: 71 BPM | SYSTOLIC BLOOD PRESSURE: 102 MMHG | RESPIRATION RATE: 15 BRPM | DIASTOLIC BLOOD PRESSURE: 64 MMHG

## 2021-04-21 DIAGNOSIS — F32.A ANXIETY AND DEPRESSION: Primary | ICD-10-CM

## 2021-04-21 DIAGNOSIS — F41.9 ANXIETY AND DEPRESSION: Primary | ICD-10-CM

## 2021-04-21 DIAGNOSIS — K21.9 GASTROESOPHAGEAL REFLUX DISEASE, UNSPECIFIED WHETHER ESOPHAGITIS PRESENT: ICD-10-CM

## 2021-04-21 PROBLEM — F17.200 SMOKER: Status: RESOLVED | Noted: 2020-10-08 | Resolved: 2021-04-21

## 2021-04-21 PROCEDURE — 99213 OFFICE O/P EST LOW 20 MIN: CPT | Performed by: INTERNAL MEDICINE

## 2021-04-21 ASSESSMENT — ENCOUNTER SYMPTOMS
SHORTNESS OF BREATH: 0
DIARRHEA: 0
ABDOMINAL PAIN: 0
CONSTIPATION: 0
BACK PAIN: 0
SINUS PRESSURE: 0
NAUSEA: 0
COUGH: 0
WHEEZING: 0
VOMITING: 0
SINUS PAIN: 0
ABDOMINAL DISTENTION: 0

## 2021-04-21 NOTE — PROGRESS NOTES
[facility]  [address]  [address] [address]  Dept: [phone]  Dept Fax: [phone]    Kalyani Holliday is a 32 y.o. female who presents today for her medical conditions/complaints as noted below. Chief Complaint   Patient presents with    Follow-up     discuss medications       HPI:     This is a 68-year-old female who is here for follow-up for anxiety and depression and GERD. She has been doing well on Wellbutrin and Protonix. No complaints or concerns.       Hemoglobin A1C (%)   Date Value   2019 4.9             ( goal A1C is < 7)   No results found for: LABMICR  No results found for: LDLCHOLESTEROL, LDLCALC    (goal LDL is <100)   AST (U/L)   Date Value   2019 17     ALT (U/L)   Date Value   2019 12     BUN (mg/dL)   Date Value   2019 6     BP Readings from Last 3 Encounters:   21 102/64   10/20/20 110/72   10/08/20 108/62          (goal 120/80)    Past Medical History:   Diagnosis Date    Anxiety and depression 10/8/2020    Depression     Gastroesophageal reflux disease 10/8/2020    Gestational diabetes mellitus (GDM), antepartum 2019    IBS (irritable bowel syndrome)     Mental disorder     BIPOLAR    Pnctr w/o FB of r idx fngr w/o damage to nail, subs 2019    TSH deficiency 7/10/2019      Past Surgical History:   Procedure Laterality Date     SECTION       SECTION N/A 2020     SECTION performed by Karla Kapoor DO at [facility] L[facility] OR       Family History   Problem Relation Age of Onset    Hypertension Father    [facility] Arthritis Mother         FIBROMYALGIA, BLOOD TRANSFUSIONS, VARICOSE VEINS    Pancreatic Cancer Paternal Grandfather     Lung Cancer Paternal Grandfather        Social History     Tobacco Use    Smoking status: Current Every Day Smoker     Packs/day: 0.25     Years: 13.00     Pack years: 3.25     Types: Cigarettes     Start date: 6/11/2002    Smokeless tobacco: Never Used    Tobacco comment: \"4cigs/day\" 1/14/2020   Substance Use Topics    Alcohol use: Not Currently     Comment: occasionally      Current Outpatient Medications   Medication Sig Dispense Refill    pantoprazole (PROTONIX) 40 MG tablet Take 1 tablet by mouth daily (Patient not taking: Reported on 4/21/2021) 60 tablet 1    buPROPion (WELLBUTRIN XL) 150 MG extended release tablet Take 1 tablet by mouth every morning (Patient not taking: Reported on 4/21/2021) 90 tablet 1     No current facility-administered medications for this visit. Allergies   Allergen Reactions    Arestin [Minocycline] Hives       Health Maintenance   Topic Date Due    Varicella vaccine (1 of 2 - 2-dose childhood series) Never done    COVID-19 Vaccine (1) Never done    Cervical cancer screen  10/20/2021    DTaP/Tdap/Td vaccine (2 - Td) 11/07/2029    Flu vaccine  Completed    Pneumococcal 0-64 years Vaccine  Completed    Hepatitis C screen  Completed    HIV screen  Completed    Hepatitis A vaccine  Aged Out    Hepatitis B vaccine  Aged Out    Hib vaccine  Aged Out    Meningococcal (ACWY) vaccine  Aged Out       Subjective:      Review of Systems   Constitutional: Negative for activity change, appetite change, chills, fatigue and fever. HENT: Negative for congestion, ear pain, hearing loss, nosebleeds, sinus pressure, sinus pain and sneezing. Eyes: Negative for visual disturbance. Respiratory: Negative for cough, shortness of breath and wheezing. Cardiovascular: Negative for chest pain and palpitations. Gastrointestinal: Negative for abdominal distention, abdominal pain, constipation, diarrhea, nausea and vomiting. Endocrine: Negative for cold intolerance, heat intolerance, polydipsia, polyphagia and polyuria. Genitourinary: Negative for difficulty urinating, menstrual problem, vaginal bleeding and vaginal discharge.    Musculoskeletal: Negative for back pain and joint swelling. Skin: Negative for rash. Neurological: Negative for numbness and headaches. Psychiatric/Behavioral: Negative for sleep disturbance. The patient is nervous/anxious. All other systems reviewed and are negative. Objective:     Physical Exam  Vitals signs and nursing note reviewed. Constitutional:       General: She is not in acute distress. Appearance: She is well-developed. She is not diaphoretic. HENT:      Head: Normocephalic and atraumatic. Right Ear: Hearing normal.      Left Ear: Hearing normal.      Mouth/Throat:      Pharynx: Uvula midline. Eyes:      General: No scleral icterus. Conjunctiva/sclera: Conjunctivae normal.      Pupils: Pupils are equal, round, and reactive to light. Neck:      Musculoskeletal: Full passive range of motion without pain, normal range of motion and neck supple. Thyroid: No thyroid mass or thyromegaly. Vascular: No JVD. Trachea: Phonation normal.   Cardiovascular:      Rate and Rhythm: Normal rate and regular rhythm. Pulses: No decreased pulses. Carotid pulses are 2+ on the right side and 2+ on the left side. Radial pulses are 2+ on the right side and 2+ on the left side. Heart sounds: Normal heart sounds. No murmur. Pulmonary:      Effort: Pulmonary effort is normal. No accessory muscle usage or respiratory distress. Breath sounds: Normal breath sounds. No wheezing or rales. Abdominal:      General: Bowel sounds are normal. There is no distension. Palpations: Abdomen is soft. Tenderness: There is no abdominal tenderness. Musculoskeletal: Normal range of motion. General: No deformity. Lymphadenopathy:      Cervical: No cervical adenopathy. Skin:     General: Skin is warm. Capillary Refill: Capillary refill takes less than 2 seconds. Findings: No rash. Neurological:      Mental Status: She is alert and oriented to person, place, and time.       Deep Tendon Reflexes: Reflexes normal.   Psychiatric:         Behavior: Behavior normal.       /64   Pulse 71   Resp 15   Wt 122 lb 3.2 oz (55.4 kg)   SpO2 99%   BMI 22.35 kg/m²     Assessment:          1. Anxiety and depression  Wellbutrin    2. Gastroesophageal reflux disease, unspecified whether esophagitis present  Protonix            Diagnosis Orders   1. Anxiety and depression     2. Gastroesophageal reflux disease, unspecified whether esophagitis present             Plan:      Return in about 3 months (around 7/21/2021) for Routine follow-up. No orders of the defined types were placed in this encounter. No orders of the defined types were placed in this encounter. Patient given educational materials - see patient instructions. Discussed use, benefit, and side effects of prescribedmedications. All patient questions answered. Pt voiced understanding. Reviewed health maintenance. Instructed to continue current medications, diet and exercise. Patient agreed with treatment plan. Follow up as directed. I spent a total of 15 minutes face to face with this patient. Over 50% of that time was spent on counseling and care coordination. Please see assessment and plan for details. Electronically signed by Gala Dumont MD on 4/21/2021 at 4:43 PM      Please note that this chart was generated using voice recognition Dragon dictation software. Although every effort was made to ensure the accuracy of this automatedtranscription, some errors in transcription may have occurred.

## 2021-04-29 ENCOUNTER — HOSPITAL ENCOUNTER (OUTPATIENT)
Age: 31
Discharge: HOME | End: 2021-04-29
Payer: COMMERCIAL

## 2021-04-29 ENCOUNTER — TELEPHONE (OUTPATIENT)
Dept: PRIMARY CARE CLINIC | Age: 31
End: 2021-04-29

## 2021-04-29 DIAGNOSIS — N92.6 MISSED PERIOD: ICD-10-CM

## 2021-04-29 DIAGNOSIS — Z86.32 HISTORY OF GESTATIONAL DIABETES MELLITUS (GDM): ICD-10-CM

## 2021-04-29 DIAGNOSIS — N92.6 MISSED PERIOD: Primary | ICD-10-CM

## 2021-04-29 DIAGNOSIS — Z00.00 ANNUAL PHYSICAL EXAM: ICD-10-CM

## 2021-04-29 LAB
ABSOLUTE EOS #: 0 K/UL (ref 0–0.4)
ABSOLUTE IMMATURE GRANULOCYTE: ABNORMAL K/UL (ref 0–0.3)
ABSOLUTE LYMPH #: 1.5 K/UL (ref 1–4.8)
ABSOLUTE MONO #: 0.5 K/UL (ref 0.1–1.3)
ALBUMIN SERPL-MCNC: 4.4 G/DL (ref 3.5–5.2)
ALBUMIN/GLOBULIN RATIO: ABNORMAL (ref 1–2.5)
ALP BLD-CCNC: 56 U/L (ref 35–104)
ALT SERPL-CCNC: 10 U/L (ref 5–33)
ANION GAP SERPL CALCULATED.3IONS-SCNC: 8 MMOL/L (ref 9–17)
AST SERPL-CCNC: 17 U/L
BASOPHILS # BLD: 1 % (ref 0–2)
BASOPHILS ABSOLUTE: 0 K/UL (ref 0–0.2)
BILIRUB SERPL-MCNC: 0.5 MG/DL (ref 0.3–1.2)
BUN BLDV-MCNC: 6 MG/DL (ref 6–20)
BUN/CREAT BLD: ABNORMAL (ref 9–20)
CALCIUM SERPL-MCNC: 9.2 MG/DL (ref 8.6–10.4)
CHLORIDE BLD-SCNC: 103 MMOL/L (ref 98–107)
CHOLESTEROL/HDL RATIO: 2.4
CHOLESTEROL: 149 MG/DL
CO2: 26 MMOL/L (ref 20–31)
CREAT SERPL-MCNC: 0.51 MG/DL (ref 0.5–0.9)
DIFFERENTIAL TYPE: ABNORMAL
EOSINOPHILS RELATIVE PERCENT: 1 % (ref 0–4)
ESTIMATED AVERAGE GLUCOSE: 85 MG/DL
FOLATE: >20 NG/ML
GFR AFRICAN AMERICAN: >60 ML/MIN
GFR NON-AFRICAN AMERICAN: >60 ML/MIN
GFR SERPL CREATININE-BSD FRML MDRD: ABNORMAL ML/MIN/{1.73_M2}
GFR SERPL CREATININE-BSD FRML MDRD: ABNORMAL ML/MIN/{1.73_M2}
GLUCOSE BLD-MCNC: 111 MG/DL (ref 70–99)
HBA1C MFR BLD: 4.6 % (ref 4–6)
HCG QUANTITATIVE: 13 IU/L
HCT VFR BLD CALC: 40 % (ref 36–46)
HDLC SERPL-MCNC: 61 MG/DL
HEMOGLOBIN: 13.4 G/DL (ref 12–16)
IMMATURE GRANULOCYTES: ABNORMAL %
LDL CHOLESTEROL: 75 MG/DL (ref 0–130)
LYMPHOCYTES # BLD: 29 % (ref 24–44)
MCH RBC QN AUTO: 29.6 PG (ref 26–34)
MCHC RBC AUTO-ENTMCNC: 33.5 G/DL (ref 31–37)
MCV RBC AUTO: 88.3 FL (ref 80–100)
MONOCYTES # BLD: 9 % (ref 1–7)
NRBC AUTOMATED: ABNORMAL PER 100 WBC
PDW BLD-RTO: 13.9 % (ref 11.5–14.9)
PLATELET # BLD: 257 K/UL (ref 150–450)
PLATELET ESTIMATE: ABNORMAL
PMV BLD AUTO: 8.3 FL (ref 6–12)
POTASSIUM SERPL-SCNC: 3.6 MMOL/L (ref 3.7–5.3)
RBC # BLD: 4.53 M/UL (ref 4–5.2)
RBC # BLD: ABNORMAL 10*6/UL
SEG NEUTROPHILS: 60 % (ref 36–66)
SEGMENTED NEUTROPHILS ABSOLUTE COUNT: 3.2 K/UL (ref 1.3–9.1)
SODIUM BLD-SCNC: 137 MMOL/L (ref 135–144)
TOTAL PROTEIN: 7.5 G/DL (ref 6.4–8.3)
TRIGL SERPL-MCNC: 66 MG/DL
TSH SERPL DL<=0.05 MIU/L-ACNC: 0.47 MIU/L (ref 0.3–5)
VITAMIN B-12: 411 PG/ML (ref 232–1245)
VITAMIN D 25-HYDROXY: 23.6 NG/ML (ref 30–100)
VLDLC SERPL CALC-MCNC: NORMAL MG/DL (ref 1–30)
WBC # BLD: 5.3 K/UL (ref 3.5–11)
WBC # BLD: ABNORMAL 10*3/UL

## 2021-04-29 PROCEDURE — 84443 ASSAY THYROID STIM HORMONE: CPT

## 2021-04-29 PROCEDURE — 80061 LIPID PANEL: CPT

## 2021-04-29 PROCEDURE — 82607 VITAMIN B-12: CPT

## 2021-04-29 PROCEDURE — 36415 COLL VENOUS BLD VENIPUNCTURE: CPT

## 2021-04-29 PROCEDURE — 84702 CHORIONIC GONADOTROPIN TEST: CPT

## 2021-04-29 PROCEDURE — 82306 VITAMIN D 25 HYDROXY: CPT

## 2021-04-29 PROCEDURE — 82746 ASSAY OF FOLIC ACID SERUM: CPT

## 2021-04-29 PROCEDURE — 80053 COMPREHEN METABOLIC PANEL: CPT

## 2021-04-29 PROCEDURE — 83036 HEMOGLOBIN GLYCOSYLATED A1C: CPT

## 2021-04-29 PROCEDURE — 85025 COMPLETE CBC W/AUTO DIFF WBC: CPT

## 2021-04-29 NOTE — TELEPHONE ENCOUNTER
Patient called asking if an order could be placed for a HCG pregnancy test. She states that she will be completing at a [place] lab. Please advise.

## 2021-05-05 DIAGNOSIS — N93.8 DUB (DYSFUNCTIONAL UTERINE BLEEDING): Primary | ICD-10-CM

## 2021-06-04 ENCOUNTER — OFFICE VISIT (OUTPATIENT)
Dept: OBGYN CLINIC | Age: 31
End: 2021-06-04
Payer: COMMERCIAL

## 2021-06-04 VITALS — WEIGHT: 119 LBS | DIASTOLIC BLOOD PRESSURE: 62 MMHG | BODY MASS INDEX: 21.77 KG/M2 | SYSTOLIC BLOOD PRESSURE: 118 MMHG

## 2021-06-04 DIAGNOSIS — N93.9 VAGINAL BLEEDING: Primary | ICD-10-CM

## 2021-06-04 PROCEDURE — 99202 OFFICE O/P NEW SF 15 MIN: CPT | Performed by: OBSTETRICS & GYNECOLOGY

## 2021-06-04 NOTE — PROGRESS NOTES
Lauren Butterfield  [id number]    YOB: 1990        HPI:  Wyatt Min is a 32 y.o. female [id number]     Patient with + HCG of 13 by quant analysis a few days after missed menses and then had bleeding like normal menses two days after the quant HCG + finding. Patient has had normal menses since the + HCG. Has a few questions about blighted ovum / chemical pregnancy.        OB History    Para Term  AB Living   3 2 2 0 1 2   SAB TAB Ectopic Molar Multiple Live Births   1 0 0 0 0 2      # Outcome Date GA Lbr Melvin/2nd Weight Sex Delivery Anes PTL Lv   3 Term 20 38w0d  4 lb 6.1 oz (1.986 kg) M CS-LTranv Spinal N MANDY      Complications: IUGR (intrauterine growth restriction) affecting care of mother      Name: Zach Niece: 9  Apgar5: 9   2 SAB            1 Term 09 38w0d  5 lb 12 oz (2.608 kg) M CS-Unspec   MANDY      Name: Valeria Blackmon: 5  Apgar5: 9       Past Medical History:   Diagnosis Date    Anxiety and depression 10/8/2020    Depression     Gastroesophageal reflux disease 10/8/2020    Gestational diabetes mellitus (GDM), antepartum 2019    IBS (irritable bowel syndrome)     Mental disorder     BIPOLAR    Pnctr w/o FB of r idx fngr w/o damage to nail, subs 2019    TSH deficiency 7/10/2019       Past Surgical History:   Procedure Laterality Date     SECTION       SECTION N/A 2020     SECTION performed by Linda Mejias DO at [facility] L[facility] OR       Family History   Problem Relation Age of Onset    Hypertension Father    [facility] Arthritis Mother         FIBROMYALGIA, BLOOD TRANSFUSIONS, VARICOSE VEINS    Pancreatic Cancer Paternal Grandfather     Lung Cancer Paternal Grandfather        Social History     Socioeconomic History    Marital status:      Spouse name: Not on file    Number of children: Not on file    Years of education: Not on file    Highest education level: Not on file   Occupational History    Not on file   Tobacco Use    Smoking status: Current Every Day Smoker     Packs/day: 0.25     Years: 13.00     Pack years: 3.25     Types: Cigarettes     Start date: 6/11/2002    Smokeless tobacco: Never Used    Tobacco comment: \"4cigs/day\" 1/14/2020   Vaping Use    Vaping Use: Never used   Substance and Sexual Activity    Alcohol use: Not Currently     Comment: occasionally    Drug use: Yes     Frequency: 3.0 times per week     Types: Marijuana     Comment: \"3x's/week\" 1/14/2020    Sexual activity: Yes     Partners: Male   Other Topics Concern    Not on file   Social History Narrative    Not on file     Social Determinants of Health     Financial Resource Strain:     Difficulty of Paying Living Expenses:    Food Insecurity:     Worried About Running Out of Food in the Last Year:     Ran Out of Food in the Last Year:    Transportation Needs:     Lack of Transportation (Medical):  Lack of Transportation (Non-Medical):    Physical Activity:     Days of Exercise per Week:     Minutes of Exercise per Session:    Stress:     Feeling of Stress :    Social Connections:     Frequency of Communication with Friends and Family:     Frequency of Social Gatherings with Friends and Family:     Attends Yazidi Services:     Active Member of Clubs or Organizations:     Attends Club or Organization Meetings:     Marital Status:    Intimate Partner Violence:     Fear of Current or Ex-Partner:     Emotionally Abused:     Physically Abused:     Sexually Abused:          MEDICATIONS:  No current outpatient medications on file. No current facility-administered medications for this visit. ALLERGIES:  Allergies as of 06/04/2021 - Fully Reviewed 06/04/2021   Allergen Reaction Noted    Arestin [minocycline] Hives 06/11/2019       Review Of Systems (11 point):  Constitutional: No fever, chills or malaise;  No weight change or fatigue  Head and Eyes: No vision, Headache, Dizziness or trauma in last 12 months  ENT ROS: No hearing, Tinnitis, sinus or taste problems  Hematological and Lymphatic ROS:No Lymphoma, Von Willebrand's, Hemophillia or Bleeding History  Psych ROS: No Depression, Homicidal thoughts,suicidal thoughts, or anxiety  Breast ROS: No prior breast abnormalities or lumps  Respiratory ROS: No SOB, Pneumoniae,Cough, or Pulmonary Embolism History  Cardiovascular ROS: No Chest Pain with Exertion, Palpitations, Syncope, Edema, Arrhythmia  Gastrointestinal ROS: No Indigestion, Heartburn, Nausea, vomiting, Diarrhea, Constipation,or Bowel Changes; No Bloody Stools or melena  Genito-Urinary ROS: No Dysuria, Hematuria or Nocturia. No Urinary Incontinence or Vaginal Discharge  Musculoskeletal ROS: No Arthralgia, Arthritis,Gout,Osteoporosis or Rheumatism  Neurological ROS: No CVA, Migraines, Epilepsy, Seizure Hx, or Limb Weakness  Dermatological ROS: No Rash, Itching, Hives, Mole Changes or Cancer          Blood pressure 118/62, weight 119 lb (54 kg), last menstrual period 05/30/2021, not currently breastfeeding. Abdomen: Soft non-tender; good bowel sounds. No guarding, rebound or rigidity. No CVA tenderness bilaterally. Extremities: No calf tenderness, DTR 2/4, and No edema bilaterally    Pelvic: Exam deferred. Diagnostics:  No results found.     Lab Results:  Results for orders placed or performed during the hospital encounter of 04/29/21   Hemoglobin A1C   Result Value Ref Range    Hemoglobin A1C 4.6 4.0 - 6.0 %    Estimated Avg Glucose 85 mg/dL   hCG, Quantitative, Pregnancy   Result Value Ref Range    hCG Quant 13 (H) <5 IU/L   Vitamin D 25 Hydroxy   Result Value Ref Range    Vit D, 25-Hydroxy 23.6 (L) 30.0 - 100.0 ng/mL   Vitamin B12 & Folate   Result Value Ref Range    Vitamin B-12 411 232 - 1245 pg/mL    Folate >20.0 >4.8 ng/mL   Comprehensive Metabolic Panel   Result Value Ref Range    Glucose 111 (H) 70 - 99 mg/dL    BUN 6 6 - 20 mg/dL    CREATININE 0. 51 0.50 - 0.90 mg/dL    Bun/Cre Ratio NOT REPORTED 9 - 20    Calcium 9.2 8.6 - 10.4 mg/dL    Sodium 137 135 - 144 mmol/L    Potassium 3.6 (L) 3.7 - 5.3 mmol/L    Chloride 103 98 - 107 mmol/L    CO2 26 20 - 31 mmol/L    Anion Gap 8 (L) 9 - 17 mmol/L    Alkaline Phosphatase 56 35 - 104 U/L    ALT 10 5 - 33 U/L    AST 17 <32 U/L    Total Bilirubin 0.50 0.3 - 1.2 mg/dL    Total Protein 7.5 6.4 - 8.3 g/dL    Albumin 4.4 3.5 - 5.2 g/dL    Albumin/Globulin Ratio NOT REPORTED 1.0 - 2.5    GFR Non-African American >60 >60 mL/min    GFR African American >60 >60 mL/min    GFR Comment          GFR Staging NOT REPORTED    TSH with Reflex   Result Value Ref Range    TSH 0.47 0.30 - 5.00 mIU/L   CBC Auto Differential   Result Value Ref Range    WBC 5.3 3.5 - 11.0 k/uL    RBC 4.53 4.0 - 5.2 m/uL    Hemoglobin 13.4 12.0 - 16.0 g/dL    Hematocrit 40.0 36 - 46 %    MCV 88.3 80 - 100 fL    MCH 29.6 26 - 34 pg    MCHC 33.5 31 - 37 g/dL    RDW 13.9 11.5 - 14.9 %    Platelets 630 097 - 351 k/uL    MPV 8.3 6.0 - 12.0 fL    NRBC Automated NOT REPORTED per 100 WBC    Differential Type NOT REPORTED     Seg Neutrophils 60 36 - 66 %    Lymphocytes 29 24 - 44 %    Monocytes 9 (H) 1 - 7 %    Eosinophils % 1 0 - 4 %    Basophils 1 0 - 2 %    Immature Granulocytes NOT REPORTED 0 %    Segs Absolute 3.20 1.3 - 9.1 k/uL    Absolute Lymph # 1.50 1.0 - 4.8 k/uL    Absolute Mono # 0.50 0.1 - 1.3 k/uL    Absolute Eos # 0.00 0.0 - 0.4 k/uL    Basophils Absolute 0.00 0.0 - 0.2 k/uL    Absolute Immature Granulocyte NOT REPORTED 0.00 - 0.30 k/uL    WBC Morphology NOT REPORTED     RBC Morphology NOT REPORTED     Platelet Estimate NOT REPORTED    Lipid Panel   Result Value Ref Range    Cholesterol 149 <200 mg/dL    HDL 61 >40 mg/dL    LDL Cholesterol 75 0 - 130 mg/dL    Chol/HDL Ratio 2.4 <5    Triglycerides 66 <150 mg/dL    VLDL NOT REPORTED 1 - 30 mg/dL         Assessment:  Chemical pregnancy   Patient Active Problem List    Diagnosis Date Noted    Anxiety and depression 10/08/2020    Gastroesophageal reflux disease 10/08/2020    Postpartum state     RLTCS 20 M APG  Wt 4#6 2020    Delivery with history of      Hx CS x1 2019    Abnormal urinalysis 2019: UA showed large Hgb in urine, microscopic UA showed  RBC. Rx Keflex given to patient, will need repeat UA. UCx pending       Contact with hypodermic needle 2019    Pnctr w/o FB of r idx fngr w/o damage to nail, subs 2019    IBS (irritable bowel syndrome)          PLAN:  Discussed pathophysiology of blighted ovum and reason it occurs, responses to hormones similar to regular pregnancy, etc.  All questions answered.     RTO as needed

## 2021-07-06 ENCOUNTER — TELEPHONE (OUTPATIENT)
Dept: OBGYN CLINIC | Age: 31
End: 2021-07-06

## 2021-07-06 ENCOUNTER — HOSPITAL ENCOUNTER (OUTPATIENT)
Age: 31
Discharge: HOME | End: 2021-07-06
Payer: COMMERCIAL

## 2021-07-06 DIAGNOSIS — N92.6 MISSED PERIOD: Primary | ICD-10-CM

## 2021-07-06 DIAGNOSIS — N92.6 MISSED PERIOD: ICD-10-CM

## 2021-07-06 LAB — HCG QUANTITATIVE: 3074 IU/L

## 2021-07-06 PROCEDURE — 36415 COLL VENOUS BLD VENIPUNCTURE: CPT

## 2021-07-06 PROCEDURE — 84702 CHORIONIC GONADOTROPIN TEST: CPT

## 2021-08-05 ENCOUNTER — HOSPITAL ENCOUNTER (OUTPATIENT)
Age: 31
Setting detail: SPECIMEN
Discharge: HOME | End: 2021-08-05
Payer: COMMERCIAL

## 2021-08-05 ENCOUNTER — INITIAL PRENATAL (OUTPATIENT)
Dept: OBGYN CLINIC | Age: 31
End: 2021-08-05

## 2021-08-05 VITALS — DIASTOLIC BLOOD PRESSURE: 60 MMHG | WEIGHT: 116 LBS | SYSTOLIC BLOOD PRESSURE: 100 MMHG | BODY MASS INDEX: 21.22 KG/M2

## 2021-08-05 DIAGNOSIS — Z98.891 HISTORY OF C-SECTION: ICD-10-CM

## 2021-08-05 DIAGNOSIS — Z86.32 HISTORY OF GESTATIONAL DIABETES: ICD-10-CM

## 2021-08-05 DIAGNOSIS — R53.83 FATIGUE, UNSPECIFIED TYPE: ICD-10-CM

## 2021-08-05 DIAGNOSIS — Z34.91 NORMAL PREGNANCY IN FIRST TRIMESTER: Primary | ICD-10-CM

## 2021-08-05 DIAGNOSIS — Z34.91 NORMAL PREGNANCY IN FIRST TRIMESTER: ICD-10-CM

## 2021-08-05 DIAGNOSIS — Z3A.09 9 WEEKS GESTATION OF PREGNANCY: ICD-10-CM

## 2021-08-05 LAB
-: ABNORMAL
ABO/RH: NORMAL
ABSOLUTE EOS #: <0.03 K/UL (ref 0–0.44)
ABSOLUTE IMMATURE GRANULOCYTE: 0.03 K/UL (ref 0–0.3)
ABSOLUTE LYMPH #: 1.3 K/UL (ref 1.1–3.7)
ABSOLUTE MONO #: 0.68 K/UL (ref 0.1–1.2)
AMORPHOUS: ABNORMAL
AMPHETAMINE SCREEN URINE: NEGATIVE
ANTIBODY SCREEN: NEGATIVE
BACTERIA: ABNORMAL
BARBITURATE SCREEN URINE: NEGATIVE
BASOPHILS # BLD: 0 % (ref 0–2)
BASOPHILS ABSOLUTE: 0.03 K/UL (ref 0–0.2)
BENZODIAZEPINE SCREEN, URINE: NEGATIVE
BILIRUBIN URINE: NEGATIVE
BUPRENORPHINE URINE: ABNORMAL
CANNABINOID SCREEN URINE: POSITIVE
CASTS UA: ABNORMAL /LPF (ref 0–8)
COCAINE METABOLITE, URINE: NEGATIVE
COLOR: ABNORMAL
COMMENT UA: ABNORMAL
CRYSTALS, UA: ABNORMAL /HPF
DIFFERENTIAL TYPE: ABNORMAL
EOSINOPHILS RELATIVE PERCENT: 0 % (ref 1–4)
EPITHELIAL CELLS UA: ABNORMAL /HPF (ref 0–5)
FERRITIN: 127 UG/L (ref 13–150)
GLUCOSE URINE: NEGATIVE
HCG QUANTITATIVE: ABNORMAL IU/L
HCT VFR BLD CALC: 42.5 % (ref 36.3–47.1)
HEMOGLOBIN: 13.7 G/DL (ref 11.9–15.1)
HEPATITIS B SURFACE ANTIGEN: NONREACTIVE
HIV AG/AB: NONREACTIVE
IMMATURE GRANULOCYTES: 0 %
IRON SATURATION: 34 % (ref 20–55)
IRON: 106 UG/DL (ref 37–145)
KETONES, URINE: ABNORMAL
LEUKOCYTE ESTERASE, URINE: NEGATIVE
LYMPHOCYTES # BLD: 13 % (ref 24–43)
MCH RBC QN AUTO: 28.5 PG (ref 25.2–33.5)
MCHC RBC AUTO-ENTMCNC: 32.2 G/DL (ref 28.4–34.8)
MCV RBC AUTO: 88.4 FL (ref 82.6–102.9)
MDMA URINE: ABNORMAL
METHADONE SCREEN, URINE: NEGATIVE
METHAMPHETAMINE, URINE: ABNORMAL
MONOCYTES # BLD: 7 % (ref 3–12)
MUCUS: ABNORMAL
NITRITE, URINE: NEGATIVE
NRBC AUTOMATED: 0 PER 100 WBC
OPIATES, URINE: NEGATIVE
OTHER OBSERVATIONS UA: ABNORMAL
OXYCODONE SCREEN URINE: NEGATIVE
PDW BLD-RTO: 13.5 % (ref 11.8–14.4)
PH UA: 6 (ref 5–8)
PHENCYCLIDINE, URINE: NEGATIVE
PLATELET # BLD: 287 K/UL (ref 138–453)
PLATELET ESTIMATE: ABNORMAL
PMV BLD AUTO: 10 FL (ref 8.1–13.5)
PROPOXYPHENE, URINE: ABNORMAL
PROTEIN UA: ABNORMAL
RBC # BLD: 4.81 M/UL (ref 3.95–5.11)
RBC # BLD: ABNORMAL 10*6/UL
RBC UA: ABNORMAL /HPF (ref 0–4)
RENAL EPITHELIAL, UA: ABNORMAL /HPF
RUBV IGG SER QL: 218 IU/ML
SEG NEUTROPHILS: 80 % (ref 36–65)
SEGMENTED NEUTROPHILS ABSOLUTE COUNT: 7.74 K/UL (ref 1.5–8.1)
SPECIFIC GRAVITY UA: 1.03 (ref 1–1.03)
T. PALLIDUM, IGG: NONREACTIVE
TEST INFORMATION: ABNORMAL
THYROXINE, FREE: 2.09 NG/DL (ref 0.93–1.7)
TOTAL IRON BINDING CAPACITY: 311 UG/DL (ref 250–450)
TRICHOMONAS: ABNORMAL
TRICYCLIC ANTIDEPRESSANTS, UR: ABNORMAL
TSH SERPL DL<=0.05 MIU/L-ACNC: 0.09 MIU/L (ref 0.3–5)
TURBIDITY: CLEAR
UNSATURATED IRON BINDING CAPACITY: 205 UG/DL (ref 112–347)
URINE HGB: ABNORMAL
UROBILINOGEN, URINE: NORMAL
WBC # BLD: 9.8 K/UL (ref 3.5–11.3)
WBC # BLD: ABNORMAL 10*3/UL
WBC UA: ABNORMAL /HPF (ref 0–5)
YEAST: ABNORMAL

## 2021-08-05 PROCEDURE — 0500F INITIAL PRENATAL CARE VISIT: CPT | Performed by: NURSE PRACTITIONER

## 2021-08-05 NOTE — PATIENT INSTRUCTIONS
After Hours Number: You can call the office [phone]  or [phone]  Call if you have:  1. Bleeding like a period  2. Cramps or contractions greater than 2 hours  3. If you are leaking fluid  4. If you've a fever greater than 100°  5. If you feel as if baby is not moving  6. If you have continuous vomiting over 3-4 hours        Patient was counseled on weight gain in pregnancy with the following recommendation made based on her BMI:     Singletons:  BMI <18.5: 28-40 lbs weight gain  BMI 18.5-24.9: 25-35 lbs weight gain   BMI 25-29.9: 15-25 lbs weight gain  BMI >/= 30: 11-20 lbs weight gain    Up to 16 weeks around 1 pound a month  16-28 weeks- 2-4 pounds a month  >28 weeks - around 1 pound a week due to increased growth and blood volume      Twins:  BMI <18.5: no reccomendations  BMI 18.5-24.9: 37-45 lbs weight gain  BMI 25-29.9: 31-50 lbs weight gain  BMI >/= 30: 25-42 lbs weight gain    During Pregnancy: Exercises  Your Care Instructions  Here are some examples of exercises to do during your pregnancy. Start each exercise slowly. Ease off the exercise if you start to have pain. Your doctor or physical therapist will tell you when you can start these exercises and which ones will work best for you. How to do the exercises  Neck rotation    1. Sit in a firm chair, or stand up straight. 2. Keeping your chin level, turn your head to the right, and hold for 15 to 30 seconds. 3. Turn your head to the left and hold for 15 to 30 seconds. 4. Repeat 2 to 4 times to each side. Forward neck flexion    1. Sit in a firm chair, or stand up straight. 2. Bend your head forward. 3. Hold for 15 to 30 seconds. 4. Repeat 2 to 4 times. Back press    1. Place your feet 10 to 12 inches from the wall. 2. Rest your back flat against the wall and slide down the wall until your knees are slightly bent. 3. Press your lower back against the wall by pulling in your stomach muscles.   4. Hold for 6 seconds, and then relax your stomach muscles and slide back up the wall. 5. Repeat 8 to 12 times. Full body twist    1. Sit with your legs crossed. 2. Reach your left hand toward your right foot, and place your right hand at your side for support. 3. Slowly twist your torso to your right. 4. Switch your hands and twist to your left. 5. Repeat 2 to 4 times. Pelvic rocking    1. Kneeling on hands and knees, place your hands directly under your shoulders and your knees under your hips. 2. Breathe in deeply. Tuck your head downward and round your back up, making a curve with your back in the shape of the letter C. Hold this position for a count of 6.  3. Breathe out slowly and bring your head back up. Relax, keeping your back straight (don't allow it to curve toward the floor). Hold this for a count of 6.  4. Do this exercise 8 times or to your comfort level. Pelvic tilt    1. Lie on your back. 2. Keep your knees relaxed. 3. Tighten your belly and buttocks muscles. 4. At the same time, gently shift your pelvis upward. This should flatten the curve in your back. 5. Hold for 6 seconds and then relax. 6. Gradually increase the number of tilts you do each day, to your comfort level. Backward stretch    1. Kneel on hands and knees with your knees 8 to 10 inches apart, hands directly under your shoulders, and arms and back straight. 2. Keeping your arms straight, slowly lower your buttocks toward your heels and tuck your head toward your knees. Hold for 15 to 30 seconds. 3. Slowly return to the kneeling position. 4. Repeat 2 to 4 times. Forward bend    1. Sit comfortably in a chair, with your arms relaxed. 2. Slowly bend forward, allowing your arms to hang down in front of you. Lean only as far as you can without feeling discomfort or pressure on your belly. 3. Hold for 15 to 30 seconds and then slowly sit up straight. 4. Repeat 2 to 4 times or to your comfort level. Leg lift crawl    1.  Kneeling on hands and knees, place your hands directly under your shoulders and straighten your arms. 2. Tighten your belly muscles by pulling in your belly button toward your spine. Be sure you continue to breathe normally and do not hold your breath. 3. Lift your left knee and bring it toward your elbow. 4. Slowly extend your leg behind you without completely straightening it. Be careful not to let your hip drop down. Avoid arching your back. 5. Hold your leg behind you for about 6 seconds. 6. Return to your starting position. 7. Do the same exercise with your other leg. 8. Repeat 8 to 12 times for each leg. Tailor sitting    1. Sit on the floor. 2. Bring your feet close to your body while crossing your ankles. 3. Hold this position for as long as you are comfortable. Tailor stretching    1. Sit on the floor with your back straight, legs about 12 inches apart, and feet relaxed outward. 2. Stretch your hands forward toward your left foot, then sit up. 3. Stretch your hands straight forward, then sit up. 4. Stretch your hands forward toward your right foot, then sit up. 5. Hold each stretch for 15 to 30 seconds. 6. Repeat 2 to 4 times. Follow-up care is a key part of your treatment and safety. Be sure to make and go to all appointments, and call your doctor if you are having problems. It's also a good idea to know your test results and keep a list of the medicines you take. Where can you learn more? Go to https://Bungles JunglespeBuddy Drinks.[employer]. org and sign in to your [employer] account. Enter G090 in the [facility] box to learn more about \"During Pregnancy: Exercises. \"     If you do not have an account, please click on the \"Sign Up Now\" link. Current as of: September 5, 2018  Content Version: 12.0  © 7447-5616 Healthwise, Incorporated. Care instructions adapted under license by [email or web address] ([facility]).  If you have questions about a medical condition or this instruction, always ask your healthcare professional. [employer], [facility] disclaims any warranty or liability for your use of this information. Nutrition During Pregnancy: Care Instructions  Your Care Instructions    Healthy eating when you are pregnant is important for you and your baby. It can help you feel well and have a successful pregnancy and delivery. During pregnancy your nutrition needs increase. Even if you have excellent eating habits, your doctor may recommend a multivitamin to make sure you get enough iron and folic acid. Many pregnant women wonder how much weight they should gain. In general, women who were at a healthy weight before they became pregnant should gain between 25 and 35 pounds. Women who were overweight before pregnancy are usually advised to gain 15 to 25 pounds. Women who were underweight before pregnancy are usually advised to gain 28 to 40 pounds. Your doctor will work with you to set a weight goal that is right for you. Gaining a healthy amount of weight helps you have a healthy baby. Follow-up care is a key part of your treatment and safety. Be sure to make and go to all appointments, and call your doctor if you are having problems. It's also a good idea to know your test results and keep a list of the medicines you take. How can you care for yourself at home? · Eat plenty of fruits and vegetables. Include a variety of orange, yellow, and leafy dark-green vegetables every day. · Choose whole-grain bread, cereal, and pasta. Good choices include whole wheat bread, whole wheat pasta, brown rice, and oatmeal.  · Get 4 or more servings of milk and milk products each day. Good choices include nonfat or low-fat milk, yogurt, and cheese. If you cannot eat milk products, you can get calcium from calcium-fortified products such as orange juice, soy milk, and tofu. Other non-milk sources of calcium include leafy green vegetables, such as broccoli, kale, mustard greens, turnip greens, bok jazmín, and brussels sprouts.   · If you eat meat, pick lower-fat types. Good choices include lean cuts of meat and chicken or turkey without the skin. · Do not eat shark, swordfish, ash mackerel, or tilefish. They have high levels of mercury, which is dangerous to your baby. You can eat up to 12 ounces a week of fish or shellfish that have low mercury levels. Good choices include shrimp, wild salmon, pollock, and catfish. Do not eat more than 6 ounces of tuna each week. · Heat lunch meats (such as turkey, ham, or bologna) to 165°F before you eat them. This reduces your risk of getting sick from a kind of bacteria that can be found in lunch meats. · Do not eat unpasteurized soft cheeses, such as brie, feta, fresh mozzarella, and blue cheese. They have a bacteria that could harm your baby. · Limit caffeine. If you drink coffee or tea, have no more than 1 cup a day. Caffeine is also found in veronica. · Do not drink any alcohol. No amount of alcohol has been found to be safe during pregnancy. · Do not diet or try to lose weight. For example, do not follow a low-carbohydrate diet. If you are overweight at the start of your pregnancy, your doctor will work with you to manage your weight gain. · Tell your doctor about all vitamins and supplements you take. When should you call for help? Watch closely for changes in your health, and be sure to contact your doctor if you have any problems. Where can you learn more? Go to https://SoshowisejonnyNeedbox AS.healthQuickBlox. org and sign in to your [employer] account. Enter Y785 in the [employer] box to learn more about \"Nutrition During Pregnancy: Care Instructions. \"     If you do not have an account, please click on the \"Sign Up Now\" link. Current as of: September 5, 2018  Content Version: 12.0  © 4243-0542 Healthwise, Incorporated. Care instructions adapted under license by [facility] ([facility]).  If you have questions about a medical condition or this instruction, always ask your healthcare professional. [employer], [employer] disclaims any warranty or liability for your use of this information. Managing Morning Sickness: Care Instructions  Your Care Instructions    For many women, the toughest part of early pregnancy is morning sickness. Morning sickness can range from mild nausea to severe nausea with bouts of vomiting. Symptoms may be worse in the morning, although they can strike at any time of the day or night. If you have nausea, vomiting, or both, look for safe measures that can bring you relief. You can take simple steps at home to manage morning sickness. These steps include changing what and when you eat and avoiding certain foods and smells. Some women find that acupuncture and acupressure wristbands also help. Follow-up care is a key part of your treatment and safety. Be sure to make and go to all appointments, and call your doctor if you are having problems. It's also a good idea to know your test results and keep a list of the medicines you take. How can you care for yourself at home? · Keep food in your stomach, but not too much at once. Your nausea may be worse if your stomach is empty. Eat five or six small meals a day instead of three large meals. · For morning nausea, eat a small snack, such as a couple of crackers or dry biscuits, before rising. Allow a few minutes for your stomach to settle before you get out of bed slowly. · Drink plenty of fluids, enough so that your urine is light yellow or clear like water. If you have kidney, heart, or liver disease and have to limit fluids, talk with your doctor before you increase the amount of fluids you drink. Some women find that peppermint tea helps with nausea. · Eat more protein, such as chicken, fish, lean meat, beans, nuts, and seeds. · Eat carbohydrate foods, such as potatoes, whole-grain cereals, rice, and pasta. · Avoid smells and foods that make you feel nauseated.  Spicy or high-fat foods, citrus juice, milk, coffee, and tea with caffeine often make nausea worse. · Do not drink alcohol. · Do not smoke. Try not to be around others who smoke. If you need help quitting, talk to your doctor about stop-smoking programs and medicines. These can increase your chances of quitting for good. · If you are taking iron supplements, ask your doctor if they are necessary. Iron can make nausea worse. · Get lots of rest. Stress and fatigue can make your morning sickness worse. · Ask your doctor about taking prescription medicine, or over-the-counter products such as vitamin B6, doxylamine, or linda, to relieve your symptoms. Your doctor can tell you the doses that are safe for you. · Take your prenatal vitamins at night on a full stomach. When should you call for help? Call 911 anytime you think you may need emergency care. For example, call if:    · You passed out (lost consciousness). Call your doctor now or seek immediate medical care if:    · You are sick to your stomach or cannot drink fluids. · You have symptoms of dehydration, such as:  ? Dry eyes and a dry mouth. ? Passing only a little urine. ? Feeling thirstier than usual.     · You are not able to keep down your medicine. · You have pain in your belly or pelvis. Watch closely for changes in your health, and be sure to contact your doctor if:    · You do not get better as expected. Where can you learn more? Go to https://ClaimSyncpejennifereb.RadPad. org and sign in to your [employer] account. Enter C615 in the [facility] box to learn more about \"Managing Morning Sickness: Care Instructions. \"     If you do not have an account, please click on the \"Sign Up Now\" link. Current as of: September 5, 2018  Content Version: 12.0  © 3578-6706 Healthwise, Incorporated. Care instructions adapted under license by [facility] ([facility]).  If you have questions about a medical condition or this instruction, always ask your healthcare professional. [address] any warranty or liability for your use of this information. Breastfeeding: Care Instructions  Overview      Breastfeeding has many benefits. It may lower your baby's chances of getting an infection. It also may make it less likely that your baby will have problems such as diabetes and obesity later in life. Breastfeeding also helps you bond with your baby. In the first days after birth, your breasts make a thick, yellow liquid called colostrum. This liquid gives your baby nutrients and antibodies against infection. It is all that babies need in the first days after birth. Your breasts will fill with milk a few days after the birth. Breastfeeding is a skill that gets better with practice. Be patient with yourself and your baby. If you have trouble, you can get help and keep breastfeeding. Follow-up care is a key part of your treatment and safety. Be sure to make and go to all appointments, and call your doctor if you are having problems. It's also a good idea to know your test results and keep a list of the medicines you take. How can you care for yourself at home? · Breastfeed your baby whenever he or she is hungry. In the first 2 weeks, your baby will feed about every 1 to 3 hours. That often works out to about 8 to 12 times in a 24-hour period. This will help you keep up your supply of milk. Signs that your baby is hungry include:  ? Sucking on his or her hands. ? Bolton his or her lips. ? Turning his or her head toward your breast.  · Put a bed pillow or a nursing pillow on your lap to support your arms and your baby. · Hold your baby in a comfortable position. ? You can hold your baby in several ways. One of the most common positions is the cradle hold. One arm supports your baby, with his or her head in the bend of your elbow. Your open hand supports your baby's bottom or back. Your baby's belly lies against yours. ? If you had your baby by , or , try the football hold.  This position keeps your baby off your belly. Tuck your baby under your arm, with his or her body along the side you will be feeding on. Support your baby's upper body with your arm. With that hand you can control your baby's head to bring his or her mouth to your breast.  ? Try different positions with each feeding. If you are having problems, ask for help from your doctor or a lactation consultant. · To get your baby to latch on:  ? Support and narrow your breast with one hand using a \"U hold,\" with your thumb on the outer side of your breast and your fingers on the inner side. You can also use a \"C hold,\" with all your fingers below the nipple and your thumb above it. Try the different holds to get the deepest latch for whichever breastfeeding position you use. Your other arm is behind your baby's back, with your hand supporting the base of the baby's head. Position your fingers and thumb to point toward your baby's ears. ? You can touch your baby's lower lip with your nipple to get your baby to open his or her mouth. Wait until your baby opens up really wide, like a big yawn. Then be sure to bring the baby quickly to your breastnot your breast to the baby. As you bring your baby toward your breast, use your other hand to support the breast and guide it into his or her mouth. ? Both the nipple and a large portion of the darker area around the nipple (areola) should be in the baby's mouth. The baby's lips should be flared outward, not folded in (inverted). ? Listen for a regular sucking and swallowing pattern while the baby is feeding. If you cannot see or hear a swallowing pattern, watch the baby's ears, which will wiggle slightly when the baby swallows. If the baby's nose appears to be blocked by your breast, bring your baby's body closer to you. This will help tilt the baby's head back slightly, so just the edge of one nostril is clear for breathing. ?  When your baby is latched, you can usually remove your Up Now\" link. Current as of: September 5, 2018  Content Version: 12.0  © 4612-1741 [employer]. Care instructions adapted under license by [facility] ([facility]). If you have questions about a medical condition or this instruction, always ask your healthcare professional. [address] any warranty or liability for your use of this information. Learning About Birth Defects Testing  What is birth defects testing? Birth defects testing is done during pregnancy to look for possible problems with the baby (fetus). A birth defect may have only a minor impact on a child's life. Or it can have a major effect on quality or length of life. You and your doctor can choose from many tests. You may have no tests, one test, or many tests. Talking with a genetic counselor may help you make decisions about testing. The counselor is trained to help you understand these tests. He or she can also help you find resources for support. What are the types of tests? You may have a screening test or a diagnostic test. Or you may have both types of tests. Screening tests show the chance that a baby has a certain birth defect, such as Down syndrome, spina bifida, or trisomy 25. Diagnostic tests show if a baby has a certain birth defect. · Screening tests and when they are done:  ? Blood tests at 10 to 13 weeks (first trimester)  ? Cell free fetal DNA test at 10 weeks or later (first trimester)  ? Nuchal translucency test at 11 to 14 weeks (first trimester)  ? Triple or quad screening at 15 to 20 weeks (second trimester)  ? Ultrasound at 18 to 20 weeks (second trimester)  · Diagnostic tests and when they are done:  ? Chorionic villus sampling (CVS) at 10 to 13 weeks (first trimester)  ? Amniocentesis at 13 to 20 weeks (second trimester)  In some cases, the doctors look at the combined screenings that you've had over a period of time. This is called an integrated screening.   What are the screening tests?  · Blood tests are done to look at different substances in your blood. These tests include cell free fetal DNA and triple or quadruple (quad) blood tests. Both of these tests can help find genetic problems. · Nuchal translucency test uses ultrasound to measure the thickness of the area at the back of the baby's neck. An increase in thickness can be an early sign of certain birth defects. Ultrasound is a tool that uses sound waves to make pictures of your baby and placenta inside the uterus. · Ultrasound lets your doctor see an image of your baby. It can help your doctor look for problems of the heart, spine, belly, or other areas. What are the diagnostic tests? Chorionic villus sampling (CVS) looks at cells from the placenta. To do the test, your doctor may put a thin tube through your vagina and cervix to take out a small piece of the placenta. Or the doctor may take out the piece through a needle in your belly. This test can diagnose many genetic diseases. But it can't find problems with the spinal cord. Amniocentesis looks at the amniotic fluid that surrounds your baby. Your doctor will put a needle through your belly into your uterus and take out a very small amount of fluid to test.  What are the risks of these tests? There is a small risk of a miscarriage after a CVS or amniocentesis. Your doctor or genetic counselor can help you understand this risk. These tests are generally very safe. Where can you learn more? Go to https://NuvoMedpeDoApp.Xochitl (So-Shee) Gold mines. org and sign in to your [employer] account. Enter G030 in the [facility] box to learn more about \"Learning About Birth Defects Testing. \"     If you do not have an account, please click on the \"Sign Up Now\" link. Current as of: September 5, 2018  Content Version: 12.0  © 2588-9753 Healthwise, Incorporated. Care instructions adapted under license by [facility] ([facility]).  If you have questions about a medical condition or this instruction, always ask your healthcare professional. [place] [address] any warranty or liability for your use of this information.

## 2021-08-05 NOTE — PROGRESS NOTES
OB History    Para Term  AB Living   4 2 2 0 1 2   SAB TAB Ectopic Molar Multiple Live Births   1 0 0   0 2      # Outcome Date GA Lbr Melvin/2nd Weight Sex Delivery Anes PTL Lv   4 Current            3 Term 20 38w0d  4 lb 6.1 oz (1.986 kg) M CS-LTranv Spinal N MANDY      Complications: IUGR (intrauterine growth restriction) affecting care of mother   2 SAB            1 Term 09 38w0d  5 lb 12 oz (2.608 kg) M CS-Unspec   MANDY      Joyce Balbuena is being seen today for her new obstetrical visit. The pregnancy is  a planned pregnancy. She is  accepting at this time. Her last period was Patient's last menstrual period was 2021 (approximate). .  This was not a sure and definite menses. She was not on OCP at conception. Gestation 9w6d  Estimated Date of Delivery: 3/4/22  Last PAP 2020- negative, hx abnormal: denies    Hx  X2 and hx IUGR with last pregnancy      Plans to deliver at: [address] to breastfeed: no  SO/Partner:  Katlyn Daoe- - he is father of second child not 1st  Involved:  yes  Patient Ethnicity:    FOB Ethnicity:         Occupation:  [employer] work outside the home. Pets:  dog    Teratogen exposure since LMP: (OTC/prescription/ETOH/drugs):  marijuania occasionally. Counseled on THC/marijuana use for N/V, prefer ACOG approved therapy and patient understands.           History of prior GBS-infected child:  no  Recent travel outside of country (partner also):  no    Known COVID/Zika exposure (partner also): no  Any history of genetic or chromosomal aberations in herself the father to be or their respective families:  Her last child son was born with VSD  Any histories of spina bifida or abdominal wall defects; omphalocele's or gastroschisis no  Hx Hypothyroidism: Hx low TSH with 2019 pregnancy  Hx preeclampsia or HTN:  denies  Hx PPD: denies  Hx Diabetes: denies  Hx GDM: yes- on insulin with last pregnancy  First degree relative with diabetes: denies    Had dating US on 21 d/t irregular periods- 7w3d and had [facility] today repeat US revealed resolution of [facility]. Allergies   Allergen Reactions    Arestin [Minocycline] Hives     No current outpatient medications on file. No current facility-administered medications for this visit. Past Medical History:   Diagnosis Date    Anxiety and depression 10/8/2020    Depression     Gastroesophageal reflux disease 10/8/2020    Gestational diabetes mellitus (GDM), antepartum 2019    IBS (irritable bowel syndrome)     Mental disorder     BIPOLAR    Pnctr w/o FB of r idx fngr w/o damage to nail, subs 2019    TSH deficiency 7/10/2019     Past Surgical History:   Procedure Laterality Date     SECTION       SECTION N/A 2020     SECTION performed by Linda Reina DO at [facility] L[facility]D OR         Swelling: no  Bleeding: no  Discharge: no   Dysuria: no  Nausea: yes -  encouraged small frequent meals, and vitamin B6 and unisom if needed. Heartburn: no  Epigastric pain: no    Her biggest complaint is significant fatigue. States just feels exhausted states will just be doing dishes and so tired. She denies being like this with previous pregnancies. Office protocol reviewed, After hours number provided. Diet and exercise reveiwed  Warning signs reviewed  Testing reviewed     Q&A  Discussed in detail referral/orders for  for 1st trimester screen vs NIPSinfo provided for screening  Discussed with patient that if they proceed with the NIPs testing then they will be opting out of 1st trimester screening which can provide information in regards to placental health, congenital heart defects, metabolic abnormalities, and anatomic abnormalities. Patient is aware and has decided to: 1st trimester screen  Discussed dates and orders for Anatomy USd and fetal echo if indicated. Breastfeeding education.    She verbally consented to HIV testing and drug screening. She was counseled on Toxoplasmosis/Listeriosis (cats/raw meat). Prenatal Vitamins recommended. Prenatal labs and dating us ordered. Early 1 hr gtt after 13 week  Check iron studies/tsh d/t fatigue      RV prn/ 4 weeks     Of the 30 minute duration appointment visit, Martha Villatoro CNP spent at least 50% of the face-to-face time in counseling, explanation of diagnosis, planning of further management, and answering all questions.

## 2021-08-05 NOTE — PROGRESS NOTES
Last pap smear 10/2020    History of any abnormal pap smears NO, if yes when N/A  History of any STD/Viral infections? NO  Recent visit to ER/Hospital for this pregnancy? NO, if yes when and where? NO  Planned pregnancy? YES  FOB name/involved/ethnicity Yes, , Olman Antis  Date of last menstrual period uknown   On birth control at the time of conception ? NO  History of  section Yes if yes will need to obtain op note- where was this done/doctor? [facility] for both  History of vaginal deliveries NO any complications with deliveries ? Gestational diabetes, fluid    History of spontaneous  delivery? NO  Family history of chromosomal abnormalities? NO  Family history of diabetes? unsure  First Trimester screen- information given op to have testing? Yes info given  NIPS information given op to have testing? Yes info given  Smoke tobacco? Quit 8 mos ago if yes sign smoking consent? No, quit :)  Substance abuse history? Yes, marijuana occasionally   Alcohol use after last period? NO  Medications taken after last period? PNV gummies  House hold Cats?  NO, 1 dog   Office protocol/after office hours number explained/Ob bag given Yes  Prenatal labs/urine/referrals placed Yes

## 2021-08-06 ENCOUNTER — TELEPHONE (OUTPATIENT)
Dept: OBGYN CLINIC | Age: 31
End: 2021-08-06

## 2021-08-06 LAB
C. TRACHOMATIS DNA ,URINE: NEGATIVE
CULTURE: NORMAL
Lab: NORMAL
N. GONORRHOEAE DNA, URINE: NEGATIVE
SPECIMEN DESCRIPTION: NORMAL
SPECIMEN DESCRIPTION: NORMAL

## 2021-09-01 ENCOUNTER — ROUTINE PRENATAL (OUTPATIENT)
Dept: OBGYN CLINIC | Age: 31
End: 2021-09-01

## 2021-09-01 ENCOUNTER — HOSPITAL ENCOUNTER (OUTPATIENT)
Age: 31
Setting detail: SPECIMEN
Discharge: HOME | End: 2021-09-01
Payer: COMMERCIAL

## 2021-09-01 VITALS — WEIGHT: 117 LBS | DIASTOLIC BLOOD PRESSURE: 70 MMHG | SYSTOLIC BLOOD PRESSURE: 110 MMHG | BODY MASS INDEX: 21.4 KG/M2

## 2021-09-01 DIAGNOSIS — Z3A.09 9 WEEKS GESTATION OF PREGNANCY: ICD-10-CM

## 2021-09-01 DIAGNOSIS — Z98.891 HISTORY OF C-SECTION: ICD-10-CM

## 2021-09-01 DIAGNOSIS — Z86.32 HISTORY OF GESTATIONAL DIABETES: ICD-10-CM

## 2021-09-01 DIAGNOSIS — Z40.02 ENCOUNTER FOR PROPHYLACTIC SURGERY FOR RISK FACTOR RELATED TO MALIGNANT NEOPLASM OF OVARY: ICD-10-CM

## 2021-09-01 DIAGNOSIS — Z3A.13 13 WEEKS GESTATION OF PREGNANCY: ICD-10-CM

## 2021-09-01 DIAGNOSIS — Z91.89 OTHER SPECIFIED PERSONAL RISK FACTORS, NOT ELSEWHERE CLASSIFIED: ICD-10-CM

## 2021-09-01 DIAGNOSIS — Z34.92 NORMAL PREGNANCY IN SECOND TRIMESTER: Primary | ICD-10-CM

## 2021-09-01 DIAGNOSIS — Z34.91 NORMAL PREGNANCY IN FIRST TRIMESTER: ICD-10-CM

## 2021-09-01 LAB
GLUCOSE ADMINISTRATION: NORMAL
GLUCOSE TOLERANCE SCREEN 50G: 127 MG/DL (ref 70–135)

## 2021-09-01 PROCEDURE — 0502F SUBSEQUENT PRENATAL CARE: CPT | Performed by: OBSTETRICS & GYNECOLOGY

## 2021-09-01 NOTE — PROGRESS NOTES
Betzaida Jefferson is a 32 y.o. female 13w3d    [id number]    OB History    Para Term  AB Living   4 2 2 0 1 2   SAB TAB Ectopic Molar Multiple Live Births   1 0 0   0 2      # Outcome Date GA Lbr Melvin/2nd Weight Sex Delivery Anes PTL Lv   4 Current            3 Term 20 38w0d  4 lb 6.1 oz (1.986 kg) M CS-LTranv Spinal N MANDY      Complications: IUGR (intrauterine growth restriction) affecting care of mother   2 SAB 2019           1 Term 09 38w0d  5 lb 12 oz (2.608 kg) M CS-Unspec   MANDY             Vitals  BP: 110/70  Weight: 117 lb (53.1 kg)  Patient Position: Sitting  Albumin: Negative  Glucose: Negative  Fetal Heart Rate: 150      The patient was seen and evaluated. No contractions or leakage of fluid. Signs and symptoms of  labor as well as labor were reviewed. The Nuchal Translucency testing was reviewed and found to be ultrasound completed, Bryon Rodriguez MS in office talked with [facility] as blood work was not done and they stated they are no longer doing this and to offer patient NIPS. Discussed with patient as her son had VSD and offered referral to [facility] but she is declining at this time. . A single marker MSAFP was discussed for a 15-20 week gestational age window. TOP ST OH Reviewed. Dates were reviewed with the patient. An 18-22 week anatomy ultrasound has been ordered. The patient will return to the office for her next visit in 4 weeks. See antepartum flow sheet. History of csec x2  History of SAB in 2019  Pap done 10/2020  History of gestational diabetes- early 1 hour GTT ordered  Wants to deliver at [facility]- would like Dr. Eileen Monson to do it  FOB: Bert Salgado ()  FTS and anatomy scan prefers [facility]- son has history of VSD  1st trimester completed at [facility] MFM/bloodwork not done, per Juan Ivy at [facility] offer NIPS testing here per the Dr. Gareth Oh 1 hour completed 21        Assessment:  1. Betzaida Jefferson is a 32 y.o. female  2.  [id number]  3. 13w3d    Patient Active Problem List    Diagnosis Date Noted    Anxiety and depression 10/08/2020    Gastroesophageal reflux disease 10/08/2020    Postpartum state     RLTCS 20 M APG 9/ Wt 4#6 2020    Delivery with history of      Hx CS x1 2019    Abnormal urinalysis 2019: UA showed large Hgb in urine, microscopic UA showed  RBC. Rx Keflex given to patient, will need repeat UA. UCx pending       Contact with hypodermic needle 2019    Pnctr w/o FB of r idx fngr w/o damage to nail, subs 2019    IBS (irritable bowel syndrome)         Diagnosis Orders   1. Normal pregnancy in second trimester     2. 13 weeks gestation of pregnancy     3. History of gestational diabetes     4. History of            Plan:  Plan for repeat C/S  Anatomy ultrasound referral placed  Early 1hr gtt pending    Risk reduction Counseling for Primary Peritoneal/Ovarian Cancer: The patient was counseled on the risk factors that make her above the average risk for development of primary peritoneal/ovarian cancer. The patient was also counseled on the options of completing Risk Reduction Surgery with her upcoming pelvic gynecologic or abdominal surgery. The procedure with its associated risks, benefits, and alternatives were reviewed at length. Per the the recommendations from the Society of Gynecologic Oncology and the [address] [place] [address] of Obstetricians and Gynecologists, the patient had the procedure reviewed and was informed of the potential benefits of such a procedure. The primary benefit is a greater than 50% reduction in the future risk of development Primary Peritoneal/Ovarian cancer. She was informed that large scale studies have not shown an increase in the estimated blood loss, complications, or operating time. The patient was made aware that bleeding, infection, and injury to nearby organs are potential complications with this additional surgery.  The patient was also informed and had this reviewed in detail with her that once the risk reduction procedure is completed she will have lost the fertility opportunity, to conceive naturally, going forward and that any future conception would require reproductive assisted approaches; (IVF, GIFT,ZIFT)-all described in lay terms to the patient. The patient was counseled and understands that the loss of fertility can not be reversed. She voiced understanding of this and signed a written consent. She was also counseled that any future pregnancy (18-20/1000 cases annually), carries an added increase risk of ectopic pregnancy and the potential need for future surgery. The patient still wishes to proceed with the risk reduction surgery. The patient had explained to her that the completion of the procedure does not guarantee her that she will not be diagnosed with a future primary peritoneal or ovarian malignancy but her risks are greatly reduced. The patient had the procedure discussed with her at length and in detail. The risks and benefits of concurrent ovarian cancer risk reducing bilateral salpingectomy with the patient's upcoming scheduled abdominal or pelvic surgery. This patient is at above average risk for development of ovarian cancer. See risk factors below:      1. Yes    2. No  Age greater than 62 yo   3. No  Elevated BMI  4. No  Exposure to hormone therapy after menopause   5. No  A family history of Ovarian, Breast, Uterine or Colorectal cancer  6. No  Having a familial cancer syndrome (HBOC)  7. No  Having a positive genetic mutation predisposing the patient to Ovarian cancer risk elevation  8. No  History of in vitro/fertility treatments  9. Yes  Smoking  10. No  Medically indicated with Endometrial Ablation  11. Yes  Other personal risk factors not elsewhere specified  12. Yes  Other: Potential endometriosis based on clinical history.         I advised the patient that the primary benefit of such surgery is up to a 65% reduction in future risk of ovarian cancer. Finally, the patient has been thoroughly counseled regarding the consequence of loss of fertility following this procedure. The patient understands that this loss of fertility cannot be reversed and has expressed via verbal and written consent that her wishes are to proceed with this surgery for the purposes of reducing risk for ovarian cancer. Patient was seen with total face to face time of 20 minutes. More than 50% of this visit was on counseling and education regarding her    Diagnosis Orders   1. Normal pregnancy in second trimester     2. 13 weeks gestation of pregnancy     3. History of gestational diabetes     4. History of       and her options. She was also counseled on her preventative health maintenance recommendations and follow-up.

## 2021-10-04 ENCOUNTER — ROUTINE PRENATAL (OUTPATIENT)
Dept: OBGYN CLINIC | Age: 31
End: 2021-10-04

## 2021-10-04 ENCOUNTER — TELEPHONE (OUTPATIENT)
Dept: OBGYN CLINIC | Age: 31
End: 2021-10-04

## 2021-10-04 VITALS — WEIGHT: 116 LBS | BODY MASS INDEX: 21.22 KG/M2 | DIASTOLIC BLOOD PRESSURE: 62 MMHG | SYSTOLIC BLOOD PRESSURE: 110 MMHG

## 2021-10-04 DIAGNOSIS — Z34.92 NORMAL PREGNANCY IN SECOND TRIMESTER: Primary | ICD-10-CM

## 2021-10-04 DIAGNOSIS — Z3A.18 18 WEEKS GESTATION OF PREGNANCY: ICD-10-CM

## 2021-10-04 DIAGNOSIS — Z3A.19 19 WEEKS GESTATION OF PREGNANCY: ICD-10-CM

## 2021-10-04 PROCEDURE — 0502F SUBSEQUENT PRENATAL CARE: CPT | Performed by: OBSTETRICS & GYNECOLOGY

## 2021-10-04 NOTE — TELEPHONE ENCOUNTER
Patient states [address] [facility] did not order any labs when she saw them. Was asking if any labs [facility] usually would have ordered at the time of her visit there  should be ordered for her now?

## 2021-10-04 NOTE — PROGRESS NOTES
Diya Jacques is a 32 y.o. female 18w1d    [id number]    OB History    Para Term  AB Living   4 2 2 0 1 2   SAB TAB Ectopic Molar Multiple Live Births   1 0 0   0 2      # Outcome Date GA Lbr Melvin/2nd Weight Sex Delivery Anes PTL Lv   4 Current            3 Term 20 38w0d  4 lb 6.1 oz (1.986 kg) M CS-LTranv Spinal N MANDY      Complications: IUGR (intrauterine growth restriction) affecting care of mother   2 SAB            1 Term 09 38w0d  5 lb 12 oz (2.608 kg) M CS-Unspec   MANDY       Vitals  BP: 110/62  Weight: 116 lb (52.6 kg)  Patient Position: Sitting  Albumin: Negative  Glucose: Negative    The patient was seen and evaluated. There was positive fetal movements. No contractions or leakage of fluid. Signs and symptoms of  labor as well as labor were reviewed. The Nuchal Translucency testing was reviewed and found to be normal. The patients anatomy ultrasound has been scheduled and reviewed with patient. TOP  OH Reviewed. The S/S of Pre-Eclampsia were reviewed with the patient in detail. She is to report any of these if they occur. She currently denies any of these. The patient is RH positive Rhogam Ordered no    The patient was instructed on fetal kick counts and was given a kick sheet to complete every 8 hours. This is to begin at 28 weeks gestation. She was instructed that the baby should move at a minimum of ten times within one hour after a meal. The patient was instructed to lay down on her left side twenty minutes after eating and count movements for up to one hour with a target value of ten movements. She was instructed to notify the office if she did not make that target after two attempts or if after any attempt there was less than four movements.       History of csec x2  History of SAB in 2019  Pap done 10/2020  History of gestational diabetes- early 1 hour GTT ordered  Wants to deliver at [facility]- would like Dr. Juan M Hanley to do it  FOB: Luz Maria Long ()  FTS and anatomy scan prefers [facility]- son has history of VSD  1st trimester completed at [facility]/bloodwork not done, per Michelle King at [facility] offer NIPS testing here per the  (declined NIPS 9/1/21)  Early 1 hour completed 9/1/21-normal rpt at 28wk  Anatomy scan placed and faxed to [facility] 9/1/21 9/9/2021 rrBS submitted and pending approval- TS      Assessment:  1. Ciara Nair is a 32 y.o. female  2. [id number]  3. 18w1d    Patient Active Problem List    Diagnosis Date Noted    Other specified personal risk factors, not elsewhere classified 09/01/2021     Discussed at length the risks and benefits of concurrent ovarian cancer risk reducing bilateral salpingectomy with patient's upcoming scheduled abdominal or pelvic surgery as this patient is at average risk for development of ovarian cancer. Advised patient that the primary benefit of such surgery is up to a 65% reduction in future risk of ovarian cancer. Finally, patient has been thoroughly counseled regarding the consequence of the loss of fertility following this procedure. Patient understands that this loss of fertility cannot be reversed and has expressed via verbal and written consent that her wishes are to proceed with this surgery for the purposes of reducing risk for ovarian cancer.  Encounter for prophylactic surgery for risk factor related to malignant neoplasm of ovary 09/01/2021     Discussed at length the risks and benefits of concurrent ovarian cancer risk reducing bilateral salpingectomy with patient's upcoming scheduled abdominal or pelvic surgery as this patient is at average risk for development of ovarian cancer. Advised patient that the primary benefit of such surgery is up to a 65% reduction in future risk of ovarian cancer. Finally, patient has been thoroughly counseled regarding the consequence of the loss of fertility following this procedure.  Patient understands that this loss of fertility cannot be reversed and has expressed via verbal and written consent that her wishes are to proceed with this surgery for the purposes of reducing risk for ovarian cancer.  Anxiety and depression 10/08/2020    Gastroesophageal reflux disease 10/08/2020    Postpartum state     RLTCS 20 M APG  Wt 4#6 2020    Delivery with history of      Hx CS x1 2019    Abnormal urinalysis 2019: UA showed large Hgb in urine, microscopic UA showed  RBC. Rx Keflex given to patient, will need repeat UA. UCx pending       Contact with hypodermic needle 2019    Pnctr w/o FB of r idx fngr w/o damage to nail, subs 2019    IBS (irritable bowel syndrome)         Diagnosis Orders   1. Normal pregnancy in second trimester     2. 18 weeks gestation of pregnancy           Plan:  The patient will return to the office for her next visit in 4 weeks. See antepartum flow sheet. rrBS request sent  Anatomy ultrasound scheduled today TT        Patient was seen with total face to face time of 20 minutes. More than 50% of this visit was on counseling and education regarding her    Diagnosis Orders   1. Normal pregnancy in second trimester     2. 18 weeks gestation of pregnancy      and her options. She was also counseled on her preventative health maintenance recommendations and follow-up.

## 2021-10-08 ENCOUNTER — TELEPHONE (OUTPATIENT)
Dept: OBGYN CLINIC | Age: 31
End: 2021-10-08

## 2021-10-08 NOTE — TELEPHONE ENCOUNTER
The patient was seen at [facility] who no longer do the NIPS portion of the blood work for the 1st Trimester screen- pt just had her anatomy and stated our office could order the blood work that looks for down syndrome- stated I would confirm which testing is needed     Does patient need one or both     Quad Screen  AFP   NIPS

## 2021-10-12 ENCOUNTER — HOSPITAL ENCOUNTER (OUTPATIENT)
Age: 31
Setting detail: SPECIMEN
Discharge: HOME | End: 2021-10-12
Payer: COMMERCIAL

## 2021-10-12 DIAGNOSIS — Z3A.19 19 WEEKS GESTATION OF PREGNANCY: ICD-10-CM

## 2021-10-12 DIAGNOSIS — Z34.92 NORMAL PREGNANCY IN SECOND TRIMESTER: ICD-10-CM

## 2021-10-12 NOTE — TELEPHONE ENCOUNTER
Pt aware of lab and recommendations and what is it looking for- pt aware has to be completed prior to 20 weeks- will  lab slip and go to the lab across the [place] to complete.

## 2021-10-15 LAB
AFP INTERPRETATION: NORMAL
AFP MOM: 1.31
AFP SPECIMEN: NORMAL
AFP: 83 NG/ML
DATE OF BIRTH: NORMAL
DATING METHOD: NORMAL
DETERMINED BY: NORMAL
DIABETIC: NO
DONOR EGG?: NORMAL
DUE DATE: NORMAL
ESTIMATED DUE DATE: NORMAL
FAMILY HISTORY NTD: NO
GESTATIONAL AGE: NORMAL
IN VITRO FERTILIZATION: NORMAL
INSULIN REQ DIABETES: NO
LAST MENSTRUAL PERIOD: NORMAL
MATERNAL AGE AT EDD: 32 YR
MATERNAL WEIGHT: 116
MONOCHORIONIC TWINS: NORMAL
NUMBER OF FETUSES: NORMAL
PATIENT WEIGHT UNITS: NORMAL
PATIENT WEIGHT: NORMAL
RACE (MATERNAL): NORMAL
RACE: NORMAL
REPEAT SPECIMEN?: NO
SMOKING: NORMAL
SMOKING: NORMAL
VALPROIC/CARBAMAZEP: NORMAL
ZZ NTE CLEAN UP: HISTORY: NO

## 2021-10-20 ENCOUNTER — OFFICE VISIT (OUTPATIENT)
Dept: PEDIATRIC CARDIOLOGY | Age: 31
End: 2021-10-20
Payer: COMMERCIAL

## 2021-10-20 ENCOUNTER — HOSPITAL ENCOUNTER (OUTPATIENT)
Dept: NON INVASIVE DIAGNOSTICS | Age: 31
Discharge: HOME | End: 2021-10-20
Payer: COMMERCIAL

## 2021-10-20 DIAGNOSIS — O35.BXX0 PREGNANCY COMPLICATED BY FETAL CONGENITAL HEART DISEASE, SINGLE OR UNSPECIFIED FETUS: Primary | ICD-10-CM

## 2021-10-20 PROCEDURE — 76827 ECHO EXAM OF FETAL HEART: CPT | Performed by: PEDIATRICS

## 2021-10-20 PROCEDURE — 93320 DOPPLER ECHO COMPLETE: CPT | Performed by: PEDIATRICS

## 2021-10-20 PROCEDURE — 93303 ECHO TRANSTHORACIC: CPT | Performed by: PEDIATRICS

## 2021-10-20 PROCEDURE — 93325 DOPPLER ECHO COLOR FLOW MAPG: CPT | Performed by: PEDIATRICS

## 2021-10-20 PROCEDURE — 76825 ECHO EXAM OF FETAL HEART: CPT | Performed by: PEDIATRICS

## 2021-10-20 PROCEDURE — 99204 OFFICE O/P NEW MOD 45 MIN: CPT | Performed by: PEDIATRICS

## 2021-10-20 NOTE — PROGRESS NOTES
I had the opportunity to evaluate Catrachita Yarbrough for an initial consultation per your request in the fetal cardiology clinic on 10/20/2021. As you know, Catrachita Yarbrough is a 32 y.o. mother who presents at 25 weeks gestation for evaluation of fetal congenital heart disease. According to the patient, one of her sons had perimembranous ventricular septal defect s/p surgical repair. Therefore, she was requested for fetal cardiac evaluation for the prognancy. Her current pregnancy, however, has remained uncomplicated. There has been no history of gestational diabetes, episodes of vaginal bleeding, infection, or placental compromise. MATERNAL MEDICAL HISTORY:  Kenny Issa  has a past medical history of Anxiety and depression, Depression, Gastroesophageal reflux disease, Gestational diabetes mellitus (GDM), antepartum, IBS (irritable bowel syndrome), Mental disorder, Pnctr w/o FB of r idx fngr w/o damage to nail, subs, and TSH deficiency. .  She  has a past surgical history that includes  section () and  section (N/A, 2020). .    Current Outpatient Medications:     Prenatal Vit w/Dt-Hdonfzfmg-KQ (PNV PO), Take by mouth, Disp: , Rfl: . Allergies   Allergen Reactions    Arestin [Minocycline] Hives     She denies any tobacco or cigarette smoking, alcohol, or illicit or illegal drug use. FAMILY HISTORY:  Family history is positive for perimembranous VSD that is positive in one of her children. PHYSICAL EXAMINATION:  Examination revealed a younger woman, in no apparent distress, lying in the supine position, who is comfortable. No JVD is seen. There is no evidence for dependent edema. Her skin appears normal, without rashes lesion, or jaundice. She is breathing comfortably and does not have audible breath sounds or stridor noted. Cardiac exam reveals normal S1 and S2, normal rhythm and rate, no murmur, gallop, rub and click.      FETAL EXAMINATION:  Fetal examination utilizing the [facility] with the curvilinear transducer. Findings:   Situs/Connections: There is atrial situs solitus with atrioventriuclar concordance and ventriculoarterial concordance. Pulmonary Veins: At least two pulmonary veins drain into the left atrium. Systemic Veins: The SVC and IVC drain normally to the right atrium. Atrial Septum: There is a normal flap of septum primum seen in the left atrium, and right to left shunting. Atria: Both atria appear normal.  AV Valves: Normal mitral and tricuspid valves without stenosis or regurgitation. Ventricular Septum: There are no ventricular level shunts. Ventricles: The biventricular size and ventricular systolic function is normal.  Aortic Valve: Normal aortic valve structure and function without stenosis or regurgitation. Pulmonic Valve: Pulmonic valve is structurally normal. No evidence of pulmonic valve stenosis. No evidence of any pulmonic regurgitation. Aorta: The ascending, transverse and descending aorta appear left-sided with no evidence of dilation, coarctation or aneurysm. Pulmonary Arteries: Ductal arch is patent without obstruction. The main and branch pulmonary arteries appear(s) normal.    DIAGNOSES:  1. Normal fetal cardiac anatomy  2. Normal fetal biventricular size and systolic function         a) Heart to chest ration = 26%  3. Normal cardiac rhythm         a) Fetal heart rate 145 bpm  4. No evidence of congenital heart defects. This is a fetal echocardiogram performed at 20 1/2 weeks gestation, the study reveals a single fetus in the cephalic position. RECOMMENDATIONS   1. She should be able to deliver at [address] or [facility] per her [facility] discretion  2. Further fetal Cardiology follow up with fetal echocardiography is not recommended unless new concerns arise. 3. Pediatric cardiology follow up when the baby is 34 weeks old    David David5:    It is my impression that Kian Mckeon is a 32 y.o. female who has a son with perimembranous VSD required for surgical repair and presents at 21 weeks gestation for evaluation of fetal congenital heart disease. I performed fetal echo during today's visit, the fetal ECHO demonstrated normal fetal cardiac structure, function and rhythm. Therefore, I don't think that the fetus has any significant cardiac defects that can cause any hemodynamic issue and affect fetal development.  Findings and the limitations of fetal echocardiography (ie, inability to conclusively rule out small ventricular septal defects, mild coarctation of the aorta, or mild valve anomalies) were also reviewed with the patient. My recommendations are listed above.       Utilizing over 50% of the appointment time in a face-to-face discussion which was approximately one hour in length, I reviewed today's results with the patient. Her questions were answered. She understands and agrees. Thank you for allowing us to share in the care of this wonderful patient. If you have anything further, please do not hesitate to call.       Sincerely,    Nia Vazquez MD. PhD     [facility]  Division of Pediatric Cardiology

## 2021-10-20 NOTE — LETTER
[facility]  [address]  [address] [address]  Phone: [phone]  Fax: [phone]    Luis Angel Fletcher MD    2021     Juice Valentin MD  No address on file    Patient: Kian Mckeon   MR Number: [id number]   YOB: 1990   Date of Visit: 10/20/2021       Dear Juice Valentin:    Thank you for referring Mati Faust to me for evaluation/treatment. Below are the relevant portions of my assessment and plan of care. I had the opportunity to evaluate Kian Mckeon for an initial consultation per your request in the fetal cardiology clinic on 10/20/2021. As you know, Kian Mckeon is a 32 y.o. mother who presents at 25 weeks gestation for evaluation of fetal congenital heart disease. According to the patient, one of her sons had perimembranous ventricular septal defect s/p surgical repair. Therefore, she was requested for fetal cardiac evaluation for the prognancy. Her current pregnancy, however, has remained uncomplicated. There has been no history of gestational diabetes, episodes of vaginal bleeding, infection, or placental compromise. MATERNAL MEDICAL HISTORY:  Adrianne Donnelly  has a past medical history of Anxiety and depression, Depression, Gastroesophageal reflux disease, Gestational diabetes mellitus (GDM), antepartum, IBS (irritable bowel syndrome), Mental disorder, Pnctr w/o FB of r idx fngr w/o damage to nail, subs, and TSH deficiency. .  She  has a past surgical history that includes  section () and  section (N/A, 2020). .    Current Outpatient Medications:     Prenatal Vit w/Il-Naggdyndk-JN (PNV PO), Take by mouth, Disp: , Rfl: . Allergies   Allergen Reactions    Arestin [Minocycline] Hives     She denies any tobacco or cigarette smoking, alcohol, or illicit or illegal drug use.       FAMILY HISTORY:  Family history is positive for perimembranous VSD that is positive in one of her children. PHYSICAL EXAMINATION:  Examination revealed a younger woman, in no apparent distress, lying in the supine position, who is comfortable. No JVD is seen. There is no evidence for dependent edema. Her skin appears normal, without rashes lesion, or jaundice. She is breathing comfortably and does not have audible breath sounds or stridor noted. Cardiac exam reveals normal S1 and S2, normal rhythm and rate, no murmur, gallop, rub and click. FETAL EXAMINATION:  Fetal examination utilizing the [facility] with the curvilinear transducer. Findings:   Situs/Connections: There is atrial situs solitus with atrioventriuclar concordance and ventriculoarterial concordance. Pulmonary Veins: At least two pulmonary veins drain into the left atrium. Systemic Veins: The SVC and IVC drain normally to the right atrium. Atrial Septum: There is a normal flap of septum primum seen in the left atrium, and right to left shunting. Atria: Both atria appear normal.  AV Valves: Normal mitral and tricuspid valves without stenosis or regurgitation. Ventricular Septum: There are no ventricular level shunts. Ventricles: The biventricular size and ventricular systolic function is normal.  Aortic Valve: Normal aortic valve structure and function without stenosis or regurgitation. Pulmonic Valve: Pulmonic valve is structurally normal. No evidence of pulmonic valve stenosis. No evidence of any pulmonic regurgitation. Aorta: The ascending, transverse and descending aorta appear left-sided with no evidence of dilation, coarctation or aneurysm. Pulmonary Arteries: Ductal arch is patent without obstruction. The main and branch pulmonary arteries appear(s) normal.    DIAGNOSES:  1. Normal fetal cardiac anatomy  2. Normal fetal biventricular size and systolic function         a) Heart to chest ration = 26%  3. Normal cardiac rhythm         a) Fetal heart rate 145 bpm  4. No evidence of congenital heart defects.   This is a

## 2021-11-01 ENCOUNTER — TELEPHONE (OUTPATIENT)
Dept: OBGYN CLINIC | Age: 31
End: 2021-11-01

## 2021-11-01 NOTE — TELEPHONE ENCOUNTER
I guess that depends on their definition of high risk. She has eccentric umbilical cord insertion as well as history of c/s x2. There is definitely risk in the pregnancy. I don't know what criteria insurance uses to decide if it is high risk, but I guess to be on safe side we can call it high risk.

## 2021-11-03 ENCOUNTER — ROUTINE PRENATAL (OUTPATIENT)
Dept: OBGYN CLINIC | Age: 31
End: 2021-11-03

## 2021-11-03 VITALS — BODY MASS INDEX: 21.22 KG/M2 | SYSTOLIC BLOOD PRESSURE: 90 MMHG | WEIGHT: 116 LBS | DIASTOLIC BLOOD PRESSURE: 60 MMHG

## 2021-11-03 DIAGNOSIS — Z34.92 NORMAL PREGNANCY IN SECOND TRIMESTER: Primary | ICD-10-CM

## 2021-11-03 DIAGNOSIS — Z98.891 HISTORY OF CESAREAN DELIVERY: ICD-10-CM

## 2021-11-03 DIAGNOSIS — Z3A.22 22 WEEKS GESTATION OF PREGNANCY: ICD-10-CM

## 2021-11-03 PROCEDURE — 0502F SUBSEQUENT PRENATAL CARE: CPT | Performed by: OBSTETRICS & GYNECOLOGY

## 2021-11-03 NOTE — PROGRESS NOTES
procedure. Patient understands that this loss of fertility cannot be reversed and has expressed via verbal and written consent that her wishes are to proceed with this surgery for the purposes of reducing risk for ovarian cancer.  Encounter for prophylactic surgery for risk factor related to malignant neoplasm of ovary 2021     Discussed at length the risks and benefits of concurrent ovarian cancer risk reducing bilateral salpingectomy with patient's upcoming scheduled abdominal or pelvic surgery as this patient is at average risk for development of ovarian cancer. Advised patient that the primary benefit of such surgery is up to a 65% reduction in future risk of ovarian cancer. Finally, patient has been thoroughly counseled regarding the consequence of the loss of fertility following this procedure. Patient understands that this loss of fertility cannot be reversed and has expressed via verbal and written consent that her wishes are to proceed with this surgery for the purposes of reducing risk for ovarian cancer.  Anxiety and depression 10/08/2020    Gastroesophageal reflux disease 10/08/2020    Postpartum state     RLTCS 20 M APG  Wt 4#6 2020    Delivery with history of      Hx CS x1 2019    Abnormal urinalysis 2019: UA showed large Hgb in urine, microscopic UA showed  RBC. Rx Keflex given to patient, will need repeat UA. UCx pending       Contact with hypodermic needle 2019    Pnctr w/o FB of r idx fngr w/o damage to nail, subs 2019    IBS (irritable bowel syndrome)         Diagnosis Orders   1. Normal pregnancy in second trimester  Glucose tolerance, 1 hour    CBC Auto Differential    Culture, Urine    Urinalysis   2. 22 weeks gestation of pregnancy  Glucose tolerance, 1 hour    CBC Auto Differential    Culture, Urine    Urinalysis   3.  Hx CS x1  Glucose tolerance, 1 hour    CBC Auto Differential    Culture, Urine Urinalysis         Plan:  The patient will return to the office for her next visit in 4 weeks. See antepartum flow sheet.

## 2021-11-30 ENCOUNTER — HOSPITAL ENCOUNTER (OUTPATIENT)
Age: 31
Setting detail: SPECIMEN
Discharge: HOME | End: 2021-11-30

## 2021-11-30 DIAGNOSIS — Z98.891 HISTORY OF CESAREAN DELIVERY: ICD-10-CM

## 2021-11-30 DIAGNOSIS — Z3A.22 22 WEEKS GESTATION OF PREGNANCY: ICD-10-CM

## 2021-11-30 DIAGNOSIS — Z34.92 NORMAL PREGNANCY IN SECOND TRIMESTER: ICD-10-CM

## 2021-11-30 LAB
ABSOLUTE EOS #: 0.07 K/UL (ref 0–0.44)
ABSOLUTE IMMATURE GRANULOCYTE: 0.11 K/UL (ref 0–0.3)
ABSOLUTE LYMPH #: 2.26 K/UL (ref 1.1–3.7)
ABSOLUTE MONO #: 0.95 K/UL (ref 0.1–1.2)
BASOPHILS # BLD: 0 % (ref 0–2)
BASOPHILS ABSOLUTE: 0.04 K/UL (ref 0–0.2)
BILIRUBIN URINE: NEGATIVE
COLOR: YELLOW
COMMENT UA: NORMAL
DIFFERENTIAL TYPE: ABNORMAL
EOSINOPHILS RELATIVE PERCENT: 0 % (ref 1–4)
GLUCOSE ADMINISTRATION: ABNORMAL
GLUCOSE TOLERANCE SCREEN 50G: 172 MG/DL (ref 70–135)
GLUCOSE URINE: NEGATIVE
HCT VFR BLD CALC: 35.4 % (ref 36.3–47.1)
HEMOGLOBIN: 11.2 G/DL (ref 11.9–15.1)
IMMATURE GRANULOCYTES: 1 %
KETONES, URINE: NEGATIVE
LEUKOCYTE ESTERASE, URINE: NEGATIVE
LYMPHOCYTES # BLD: 14 % (ref 24–43)
MCH RBC QN AUTO: 30.4 PG (ref 25.2–33.5)
MCHC RBC AUTO-ENTMCNC: 31.6 G/DL (ref 28.4–34.8)
MCV RBC AUTO: 95.9 FL (ref 82.6–102.9)
MONOCYTES # BLD: 6 % (ref 3–12)
NITRITE, URINE: NEGATIVE
NRBC AUTOMATED: 0 PER 100 WBC
PDW BLD-RTO: 14.3 % (ref 11.8–14.4)
PH UA: 7.5 (ref 5–8)
PLATELET # BLD: 264 K/UL (ref 138–453)
PLATELET ESTIMATE: ABNORMAL
PMV BLD AUTO: 10.7 FL (ref 8.1–13.5)
PROTEIN UA: NEGATIVE
RBC # BLD: 3.69 M/UL (ref 3.95–5.11)
RBC # BLD: ABNORMAL 10*6/UL
SEG NEUTROPHILS: 79 % (ref 36–65)
SEGMENTED NEUTROPHILS ABSOLUTE COUNT: 13.32 K/UL (ref 1.5–8.1)
SPECIFIC GRAVITY UA: 1.02 (ref 1–1.03)
TURBIDITY: CLEAR
URINE HGB: NEGATIVE
UROBILINOGEN, URINE: NORMAL
WBC # BLD: 16.8 K/UL (ref 3.5–11.3)
WBC # BLD: ABNORMAL 10*3/UL

## 2021-12-01 ENCOUNTER — ROUTINE PRENATAL (OUTPATIENT)
Dept: OBGYN CLINIC | Age: 31
End: 2021-12-01
Payer: COMMERCIAL

## 2021-12-01 VITALS — BODY MASS INDEX: 22.13 KG/M2 | SYSTOLIC BLOOD PRESSURE: 102 MMHG | WEIGHT: 121 LBS | DIASTOLIC BLOOD PRESSURE: 60 MMHG

## 2021-12-01 DIAGNOSIS — Z3A.26 26 WEEKS GESTATION OF PREGNANCY: ICD-10-CM

## 2021-12-01 DIAGNOSIS — Z23 FLU VACCINE NEED: ICD-10-CM

## 2021-12-01 DIAGNOSIS — O99.810 ABNORMAL GLUCOSE TOLERANCE IN PREGNANCY: ICD-10-CM

## 2021-12-01 DIAGNOSIS — Z34.92 NORMAL PREGNANCY IN SECOND TRIMESTER: Primary | ICD-10-CM

## 2021-12-01 LAB
CULTURE: NO GROWTH
Lab: NORMAL
SPECIMEN DESCRIPTION: NORMAL

## 2021-12-01 PROCEDURE — 90471 IMMUNIZATION ADMIN: CPT | Performed by: NURSE PRACTITIONER

## 2021-12-01 PROCEDURE — 90674 CCIIV4 VAC NO PRSV 0.5 ML IM: CPT | Performed by: NURSE PRACTITIONER

## 2021-12-01 PROCEDURE — 0502F SUBSEQUENT PRENATAL CARE: CPT | Performed by: NURSE PRACTITIONER

## 2021-12-01 NOTE — PROGRESS NOTES
Presents for OB visit  Gestation 26w3d  Estimated Date of Delivery: 3/6/22    History of csec x2  History of SAB in 2019  Wants to deliver at [facility]- would like Dr. Arlette Aguero to do it  FOB: Neetu Perez ()  FTS and anatomy scan prefers [facility]- son has history of VSD  1st trimester completed at [facility] MFM/bloodwork not done, per Oval Flax at [facility] offer NIPS testing here per the DrValentina (declined NIPS 9/1/21)  Normal msAFP  Postrior placenta, eccentric cord insertion  History of gestational diabetes - Early 1 hour completed 9/1/21-normal rpt at 28wk  11/30/21 1 hour - 3 hour GTT Pending   9/9/2021 and again on 12/1/21 rrBS submitted and pending approval- TS  Knows gender- having girl :)       IMPRESSION:      1. Single intrauterine pregnancy with expected interval    fetal growth from the previous ultrasound.    2. Fetal anatomic survey and fetal heart echo did not    reveal sonographic evidence of any gross structural    abnormalities.    3. Previously documented low lying placenta was not    evaluated on today's exam.    4. Amniotic fluid volume assessment is normal.    Narrative  Performed by [facility]    ----------------------------------------------------------------------    OBSTETRICS REPORT                      (Signed Final 11/08/2021 12:16)   ----------------------------------------------------------------------   PATIENT INFO:      ID #:       [id number]                    D. O.B.:  [birth year] (31 yrs)(F)    Name:       Papa Marquez [facility]          Visit Date: 11/08/2021 11:17   ----------------------------------------------------------------------   PERFORMED BY:      Performed By:     Erica Simmons RDMS    Attending:       Anna Paredes MD    Referred By:     Romulo Banuelos.  Address:     [address] [place] [address]. [id number]   [place] [address], [address] [facility]    Location:         M[facility] Fetal [facility]   ----------------------------------------------------------------------   SERVICE(S) PROVIDED:       OB Follow-up, 1 fetus                                [id number]     Fetal Echocardiogram-complete                            ----------------------------------------------------------------------   INDICATIONS:       Screening for follow-up survey                 Z36. 2     History of pregnancy with other poor           O09.299     reproductive or obstetric history, antepartum     Prior child born with VSD     Smoking (tobacco) complicating pregnancy,      M51.399     second trimester     History of previous pregnancy with             O09.899, Z86.32     gestational diabetes     Prior                                 O34.21     Malformation of placenta, unspecified,         O43. 109     antepartum    ----------------------------------------------------------------------   VITAL SIGNS:      Weight (lb): 118    Height:      5'2\"                       BMI:             21.58     ----------------------------------------------------------------------   FETAL EVALUATION:      Num Of Fetuses:         1    Fetal Heart Rate(bpm):  219    Cardiac Activity:       Present & appears normal    Presentation:           Dung breech    Placenta:               Posterior,  low lying      Amniotic Fluid    RAYRAY FV:      Subjectively within normal limits   ----------------------------------------------------------------------   BIOMETRY:      BPD:      57.3  mm     G. Age:   23w 4d        60  %    OFD:      72.8  mm    HC:      207. 9  mm     G. Age:   22w 6d        25  %    AC:     [id number]. 4  mm     G. Age:   19w 3d        84  %    FL:       39.5  mm     G. Age:   22w 5d        25  %    HUM:      35.9  mm     G. Age:   22w 4d        28  %    LV:        6.5  mm    TIB:      34.8  mm     G. Age:   25w 0d        45  %    CI:          78. 7  %       70 - 86    FL/HC:       19.0  %       19.2 - 20.8    HC/AC:       1.04          1.05 - 1.21    FL/BPD:      68.9  %       71 - 87    FL/AC:       19.8  %       20 - 24      Est. FW:     618  gm      1 lb 6 oz      69  %   ----------------------------------------------------------------------   OB HISTORY:      :    4         Term:   2         SAB:   1    Livin   ----------------------------------------------------------------------   GESTATIONAL AGE:      LMP:           23w 1d      Date:  21           PERRY:   22    U/S Today:     23w 3d                                PERRY:   22    Best:          23w 1d   Det. By: Mirtha Montero  (21)    PERRY:   22   ----------------------------------------------------------------------   TARGETED ANATOMY:      Central Nervous System    Calvarium/Cranial V.:  Appears normal    Intracranial Chary:     Appears normal    Cavum:                 Appears normal    Parenchyma:            Appears Normal    Lateral Ventricles:    Appears normal    Choroid Plexus:        Previously seen    Cereb. /Vermis:         Appears normal    Cisterna Magna:        Previously seen    Midline Falx:          Previously seen      Spine    Cervical:              Previously seen    Thoracic:              Previously seen    Lumbar:                Previously seen    Sacral:                Previously seen    Shape/Curvature:       Previously seen      Head/Neck    Face:                  Appears normal    Lips:                  Appears normal    Neck:                  Previously seen    Nuchal Fold:           Previously seen    Nasal Bone:            Previously seen    Palate:                Appears normal    Profile:               Appears normal    Orbits/Eyes:           Previously seen    Mandible:              Appears Normal    Maxilla:               Appears normal      Thorax    Thoracic Contour:      Previously seen    Lungs:                 Previously seen    4 Chamber View:        Appears normal    Cardiac Motion:        Appears normal    Cardiac Rhythm:        Normal    Rt Outflow Tract:      Appears normal    Lt Outflow Tract:      Previously seen    Aortic Arch:           Previously seen    Ductal Arch:           Appears normal    SVC:                   Previously seen    Cardiac Axis:          Appears normal    Diaphragm:             Appears normal    3 Vessel View:         Previously seen    IVC:                   Previously seen    Crossing:              Appears normal      Abdomen    Ventral Wall:          Previously seen    Cord Insertion:        Previously seen    Situs:                 Previously seen    Stomach:               Appears normal    Lt Kidney:             Appears normal    Rt Kidney:             Appears normal    Bladder:               Appears normal    Bowel:                 Appears normal      Extremities    Lt Humerus:            Previously seen    Rt Humerus:            Previously seen    Lt Forearm:            Previously seen    Rt Forearm:            Previously seen    Lt Hand:               Previously seen    Rt Hand:               Previously seen    Lt Femur:              Previously seen    Rt Femur:              Previously seen    Lt Lower Leg:          Previously seen    Rt Lower Leg:          Previously seen    Lt Foot:               Previously seen    Rt Foot:               Appears normal      Other    Umbilical Cord:        Appears normal    Masses:                None visualized    Genitalia:             Previously seen   ----------------------------------------------------------------------   CERVIX UTERUS ADNEXA:      Cervix    Normal appearance by abdominal scan      Uterus    Gravid uterus      Left Ovary    Size(cm)     1.89   x   2.03   x  1.35      Vol(ml): 2.71    Visualized      Right Ovary    Not visualized      Adnexa    No adnexal masses identified   ----------------------------------------------------------------------   COMMENTS:      Ultrasound is not diagnostic for fetal chromosomal    abnormalities, will not detect all structural    abnormalities, and is not diagnostic for fetal genetic    disorders even if multiple exams are performed during    a given pregnancy.   ----------------------------------------------------------------------   ----------------------------------------------------------------------   RECOMMENDATIONS:      1. Patient is scheduled in 9 weeks a follow up growth    and transvaginal ultrasound to re evaluate placenta    location.    2. Subsequent follow up or other follow up as clinically    determined by primary OB provider unless otherwise    specified by MFM.   Fran Mueller. Results forwarded to ordering provider so they can    follow up with the patient as necessary. ----------------------------------------------------------------------                    Lisa Arce MD   Electronically Signed Final Report   11/08/2021 12:16   ----------------------------------------------------------------------    Procedure Note    Robert Bui MD - 11/08/2021   Formatting of this note might be different from the original.     ----------------------------------------------------------------------    OBSTETRICS REPORT                      (Signed Final 11/08/2021 12:16)   ----------------------------------------------------------------------   PATIENT INFO:      ID #:       [id number]                    D. O.B.:  [birth year] (31 yrs)(F)    Name:       Ravindra Eng [facility]          Visit Date: 11/08/2021 11:17   ----------------------------------------------------------------------   PERFORMED BY:      Performed By:     Josephine Jalloh RDMS    Attending:       Lisa Arce MD    Referred By:     Pete Mann DO    Ref.  Address:     [address] [facility] [id number]                     [facility] [address] [facility]    Location:         Maternal Fetal Medicine [facility]   ----------------------------------------------------------------------   SERVICE(S) PROVIDED:       OB Follow-up, 1 fetus                                [id number]     Fetal Echocardiogram-complete                        [id number]    ---------------------------------------------------------------------- INDICATIONS:       Screening for follow-up survey                 Z36.2     History of pregnancy with other poor           O09.299     reproductive or obstetric history, antepartum     Prior child born with VSD     Smoking (tobacco) complicating pregnancy,      N42.053     second trimester     History of previous pregnancy with             O09.899, Z86.32     gestational diabetes     Prior                                 O34.21     Malformation of placenta, unspecified,         O43. 109     antepartum    ----------------------------------------------------------------------   VITAL SIGNS:      Weight (lb): 118    Height:      5'2\"                       BMI:             21.58     ----------------------------------------------------------------------   FETAL EVALUATION:      Num Of Fetuses:         1    Fetal Heart Rate(bpm):  011    Cardiac Activity:       Present & appears normal    Presentation:           Dung breech    Placenta:               Posterior,  low lying      Amniotic Fluid    RAYRAY FV:      Subjectively within normal limits   ----------------------------------------------------------------------   BIOMETRY:      BPD:      57.3  mm     G. Age:   23w 4d        60  %    OFD:      72.8  mm    HC:      207. 9  mm     G. Age:   22w 6d        25  %    AC:     C447736. 4  mm     G. Age:   23w 1d        84  %    FL:       39.5  mm     G. Age:   22w 5d        25  %    HUM:      35.9  mm     G. Age:   22w 4d        28  %    LV:        6.5  mm    TIB:      34.8  mm     G. Age:   25w 0d        45  %    CI:          78. 7  %       70 - 86    FL/HC:       19.0  %       19.2 - 20.8    HC/AC:       1.04          1.05 - 1.21    FL/BPD:      68.9  %       71 - 87    FL/AC:       19.8  %       20 - 24      Est. FW:     618  gm      1 lb 6 oz      69  %   ----------------------------------------------------------------------   OB HISTORY:      :    4         Term:   2         SAB:   1    Livin ----------------------------------------------------------------------   GESTATIONAL AGE:      LMP:           23w 1d      Date:  05/30/21           PERRY:   03/06/22    U/S Today:     23w 3d                                PERRY:   03/04/22    Best:          23w 1d   Det. By: [employer]  (05/30/21)    PERRY:   03/06/22   ----------------------------------------------------------------------   TARGETED ANATOMY:      Central Nervous System    Calvarium/Cranial V.:  Appears normal    Intracranial Chary:     Appears normal    Cavum:                 Appears normal    Parenchyma:            Appears Normal    Lateral Ventricles:    Appears normal    Choroid Plexus:        Previously seen    Cereb. /Vermis:         Appears normal    Cisterna Magna:        Previously seen    Midline Falx:          Previously seen      Spine    Cervical:              Previously seen    Thoracic:              Previously seen    Lumbar:                Previously seen    Sacral:                Previously seen    Shape/Curvature:       Previously seen      Head/Neck    Face:                  Appears normal    Lips:                  Appears normal    Neck:                  Previously seen    Nuchal Fold:           Previously seen    Nasal Bone:            Previously seen    Palate:                Appears normal    Profile:               Appears normal    Orbits/Eyes:           Previously seen    Mandible:              Appears Normal    Maxilla:               Appears normal      Thorax    Thoracic Contour:      Previously seen    Lungs:                 Previously seen    4 Chamber View:        Appears normal    Cardiac Motion:        Appears normal    Cardiac Rhythm:        Normal    Rt Outflow Tract:      Appears normal    Lt Outflow Tract:      Previously seen    Aortic Arch:           Previously seen    Ductal Arch:           Appears normal    SVC:                   Previously seen    Cardiac Axis:          Appears normal    Diaphragm:             Appears normal    3 Vessel View:         Previously seen    IVC:                   Previously seen    Crossing:              Appears normal      Abdomen    Ventral Wall:          Previously seen    Cord Insertion:        Previously seen    Situs:                 Previously seen    Stomach:               Appears normal    Lt Kidney:             Appears normal    Rt Kidney:             Appears normal    Bladder:               Appears normal    Bowel:                 Appears normal      Extremities    Lt Humerus:            Previously seen    Rt Humerus:            Previously seen    Lt Forearm:            Previously seen    Rt Forearm:            Previously seen    Lt Hand:               Previously seen    Rt Hand:               Previously seen    Lt Femur:              Previously seen    Rt Femur:              Previously seen    Lt Lower Leg:          Previously seen    Rt Lower Leg:          Previously seen    Lt Foot:               Previously seen    Rt Foot:               Appears normal      Other    Umbilical Cord:        Appears normal    Masses:                None visualized    Genitalia:             Previously seen   ----------------------------------------------------------------------   CERVIX UTERUS ADNEXA:      Cervix    Normal appearance by abdominal scan      Uterus    Gravid uterus      Left Ovary    Size(cm)     1.89   x   2.03   x  1.35      Vol(ml): 2.71    Visualized      Right Ovary    Not visualized      Adnexa    No adnexal masses identified   ----------------------------------------------------------------------   COMMENTS:      Ultrasound is not diagnostic for fetal chromosomal    abnormalities, will not detect all structural    abnormalities, and is not diagnostic for fetal genetic    disorders even if multiple exams are performed during    a given pregnancy.   ---------------------------------------------------------------------- ----------------------------------------------------------------------   RECOMMENDATIONS:      1. Patient is scheduled in 9 weeks a follow up growth    and transvaginal ultrasound to re evaluate placenta    location.    2. Subsequent follow up or other follow up as clinically    determined by primary OB provider unless otherwise    specified by MFM.   Valentino Medina. Results forwarded to ordering provider so they can    follow up with the patient as necessary.    ----------------------------------------------------------------------                   Salo Parker MD   Electronically Signed Final Report   2021 12:16   ----------------------------------------------------------------------     IMPRESSION:   IMPRESSION:      1. Single intrauterine pregnancy with expected interval    fetal growth from the previous ultrasound.    2. Fetal anatomic survey and fetal heart echo did not    reveal sonographic evidence of any gross structural    abnormalities.    3. Previously documented low lying placenta was not    evaluated on today's exam.    4. Amniotic fluid volume assessment is normal.  Specimen Collected: 21 11:17 AM Last Resulted: 21 12:16 PM   Received From: [facility]  Result Received: 21 10:50 AM                   OB History    Para Term  AB Living   4 2 2 0 1 2   SAB IAB Ectopic Molar Multiple Live Births   1 0 0   0 2      # Outcome Date GA Lbr Melvin/2nd Weight Sex Delivery Anes PTL Lv   4 Current            3 Term 20 38w0d  4 lb 6.1 oz (1.986 kg) M CS-LTranv Spinal N MANDY      Complications: IUGR (intrauterine growth restriction) affecting care of mother   2 SAB            1 Term 09 38w0d  5 lb 12 oz (2.608 kg) M CS-Unspec   MANDY            Allergies   Allergen Reactions    Arestin [Minocycline] Hives     Current Outpatient Medications   Medication Sig Dispense Refill    Prenatal Vit w/Dy-Tzfqfzckb-FR (PNV PO) Take by mouth       No current facility-administered medications for this visit. Past Medical History:   Diagnosis Date    Anxiety and depression 10/8/2020    Depression     Gastroesophageal reflux disease 10/8/2020    Gestational diabetes mellitus (GDM), antepartum 2019    IBS (irritable bowel syndrome)     Mental disorder     BIPOLAR    Pnctr w/o FB of r idx fngr w/o damage to nail, subs 2019    TSH deficiency 7/10/2019       Past Surgical History:   Procedure Laterality Date     SECTION       SECTION N/A 2020     SECTION performed by Ethan Bhat DO at [facility] L&D OR     Headaches: no  Swelling: no  Bleeding: no  Discharge: no   Dysuria: no  Nausea: no  Heartburn: no  Epigastric pain: no  Contractions: no  Leakage of fluid: no  + fetal movement       Return 4 WEEKS    Labs as indicated 3 hr GTT    PTL signs reviewed, if indicated  Kick Count Instructions given if indicated: The patient was instructed on fetal kick counts and was given a kick sheet to complete every 8 hours. This is to begin at 28 weeks gestation. She was instructed that the baby should move at a minimum of ten times within one hour after a meal. The patient was instructed to lay down on her left side twenty minutes after eating and count movements for up to one hour with a target value of ten movements. She was instructed to notify the office if she did not make that target after two attempts or if after any attempt there was less than four movements. After hour numbers reviewed , along with labor signs if indicated. Contractions/cramping between 5-7 minutes and persisting even with attempts of increased water and laying on side. Fluid leakage, bleeding      Of the 20 minute duration appointment visit, Andrea Wood CNP spent at least 50% of the face-to-face time in counseling, explanation of diagnosis, planning of further management, and answering all questions.

## 2021-12-01 NOTE — PROGRESS NOTES
The patient requested to have have the Flu vaccine. Consent obtained. Injection of 0.5mL given Right deltoid Intramuscular. Lot #:[id number]  EXP: 6/30/22  [address] [id number]  Patient tolerated well.

## 2021-12-01 NOTE — PATIENT INSTRUCTIONS

## 2021-12-02 DIAGNOSIS — Z3A.26 26 WEEKS GESTATION OF PREGNANCY: Primary | ICD-10-CM

## 2021-12-03 ENCOUNTER — HOSPITAL ENCOUNTER (OUTPATIENT)
Age: 31
Setting detail: SPECIMEN
Discharge: HOME | End: 2021-12-03

## 2021-12-03 DIAGNOSIS — O99.810 ABNORMAL GLUCOSE TOLERANCE IN PREGNANCY: ICD-10-CM

## 2021-12-03 LAB
3 HR GLUCOSE: 87 MG/DL (ref 65–139)
AMOUNT GLUCOSE GIVEN: 100 G
GLUCOSE FASTING: 80 MG/DL (ref 65–94)
GLUCOSE TOLERANCE TEST 1 HOUR: 175 MG/DL (ref 65–179)
GLUCOSE TOLERANCE TEST 2 HOUR: 153 MG/DL (ref 65–154)

## 2021-12-30 ENCOUNTER — ROUTINE PRENATAL (OUTPATIENT)
Dept: OBGYN CLINIC | Age: 31
End: 2021-12-30

## 2021-12-30 VITALS — BODY MASS INDEX: 22.31 KG/M2 | DIASTOLIC BLOOD PRESSURE: 60 MMHG | SYSTOLIC BLOOD PRESSURE: 90 MMHG | WEIGHT: 122 LBS

## 2021-12-30 DIAGNOSIS — Z34.93 NORMAL PREGNANCY IN THIRD TRIMESTER: Primary | ICD-10-CM

## 2021-12-30 DIAGNOSIS — Z3A.30 30 WEEKS GESTATION OF PREGNANCY: ICD-10-CM

## 2021-12-30 PROCEDURE — 0502F SUBSEQUENT PRENATAL CARE: CPT | Performed by: OBSTETRICS & GYNECOLOGY

## 2022-01-03 NOTE — PROGRESS NOTES
Jonathon Chavez is a 32 y.o. female 30w4d    [id number]    OB History    Para Term  AB Living   4 2 2 0 1 2   SAB IAB Ectopic Molar Multiple Live Births   1 0 0   0 2      # Outcome Date GA Lbr Melvni/2nd Weight Sex Delivery Anes PTL Lv   4 Current            3 Term 20 38w0d  4 lb 6.1 oz (1.986 kg) M CS-LTranv Spinal N MANDY      Complications: IUGR (intrauterine growth restriction) affecting care of mother   2 SAB            1 Term 09 38w0d  5 lb 12 oz (2.608 kg) M CS-Unspec   MANDY       Vitals  BP: 90/60  Weight: 122 lb (55.3 kg)  Patient Position: Sitting  Albumin: Negative  Glucose: Negative  Fetal Heart Rate: 145  Movement: Present      + FM  NO VB  NO LOF  NO CTX  No complaints or concerns today     History of csec x2  History of SAB in 2019  W[facility] to deliver at [facility]- would like Dr. Nerissa Solorio to do it  FOB: Annie Oneal ()  FTS and anatomy scan prefers [facility]- son has history of VSD  1st trimester completed at [facility] MFM/bloodwork not done, per Rola Alvarez at [facility] offer NIPS testing here per the  (declined NIPS 21)  Normal msAFP  Postrior placenta, eccentric cord insertion  History of gestational diabetes - Early 1 hour completed 21-normal rpt at 28wk  21 1 hour - 3 hour GTT Pending   12/3/21 rrBS Approved- see media section   2021 and again on 21 rrBS submitted and pending approval- TS  Knows gender- having girl :)   FLU VACCINE GIVEN      Assessment:  1Valentina Chavez is a 32 y.o. female  2. [id number]  3. 30w4d     Diagnosis Orders   1. Normal pregnancy in third trimester     2. 30 weeks gestation of pregnancy               Plan:  The patient will return to the office for her next visit in 2 weeks. See antepartum flow sheet.

## 2022-01-13 ENCOUNTER — ROUTINE PRENATAL (OUTPATIENT)
Dept: OBGYN CLINIC | Age: 32
End: 2022-01-13
Payer: COMMERCIAL

## 2022-01-13 VITALS — DIASTOLIC BLOOD PRESSURE: 64 MMHG | SYSTOLIC BLOOD PRESSURE: 112 MMHG | WEIGHT: 123 LBS | BODY MASS INDEX: 22.5 KG/M2

## 2022-01-13 DIAGNOSIS — Z23 NEED FOR TDAP VACCINATION: ICD-10-CM

## 2022-01-13 DIAGNOSIS — O35.HXX0 SHORT FEMUR OF FETUS ON PRENATAL ULTRASOUND: ICD-10-CM

## 2022-01-13 DIAGNOSIS — Z3A.32 32 WEEKS GESTATION OF PREGNANCY: ICD-10-CM

## 2022-01-13 DIAGNOSIS — Z34.93 NORMAL PREGNANCY IN THIRD TRIMESTER: Primary | ICD-10-CM

## 2022-01-13 PROCEDURE — 90471 IMMUNIZATION ADMIN: CPT | Performed by: NURSE PRACTITIONER

## 2022-01-13 PROCEDURE — 0502F SUBSEQUENT PRENATAL CARE: CPT | Performed by: NURSE PRACTITIONER

## 2022-01-13 PROCEDURE — 90715 TDAP VACCINE 7 YRS/> IM: CPT | Performed by: NURSE PRACTITIONER

## 2022-01-13 NOTE — PATIENT INSTRUCTIONS
than normal.    Watch closely for changes in your health, and be sure to contact your doctor if you have any problems. Where can you learn more? Go to https://chpepiceweb.[employer]. org and sign in to your [email or web address] account. Enter C216 in the Canal Internet box to learn more about \"Counting Your Baby's Kicks: Care Instructions. \"     If you do not have an account, please click on the \"Sign Up Now\" link. Current as of: September 5, 2018  Content Version: 12.0  © 3894-5012 [employer]. Care instructions adapted under license by [facility] ([facility]). If you have questions about a medical condition or this instruction, always ask your healthcare professional. [address] any warranty or liability for your use of this information. Preeclampsia: Care Instructions  Overview    Preeclampsia occurs when a woman's blood pressure rises during pregnancy. Often with preeclampsia, you also have swelling in your legs, hands, and face. A test may show too much protein in your urine. Preeclampsia is also called toxemia. If preeclampsia is severe and not treated, it can lead to seizures (eclampsia) and damage to your liver or kidneys. Preeclampsia can prevent your baby from getting enough food and oxygen. This can cause a low birth weight or other problems. Your doctor will watch you closely to prevent these problems. He or she also may recommend that you reduce your activity. If your preeclampsia is a danger to your health or the health of your baby, your doctor may need to deliver your baby early. While preeclampsia is a concern, most women with preeclampsia have healthy babies. After a woman gives birth, preeclampsia usually goes away on its own. But symptoms may last a few weeks or more and can get worse after delivery. Rarely, symptoms of preeclampsia don't show up until days or even weeks after childbirth. Follow-up care is a key part of your treatment and safety.  Be sure to make and go to all appointments, and call your doctor if you are having problems. It's also a good idea to know your test results and keep a list of the medicines you take. How can you care for yourself at home? · Take and record your blood pressure at home if your doctor tells you to. ? Learn the importance of the two measures of blood pressure (such as 120 over 80, or 120/80). The first number is the systolic pressure, which is the force of blood on the artery walls as the heart pumps. The second number is the diastolic pressure, which is the force of blood on the artery walls between heartbeats, when the heart is at rest. You have a choice of monitors to use. ? Manual monitor: You pump up the cuff and use a stethoscope to listen for your pulse. ? Electronic monitor: The cuff inflates, and a gauge shows your pulse rate. ? To take your blood pressure:  ? Ask your doctor to check your blood pressure monitor to be sure that it is accurate and that the cuff fits you. Also ask your doctor to watch you to make sure that you are using it right. ? You should not eat, use tobacco products, or use medicine known to raise blood pressure (such as some nasal decongestant sprays) before you take your blood pressure. ? Avoid taking your blood pressure if you have just exercised. Also avoid taking it if you are nervous or upset. Rest at least 15 minutes before you take your blood pressure. · If your doctor advises, check the protein levels in your urine. Your doctor or nurse will show you how to do this. · Take your medicines exactly as prescribed. Call your doctor if you think you are having a problem with your medicine. · Do not smoke. Quitting smoking will help improve your baby's growth and health. If you need help quitting, talk to your doctor about stop-smoking programs and medicines. These can increase your chances of quitting for good.   · Eat a balanced and healthy diet that has lots of fruits and vegetables. · If your doctor advised bed rest, be sure to stay off your feet and rest as much as possible. ? Keep a phone, phone book, notepad, and pen near the bed where you can easily reach them. ? Gently stretch your legs every hour to maintain good blood flow. ? Have another family member pack snacks and lunch food in a cooler close to your bed. ? Use this time for activities that you usually cannot find time for, such as reading, craft projects, or letter writing. · You can keep track of your baby's health by noting the length of time it takes to count 10 movements (such as kicks, flutters, or rolls). Feeling 10 movements in less than 1 hour is considered normal. Track your baby's movements once each day. Bring this record with you to each prenatal visit. When should you call for help? Call 911 anytime you think you may need emergency care. For example, call if:    · You passed out (lost consciousness). · You have a seizure. Call your doctor now or seek immediate medical care if:    · You have symptoms of preeclampsia, such as:  ? Sudden swelling of your face, hands, or feet. ? New vision problems (such as dimness or blurring). ? A severe headache. · Your blood pressure is higher than it should be, or it rises suddenly. · You have new nausea or vomiting. · You think that you are in labor. · You have pain in your belly or pelvis. Watch closely for changes in your health, and be sure to contact your doctor if:    · You gain weight rapidly. Where can you learn more? Go to https://[employer] FFTjonnySandag.Zyt[employer]c. org and sign in to your 911 View account. Enter U838 in the [employer] box to learn more about \"Preeclampsia: Care Instructions. \"     If you do not have an account, please click on the \"Sign Up Now\" link. Current as of: September 5, 2018  Content Version: 12.0  © 7113-6405 Healthwise[employer]. Care instructions adapted under license by [facility] ([facility]).  If you have questions about a medical condition or this instruction, always ask your healthcare professional. [address] any warranty or liability for your use of this information.  Labor: Care Instructions  Your Care Instructions     labor is the start of labor between 21 and 36 weeks of pregnancy. A full-term pregnancy lasts 37 to 42 weeks. In labor, the uterus contracts to open the cervix. This is the first stage of childbirth.  labor can be caused by a problem with the baby, the mother, or both. Often the cause is not known. In some cases, doctors use medicines to try to delay labor until 29 or more weeks of pregnancy. By this time, a baby has grown enough so that problems are not likely. In some cases--such as with a serious infection--it is healthier for the baby to be born early. Your treatment will depend on how far along you are in your pregnancy and on your health and your baby's health. Follow-up care is a key part of your treatment and safety. Be sure to make and go to all appointments, and call your doctor if you are having problems. It's also a good idea to know your test results and keep a list of the medicines you take. How can you care for yourself at home? · If your doctor prescribed medicines, take them exactly as directed. Call your doctor if you think you are having a problem with your medicine. · Rest until your doctor advises you about activity. He or she will tell you if you should stay in bed most of the time. You may need to arrange for  if you have young children. · Do not have sexual intercourse unless your doctor says it is safe. · Use pads, not tampons, if you have vaginal bleeding. · Make sure to drink plenty of fluids. Dehydration can lead to contractions. If you have kidney, heart, or liver disease and have to limit fluids, talk with your doctor before you increase the amount of fluids you drink.   · Do not smoke or allow others to smoke around you. If you need help quitting, talk to your doctor about stop-smoking programs and medicines. These can increase your chances of quitting for good. When should you call for help? Call 911 anytime you think you may need emergency care. For example, call if:    · You passed out (lost consciousness). · You have severe vaginal bleeding. · You have severe pain in your belly or pelvis. · You have had fluid gushing or leaking from your vagina and you know or think the umbilical cord is bulging into your vagina. If this happens, immediately get down on your knees so your rear end (buttocks) is higher than your head. This will decrease the pressure on the cord until help arrives. Call your doctor now or seek immediate medical care if:    · You have signs of preeclampsia, such as:  ? Sudden swelling of your face, hands, or feet. ? New vision problems (such as dimness or blurring). ? A severe headache. · You have any vaginal bleeding. · You have belly pain or cramping. · You have a fever. · You have had regular contractions (with or without pain) for an hour. This means that you have 6 or more within 1 hour after you change your position and drink fluids. · You have a sudden release of fluid from the vagina. · You have low back pain or pelvic pressure that does not go away. · You notice that your baby has stopped moving or is moving much less than normal.    Watch closely for changes in your health, and be sure to contact your doctor if you have any problems. Where can you learn more? Go to [email or web address] [email or web address].[employer]. org and sign in to your [employer] account. Enter Q400 in the Secure Command box to learn more about \" Labor: Care Instructions. \"     If you do not have an account, please click on the \"Sign Up Now\" link. Current as of: 2018  Content Version: 12.0  © 1352-8419 Healthwise, [facility].  Care instructions adapted under license by [facility] ([facility]). If you have questions about a medical condition or this instruction, always ask your healthcare professional. [address] any warranty or liability for your use of this information.

## 2022-01-13 NOTE — PROGRESS NOTES
Presents for OB visit  Gestation 32w4d  Estimated Date of Delivery: 3/6/22    History of csec x2  History of SAB in 2019  Wants to deliver at 1 [facility]- would like Dr. Africa Aponte to do it  FOB: Kevin Quesada ()  FTS and anatomy scan prefers [facility]- son has history of VSD  1st trimester completed at [facility]/bloodwork not done, per Kenard Goodell at [facility] offer NIPS testing here per the DrValentina (declined NIPS 9/1/21)  Normal msAFP  Postrior placenta, eccentric cord insertion  History of gestational diabetes - Early 1 hour completed 9/1/21-normal rpt at 28wk  11/30/21 1 hour - 3 hour GTT Pending   12/3/21 rrBS Approved- see media section   9/9/2021 and again on 12/1/21 rrBS submitted and pending approval- TS  Knows gender- having girl :)  12/1 FLU VACCINE GIVEN  Tdap given 1/13/2022   Impressions   SECTRAPACS - 01/10/2022 11:52 AM EST    IMPRESSION:    1. Single intrauterine pregnancy with expected interval  fetal growth from the previous ultrasound. 2. Humerus measures less than the 5th percentile. 3. Transvaginal Cervical Length = 3.7 cm.  4. Previously seen low lying placenta is now resolved. 5. Amniotic fluid assessment (DVP) is normal.       [facility] OB FOLLOW-UP, 1 FETUS (01/10/2022 11:33 AM EST)   Narrative   ZUNILDA - 01/10/2022 11:52 AM EST      ----------------------------------------------------------------------  OBSTETRICS REPORT                      (Signed Final 01/10/2022 11:52)  ----------------------------------------------------------------------  PATIENT INFO:    ID #:       [id number]                    D. O.B.:  [birth year] (31 yrs)(F)  Name:       [facility]          Visit Date: 01/10/2022 11:16  ----------------------------------------------------------------------  PERFORMED BY:    Performed By:     [address] [place] [address]  Attending:       Margie Sánchez MD  Referred By:     Emma Teran DO  Ref.  Address:     [id number]                  [place], [address] [facility]  Location:         [facility] [place]  ----------------------------------------------------------------------  SERVICE(S) PROVIDED:     OB Follow-up, 1 fetus                                [id number]   OB Transvaginal                                      [id number]  ----------------------------------------------------------------------  INDICATIONS:     History of pregnancy with other poor           O09.299   reproductive or obstetric history, antepartum   Prior child born with VSD   Smoking (tobacco) complicating pregnancy,      N24.945   third trimester   History of previous pregnancy with             O09.899, Z86.32   gestational diabetes   Prior                                 O34.21   Malformation of placenta, unspecified,         O43. 109   antepartum   Screening for cervical length                  Z36.86  ----------------------------------------------------------------------  VITAL SIGNS:    Weight (lb): 118  Height:      5'2\"                       BMI:             21.58    ----------------------------------------------------------------------  FETAL EVALUATION:    Num Of Fetuses:         1  Fetal Heart Rate(bpm):  004  Cardiac Activity:       Present & appears normal  Presentation:           Cephalic  Placenta:               Posterior, away from cervical os                                Largest Pocket(cm)                              4.58  ----------------------------------------------------------------------  BIOMETRY:    BPD:      83.1  mm     G. Age:   26w 3d        79  %  OFD:     101. 3  mm  HC:      293.7  mm     G. Age:   32w 3d        20  %  AC:      274. 6  mm     G. Age:   29w 4d        31  %  FL:       59.4  mm     G. Age:   33w 0d        11  %  HUM:      50.5  mm     G. Age:   31w 4d       < 5  %  LV:        4.5  mm  TIB:      53.4  mm     G. Age:   29w 4d        43  %  CI:          82.0  %       70 - 86  FL/HC:       20.2  %       19.1 - 21.3  HC/AC:       1.07          0.96 - 1.17  FL/BPD:      71.5  %       71 - 87  FL/AC:       21.6  %       20 - 24    Est. FW:    1799  gm    3 lb 15 oz       23  %  ----------------------------------------------------------------------  OB HISTORY:    :    4         Term:   2         SAB:   1  [place]  ----------------------------------------------------------------------  GESTATIONAL AGE:    LMP:           32w 1d      Date:  21           PERRY:   22  U/S Today:     32w 1d                                PERRY:   22  Best:          32w 1d   Det. By: Rigo Gann  (21)    PERRY:   22  ----------------------------------------------------------------------  ANATOMY:    Cranium:               Appears normal  Ventricles:            Appear normal  Lips:                  Appears normal  Diaphragm:             Appears normal  Stomach:               Appears normal, left sided  Cord Vessels:          Appears normal (3 vessel cord)  Kidneys:               Appear normal  Bladder:               Appears normal  ----------------------------------------------------------------------  CERVIX UTERUS ADNEXA:    Cervix  Length:            3.7  cm. Appears closed, without funnelling    Uterus  Gravid uterus    Left Ovary  Not visualized    Right Ovary  Not visualized    Adnexa  No adnexal masses identified  ----------------------------------------------------------------------  COMMENTS:    1. Ultrasound is not diagnostic for fetal chromosomal  abnormalities, will not detect all structural  abnormalities, and is not diagnostic for fetal genetic  disorders even if multiple exams are performed during  a given pregnancy. 2. In addition to the trans abdominal approach, a trans  vaginal ultrasound was also performed to optimize the  visualization of the lower uterine segment and the  cervix.  ----------------------------------------------------------------------  ----------------------------------------------------------------------  RECOMMENDATIONS:    1.  Findings and limitations of sonogram explained to  patient. 2. Subsequent follow up or other follow up as clinically  determined by primary OB provider unless otherwise  specified by MFM. 3. Results forwarded to ordering provider so they can  follow up with the patient as necessary. ----------------------------------------------------------------------            Qamar Salazar MD  Electronically Signed Final Report   01/10/2022 11:52                    OB History    Para Term  AB Living   4 2 2 0 1 2   SAB IAB Ectopic Molar Multiple Live Births   1 0 0   0 2      # Outcome Date GA Lbr Melvin/2nd Weight Sex Delivery Anes PTL Lv   4 Current            3 Term 20 38w0d  4 lb 6.1 oz (1.986 kg) M CS-LTranv Spinal N MANDY      Complications: IUGR (intrauterine growth restriction) affecting care of mother   2 SAB            1 Term 09 38w0d  5 lb 12 oz (2.608 kg) M CS-Unspec   MANDY            Allergies   Allergen Reactions    Arestin [Minocycline] Hives     Current Outpatient Medications   Medication Sig Dispense Refill    Prenatal Vit w/Ws-Ztuvqtoac-IX (PNV PO) Take by mouth       No current facility-administered medications for this visit.            Past Medical History:   Diagnosis Date    Anxiety and depression 10/8/2020    Depression     Gastroesophageal reflux disease 10/8/2020    Gestational diabetes mellitus (GDM), antepartum 2019    IBS (irritable bowel syndrome)     Mental disorder     BIPOLAR    Pnctr w/o FB of r idx fngr w/o damage to nail, subs 2019    TSH deficiency 7/10/2019       Past Surgical History:   Procedure Laterality Date     SECTION       SECTION N/A 2020     SECTION performed by Swetha Ortiz DO at [facility] L[facility]D OR     Headaches: no  Swelling: no  Bleeding: no  Discharge: no   Dysuria: no  Nausea: no  Heartburn: no  Epigastric pain: no  Contractions: no  Leakage of fluid: no  + fetal movement       Return 2 WEEKS    Labs as indicated N/A    PTL signs reviewed, if indicated  Kick Count Instructions given if indicated: The patient was instructed on fetal kick counts and was given a kick sheet to complete every 8 hours. This is to begin at 28 weeks gestation. She was instructed that the baby should move at a minimum of ten times within one hour after a meal. The patient was instructed to lay down on her left side twenty minutes after eating and count movements for up to one hour with a target value of ten movements. She was instructed to notify the office if she did not make that target after two attempts or if after any attempt there was less than four movements. After hour numbers reviewed , along with labor signs if indicated. Contractions/cramping between 5-7 minutes and persisting even with attempts of increased water and laying on side. Fluid leakage, bleeding  NST information given no  The patient was counseled on Labor & Delivery. Route of delivery and counseling on vaginal, operative vaginal, and  sections were completed with the risks of each to both the patient as well as her baby. The possibility of a blood transfusion was discussed as well. The patient was not opposed to receiving a transfusion if needed. The patient was counseled on types of analgesia during labor and is considering either Regional or IV medication the risks, benefits and alternatives were discussed. The patient was counseled on the mandatory call ahead policy. She has been instructed to call the office at anytime prior to going into the hospital so the on-call physician may direct her to the appropriate facility for care. Exceptions were reviewed including but not limited to: Decreased fetal movement, vaginal Bleeding or hemorrhage, trauma, readily expectant delivery, or any instance where she feels 911 should be utilized.       Of the 20 minute duration appointment visit, Yesenia Del Valle CNP spent at least 50% of the face-to-face time in counseling, explanation of diagnosis, planning of further management, and answering all questions.

## 2022-01-13 NOTE — PROGRESS NOTES
The patient requested to have have the Tdap vaccine. Consent obtained. Injection of 0.5mL given Right deltoid Intramuscular. Lot #: [id number]  EXP: 07/05/22  Patient tolerated well.

## 2022-01-28 ENCOUNTER — ROUTINE PRENATAL (OUTPATIENT)
Dept: OBGYN CLINIC | Age: 32
End: 2022-01-28

## 2022-01-28 ENCOUNTER — TELEPHONE (OUTPATIENT)
Dept: OBGYN CLINIC | Age: 32
End: 2022-01-28

## 2022-01-28 VITALS — DIASTOLIC BLOOD PRESSURE: 62 MMHG | WEIGHT: 123 LBS | BODY MASS INDEX: 22.5 KG/M2 | SYSTOLIC BLOOD PRESSURE: 110 MMHG

## 2022-01-28 DIAGNOSIS — O09.90 HIGH RISK PREGNANCY, ANTEPARTUM: Primary | ICD-10-CM

## 2022-01-28 DIAGNOSIS — Z82.79 FAMILY HISTORY OF VSD (VENTRICULAR SEPTAL DEFECT): ICD-10-CM

## 2022-01-28 DIAGNOSIS — Z98.891 HISTORY OF C-SECTION: ICD-10-CM

## 2022-01-28 DIAGNOSIS — Z87.59 HISTORY OF PRIOR PREGNANCY WITH IUGR NEWBORN: ICD-10-CM

## 2022-01-28 DIAGNOSIS — O44.40 LOW-LYING PLACENTA: ICD-10-CM

## 2022-01-28 DIAGNOSIS — Z3A.34 34 WEEKS GESTATION OF PREGNANCY: ICD-10-CM

## 2022-01-28 PROCEDURE — 0502F SUBSEQUENT PRENATAL CARE: CPT | Performed by: ADVANCED PRACTICE MIDWIFE

## 2022-01-28 NOTE — PROGRESS NOTES
SUBJECTIVE:    Stanislaw Tavarez is here for her return OB visit. Doing well. Ready to be done. She reports  feeling fetal movement. She denies vaginal bleeding. She denies vaginal discharge. She denies leaking of fluid. She denies uterine cramping. She denies  nausea and/or vomiting. OBJECTIVE:  Blood pressure 110/62, weight 123 lb (55.8 kg), last menstrual period 2021, not currently breastfeeding. Total weight gain: 7 lb (3.175 kg)    Stanislaw Tavarez has received the flu vaccine as appropriate. Stanislaw Tavarez has received the Tdap vaccine as appropriate  Stanislaw Tavarez has not received the COVID vaccine as appropriate. Reviewed recommendations risks/benefits of virus verses vaccine and at this time declines vaccination    ASSESSMENT/PLAN:  IUP @ 34+5 weeks  S < D    High Risk Pregnancy   Due date is based on LMP and confirmed with early dating ultrasound   Patient's prenatal labs are completed completed. Patient's blood type O positive and rhogam is NOT indicated in pregnancy.  Patient declined genetic screening.  Anatomy scan completed. Placenta posterior, normal cord insertion, cervical length 3.4cm, low lying/previa noted. Follow up as clinically indicated.  Glucola between 24-28 weeks. 1 hour 172, 3 hour completed WNL   Reviewed GBS at 36 weeks and patient is agreeable     2. 34 weeks gestation of pregnancy  - reviewed and reinforced fetal kick counts  - Reviewed signs and symptoms of  labor  - Reviewed signs and symptoms of preeclampsia   - patient request  for c/s recommended seeing next visit to discuss scheduling c/s. 3. Low-lying placenta  - F/up ultrasound next week in office    4. History of   - x2 planned for repeat with bRSS    5. History of prior pregnancy with IUGR   x2 growth ultrasound scheduled for next week    6.  Family history of VSD (ventricular septal defect)  - Completed fetal echo WNL  - Hx of son with VSD          She was counseled regarding all of the

## 2022-01-28 NOTE — TELEPHONE ENCOUNTER
Did you guys discuss surgery date? She said you and her had chose  for , just making sure this still works? ? If so, tabbie can you set this up?     Thanks

## 2022-02-09 ENCOUNTER — HOSPITAL ENCOUNTER (OUTPATIENT)
Age: 32
Setting detail: SPECIMEN
Discharge: HOME | End: 2022-02-09

## 2022-02-09 ENCOUNTER — ROUTINE PRENATAL (OUTPATIENT)
Dept: OBGYN CLINIC | Age: 32
End: 2022-02-09

## 2022-02-09 VITALS — DIASTOLIC BLOOD PRESSURE: 62 MMHG | SYSTOLIC BLOOD PRESSURE: 100 MMHG | WEIGHT: 123 LBS | BODY MASS INDEX: 22.5 KG/M2

## 2022-02-09 DIAGNOSIS — Z3A.36 36 WEEKS GESTATION OF PREGNANCY: Primary | ICD-10-CM

## 2022-02-09 DIAGNOSIS — Z3A.36 36 WEEKS GESTATION OF PREGNANCY: ICD-10-CM

## 2022-02-09 DIAGNOSIS — Z98.891 HISTORY OF C-SECTION: ICD-10-CM

## 2022-02-09 DIAGNOSIS — Z34.93 NORMAL PREGNANCY IN THIRD TRIMESTER: ICD-10-CM

## 2022-02-09 PROCEDURE — 0502F SUBSEQUENT PRENATAL CARE: CPT | Performed by: NURSE PRACTITIONER

## 2022-02-09 NOTE — PATIENT INSTRUCTIONS

## 2022-02-09 NOTE — PROGRESS NOTES
Presents for OB visit  Gestation 36w3d  Estimated Date of Delivery: 3/6/22    BCBS insurance   22 RLTCS w rrBS at [facility] Dr Copeland Hidden  History of csec x2  History of SAB in 2019  Wants to deliver at [facility]- would like Dr. Copeland Hidden to do it  FOB: [facility] ()  FTS and anatomy scan prefers [facility]- son has history of VSD  1st trimester completed at [facility] MFM/bloodwork not done, per Elaine Zapata at [facility] offer NIPS testing here per the  (declined NIPS 21)  Normal msAFP  Postrior placenta, eccentric cord insertion  Short femur on US 1/10/22  History of gestational diabetes - Early 1 hour completed 21-normal rpt at 28wk  21 1 hour - 3 hour GTT passed  12/3/21 rrBS Approved- see media section   Knows gender- having girl :)   FLU VACCINE GIVEN  22 TDAP GIVEN               OB History    Para Term  AB Living   4 2 2 0 1 2   SAB IAB Ectopic Molar Multiple Live Births   1 0 0   0 2      # Outcome Date GA Lbr Melvin/2nd Weight Sex Delivery Anes PTL Lv   4 Current            3 Term 20 38w0d  4 lb 6.1 oz (1.986 kg) M CS-LTranv Spinal N MANDY      Complications: IUGR (intrauterine growth restriction) affecting care of mother   2 SAB            1 Term 09 38w0d  5 lb 12 oz (2.608 kg) M CS-Unspec   MANDY            Allergies   Allergen Reactions    Arestin [Minocycline] Hives     Current Outpatient Medications   Medication Sig Dispense Refill    Prenatal Vit w/Ek-Nlskexgjh-SN (PNV PO) Take by mouth       No current facility-administered medications for this visit.            Past Medical History:   Diagnosis Date    Anxiety and depression 10/8/2020    Depression     Gastroesophageal reflux disease 10/8/2020    Gestational diabetes mellitus (GDM), antepartum 2019    IBS (irritable bowel syndrome)     Mental disorder     BIPOLAR    Pnctr w/o FB of r idx fngr w/o damage to nail, subs 2019    TSH deficiency 7/10/2019       Past Surgical History:   Procedure Laterality Date     SECTION       SECTION N/A 2020     SECTION performed by Cata Pickering DO at [facility] L&D OR     Headaches: no  Swelling: no  Bleeding: no  Discharge: no   Dysuria: no  Nausea: no  Heartburn: no  Epigastric pain: no  Contractions: no  Leakage of fluid: no  + fetal movement       Return [place] as indicated GBS    PTL signs reviewed, if indicated  Kick Count Instructions given if indicated: The patient was instructed on fetal kick counts and was given a kick sheet to complete every 8 hours. This is to begin at 28 weeks gestation. She was instructed that the baby should move at a minimum of ten times within one hour after a meal. The patient was instructed to lay down on her left side twenty minutes after eating and count movements for up to one hour with a target value of ten movements. She was instructed to notify the office if she did not make that target after two attempts or if after any attempt there was less than four movements. After hour numbers reviewed , along with labor signs if indicated. Contractions/cramping between 5-7 minutes and persisting even with attempts of increased water and laying on side. Fluid leakage, bleeding  NST information given no  The patient was counseled on Labor & Delivery. Route of delivery and counseling on vaginal, operative vaginal, and  sections were completed with the risks of each to both the patient as well as her baby. The possibility of a blood transfusion was discussed as well. The patient was not opposed to receiving a transfusion if needed. The patient was counseled on types of analgesia during labor and is considering either Regional or IV medication the risks, benefits and alternatives were discussed. The patient was counseled on the mandatory call ahead policy.  She has been instructed to call the office at anytime prior to going into the hospital so the on-call physician may direct her to the appropriate facility for care. Exceptions were reviewed including but not limited to: Decreased fetal movement, vaginal Bleeding or hemorrhage, trauma, readily expectant delivery, or any instance where she feels 911 should be utilized. Of the 20 minute duration appointment visit, Bay Beltran CNP spent at least 50% of the face-to-face time in counseling, explanation of diagnosis, planning of further management, and answering all questions.

## 2022-02-12 LAB
CULTURE: NORMAL
Lab: NORMAL
SPECIMEN DESCRIPTION: NORMAL

## 2022-02-16 ENCOUNTER — ROUTINE PRENATAL (OUTPATIENT)
Dept: OBGYN CLINIC | Age: 32
End: 2022-02-16

## 2022-02-16 VITALS — DIASTOLIC BLOOD PRESSURE: 62 MMHG | BODY MASS INDEX: 22.5 KG/M2 | SYSTOLIC BLOOD PRESSURE: 112 MMHG | WEIGHT: 123 LBS

## 2022-02-16 DIAGNOSIS — Z34.93 NORMAL PREGNANCY IN THIRD TRIMESTER: Primary | ICD-10-CM

## 2022-02-16 DIAGNOSIS — Z3A.37 37 WEEKS GESTATION OF PREGNANCY: ICD-10-CM

## 2022-02-16 PROCEDURE — 0502F SUBSEQUENT PRENATAL CARE: CPT | Performed by: NURSE PRACTITIONER

## 2022-02-16 NOTE — PROGRESS NOTES
Presents for OB visit  Gestation 37w3d  Estimated Date of Delivery: 3/6/22    BCBS insurance   22 RLTCS w rrBS at [facility] Dr Kylee Singleton- scheduled -[facility]  History of csec x2  History of SAB in 2019  Wants to deliver at [facility]- would like Dr. Kylee Singleton to do it  FOB: [facility] ()  FTS and anatomy scan prefers [facility] MF- son has history of VSD  1st trimester completed at [facility] MFM/bloodwork not done, per Zoraida العلي at [facility] offer NIPS testing here per the  (declined NIPS 21)  Normal msAFP  Postrior placenta, eccentric cord insertion  Short femur on US 1/10/22  History of gestational diabetes - Early 1 hour completed 21-normal rpt at 28wk  21 1 hour - 3 hour GTT passed  12/3/21 rrBS Approved- see media section   Knows gender- having girl :)   FLU VACCINE GIVEN  22 TDAP GIVEN               OB History    Para Term  AB Living   4 2 2 0 1 2   SAB IAB Ectopic Molar Multiple Live Births   1 0 0   0 2      # Outcome Date GA Lbr Melvin/2nd Weight Sex Delivery Anes PTL Lv   4 Current            3 Term 20 38w0d  4 lb 6.1 oz (1.986 kg) M CS-LTranv Spinal N MANDY      Complications: IUGR (intrauterine growth restriction) affecting care of mother   2 SAB            1 Term 09 38w0d  5 lb 12 oz (2.608 kg) M CS-Unspec   MANDY            Allergies   Allergen Reactions    Arestin [Minocycline] Hives     Current Outpatient Medications   Medication Sig Dispense Refill    Prenatal Vit w/Mz-Pyvpzsvhr-HS (PNV PO) Take by mouth       No current facility-administered medications for this visit.            Past Medical History:   Diagnosis Date    Anxiety and depression 10/8/2020    Depression     Gastroesophageal reflux disease 10/8/2020    Gestational diabetes mellitus (GDM), antepartum 2019    IBS (irritable bowel syndrome)     Mental disorder     BIPOLAR    Pnctr w/o FB of r idx fngr w/o damage to nail, subs 2019    TSH deficiency 7/10/2019       Past Surgical History:   Procedure Laterality Date     SECTION       SECTION N/A 2020     SECTION performed by Mariah Corral DO at [facility] L[facility]     Headaches: no  Swelling: no  Bleeding: no  Discharge: no   Dysuria: no  Nausea: no  Heartburn: no  Epigastric pain: no  Contractions: no  Leakage of fluid: no  + fetal movement       Return 1 WEEK    Labs as indicated n/a    PTL signs reviewed, if indicated  Kick Count Instructions given if indicated: The patient was instructed on fetal kick counts and was given a kick sheet to complete every 8 hours. This is to begin at 28 weeks gestation. She was instructed that the baby should move at a minimum of ten times within one hour after a meal. The patient was instructed to lay down on her left side twenty minutes after eating and count movements for up to one hour with a target value of ten movements. She was instructed to notify the office if she did not make that target after two attempts or if after any attempt there was less than four movements. After hour numbers reviewed , along with labor signs if indicated. Contractions/cramping between 5-7 minutes and persisting even with attempts of increased water and laying on side. Fluid leakage, bleeding  NST information given no  The patient was counseled on Labor & Delivery. Route of delivery and counseling on vaginal, operative vaginal, and  sections were completed with the risks of each to both the patient as well as her baby. The possibility of a blood transfusion was discussed as well. The patient was not opposed to receiving a transfusion if needed. The patient was counseled on types of analgesia during labor and is considering either Regional or IV medication the risks, benefits and alternatives were discussed. The patient was counseled on the mandatory call ahead policy.  She has been instructed to call the office at anytime prior to going into the hospital so the on-call physician may direct her to the appropriate facility for care. Exceptions were reviewed including but not limited to: Decreased fetal movement, vaginal Bleeding or hemorrhage, trauma, readily expectant delivery, or any instance where she feels 911 should be utilized. Of the 20 minute duration appointment visit, Cece Bob CNP spent at least 50% of the face-to-face time in counseling, explanation of diagnosis, planning of further management, and answering all questions.

## 2022-02-16 NOTE — PATIENT INSTRUCTIONS

## 2022-02-23 ENCOUNTER — ROUTINE PRENATAL (OUTPATIENT)
Dept: OBGYN CLINIC | Age: 32
End: 2022-02-23

## 2022-02-23 VITALS — WEIGHT: 123.5 LBS | SYSTOLIC BLOOD PRESSURE: 108 MMHG | DIASTOLIC BLOOD PRESSURE: 62 MMHG | BODY MASS INDEX: 22.59 KG/M2

## 2022-02-23 DIAGNOSIS — Z98.891 HISTORY OF C-SECTION: ICD-10-CM

## 2022-02-23 DIAGNOSIS — Z34.93 NORMAL PREGNANCY IN THIRD TRIMESTER: Primary | ICD-10-CM

## 2022-02-23 DIAGNOSIS — Z3A.38 38 WEEKS GESTATION OF PREGNANCY: ICD-10-CM

## 2022-02-23 PROCEDURE — 0502F SUBSEQUENT PRENATAL CARE: CPT | Performed by: NURSE PRACTITIONER

## 2022-02-23 NOTE — PROGRESS NOTES
Presents for OB visit  Gestation 38w3  Estimated Date of Delivery: 3/6/22    BCBS insurance   22 RLTCS w rrBS at [facility] Dr Carol Cole- scheduled -[place]  History of csec x2  History of SAB in 2019  Wants to deliver at [facility]- would like Dr. Carol Cole to do it  FOB: [facility] ()  FTS and anatomy scan prefers [facility] MF- son has history of VSD  1st trimester completed at [facility] MFM/bloodwork not done, per Ector Blandon at [facility] offer NIPS testing here per the  (declined NIPS 21)  Normal msAFP  Postrior placenta, eccentric cord insertion  Short femur on US 1/10/22  History of gestational diabetes - Early 1 hour completed 21-normal rpt at 28wk  21 1 hour - 3 hour GTT passed  12/3/21 rrBS Approved- see media section   Knows gender- having girl :)   FLU VACCINE GIVEN  22 TDAP GIVEN  GBS negative 22               OB History    Para Term  AB Living   4 2 2 0 1 2   SAB IAB Ectopic Molar Multiple Live Births   1 0 0   0 2      # Outcome Date GA Lbr Melvin/2nd Weight Sex Delivery Anes PTL Lv   4 Current            3 Term 20 38w0d  4 lb 6.1 oz (1.986 kg) M CS-LTranv Spinal N MANDY      Complications: IUGR (intrauterine growth restriction) affecting care of mother   2 SAB            1 Term 09 38w0d  5 lb 12 oz (2.608 kg) M CS-Unspec   MANDY            Allergies   Allergen Reactions    Arestin [Minocycline] Hives     Current Outpatient Medications   Medication Sig Dispense Refill    Prenatal Vit w/Hb-Gfhyiylgd-XT (PNV PO) Take by mouth       No current facility-administered medications for this visit.            Past Medical History:   Diagnosis Date    Anxiety and depression 10/8/2020    Depression     Gastroesophageal reflux disease 10/8/2020    Gestational diabetes mellitus (GDM), antepartum 2019    IBS (irritable bowel syndrome)     Mental disorder     BIPOLAR    Pnctr w/o FB of r idx fngr w/o damage to nail, subs 2019    TSH deficiency 7/10/2019 Past Surgical History:   Procedure Laterality Date     SECTION       SECTION N/A 2020     SECTION performed by Jeramy Alexis DO at [facility] L&D OR     Headaches: no  Swelling: no  Bleeding: no  Discharge: no   Dysuria: no  Nausea: no  Heartburn: no  Epigastric pain: no  Contractions: no  Leakage of fluid: no  + fetal movement       Return postpartum care    Labs as indicated n/a    PTL signs reviewed, if indicated  Kick Count Instructions given if indicated: The patient was instructed on fetal kick counts and was given a kick sheet to complete every 8 hours. This is to begin at 28 weeks gestation. She was instructed that the baby should move at a minimum of ten times within one hour after a meal. The patient was instructed to lay down on her left side twenty minutes after eating and count movements for up to one hour with a target value of ten movements. She was instructed to notify the office if she did not make that target after two attempts or if after any attempt there was less than four movements. After hour numbers reviewed , along with labor signs if indicated. Contractions/cramping between 5-7 minutes and persisting even with attempts of increased water and laying on side. Fluid leakage, bleeding  NST information given no  The patient was counseled on Labor & Delivery. Route of delivery and counseling on vaginal, operative vaginal, and  sections were completed with the risks of each to both the patient as well as her baby. The possibility of a blood transfusion was discussed as well. The patient was not opposed to receiving a transfusion if needed. The patient was counseled on types of analgesia during labor and is considering either Regional or IV medication the risks, benefits and alternatives were discussed. The patient was counseled on the mandatory call ahead policy.  She has been instructed to call the office at anytime prior to going into the hospital so the on-call physician may direct her to the appropriate facility for care. Exceptions were reviewed including but not limited to: Decreased fetal movement, vaginal Bleeding or hemorrhage, trauma, readily expectant delivery, or any instance where she feels 911 should be utilized. Of the 20 minute duration appointment visit, Mariajose Barr CNP spent at least 50% of the face-to-face time in counseling, explanation of diagnosis, planning of further management, and answering all questions.

## 2022-02-23 NOTE — PATIENT INSTRUCTIONS

## 2022-02-24 ENCOUNTER — HOSPITAL ENCOUNTER (OUTPATIENT)
Dept: LAB | Age: 32
Setting detail: SPECIMEN
Discharge: HOME | End: 2022-02-24
Payer: COMMERCIAL

## 2022-02-24 DIAGNOSIS — Z01.818 PRE-OP TESTING: Primary | ICD-10-CM

## 2022-02-24 PROCEDURE — U0005 INFEC AGEN DETEC AMPLI PROBE: HCPCS

## 2022-02-24 PROCEDURE — U0003 INFECTIOUS AGENT DETECTION BY NUCLEIC ACID (DNA OR RNA); SEVERE ACUTE RESPIRATORY SYNDROME CORONAVIRUS 2 (SARS-COV-2) (CORONAVIRUS DISEASE [COVID-19]), AMPLIFIED PROBE TECHNIQUE, MAKING USE OF HIGH THROUGHPUT TECHNOLOGIES AS DESCRIBED BY CMS-2020-01-R: HCPCS

## 2022-02-25 ENCOUNTER — ANESTHESIA EVENT (OUTPATIENT)
Dept: LABOR AND DELIVERY | Age: 32
End: 2022-02-25
Payer: COMMERCIAL

## 2022-02-25 LAB
SARS-COV-2: NORMAL
SARS-COV-2: NOT DETECTED
SOURCE: NORMAL

## 2022-02-28 ENCOUNTER — ANESTHESIA (OUTPATIENT)
Dept: LABOR AND DELIVERY | Age: 32
End: 2022-02-28
Payer: COMMERCIAL

## 2022-02-28 ENCOUNTER — HOSPITAL ENCOUNTER (INPATIENT)
Age: 32
LOS: 1 days | Discharge: HOME | End: 2022-03-01
Attending: OBSTETRICS & GYNECOLOGY | Admitting: OBSTETRICS & GYNECOLOGY
Payer: COMMERCIAL

## 2022-02-28 VITALS — OXYGEN SATURATION: 96 % | DIASTOLIC BLOOD PRESSURE: 49 MMHG | SYSTOLIC BLOOD PRESSURE: 94 MMHG | TEMPERATURE: 97.7 F

## 2022-02-28 DIAGNOSIS — Z98.891 S/P REPEAT LOW TRANSVERSE C-SECTION: Primary | ICD-10-CM

## 2022-02-28 LAB
ABO/RH: NORMAL
AMPHETAMINE SCREEN URINE: NEGATIVE
ANTIBODY SCREEN: NEGATIVE
ARM BAND NUMBER: NORMAL
BARBITURATE SCREEN URINE: NEGATIVE
BENZODIAZEPINE SCREEN, URINE: NEGATIVE
CANNABINOID SCREEN URINE: POSITIVE
CARBOXYHEMOGLOBIN: 3.9 %
CARBOXYHEMOGLOBIN: 4.2 %
COCAINE METABOLITE, URINE: NEGATIVE
EXPIRATION DATE: NORMAL
HCO3 CORD ARTERIAL: 26.6 MMOL/L
HCO3 CORD VENOUS: 24.1 MMOL/L
HCT VFR BLD CALC: 34 % (ref 36–46)
HEMOGLOBIN: 11.4 G/DL (ref 12–16)
MCH RBC QN AUTO: 30.1 PG (ref 26–34)
MCHC RBC AUTO-ENTMCNC: 33.6 G/DL (ref 31–37)
MCV RBC AUTO: 89.5 FL (ref 80–100)
METHADONE SCREEN, URINE: NEGATIVE
METHEMOGLOBIN: 1 % (ref 0–1.9)
METHEMOGLOBIN: 1.6 % (ref 0–1.9)
NEGATIVE BASE EXCESS, CORD, VEN: 0.4 MMOL/L
O2 SAT CORD ARTERIAL: 28.9 %
O2 SAT CORD VENOUS: 75.9 %
OPIATES, URINE: NEGATIVE
OXYCODONE SCREEN URINE: NEGATIVE
PCO2 CORD ARTERIAL: 47.5 MMHG (ref 33–49)
PCO2 CORD VENOUS: 37.6 MMHG (ref 28–40)
PDW BLD-RTO: 14.1 % (ref 11.5–14.9)
PH CORD ARTERIAL: 7.36 (ref 7.21–7.31)
PH CORD VENOUS: 7.42 (ref 7.31–7.37)
PHENCYCLIDINE, URINE: NEGATIVE
PLATELET # BLD: 278 K/UL (ref 150–450)
PMV BLD AUTO: 8.8 FL (ref 6–12)
PO2 CORD ARTERIAL: 14 MMHG (ref 9–19)
PO2 CORD VENOUS: 33.4 MMHG (ref 21–31)
POSITIVE BASE EXCESS, CORD, ART: 1.1 MMOL/L
RBC # BLD: 3.8 M/UL (ref 4–5.2)
T. PALLIDUM, IGG: NONREACTIVE
TEST INFORMATION: ABNORMAL
TEXT FOR RESPIRATORY: ABNORMAL
WBC # BLD: 14 K/UL (ref 3.5–11)

## 2022-02-28 PROCEDURE — 2580000003 HC RX 258: Performed by: ANESTHESIOLOGY

## 2022-02-28 PROCEDURE — 36415 COLL VENOUS BLD VENIPUNCTURE: CPT

## 2022-02-28 PROCEDURE — 82805 BLOOD GASES W/O2 SATURATION: CPT

## 2022-02-28 PROCEDURE — 3609079900 HC CESAREAN SECTION: Performed by: OBSTETRICS & GYNECOLOGY

## 2022-02-28 PROCEDURE — 2580000003 HC RX 258

## 2022-02-28 PROCEDURE — 88307 TISSUE EXAM BY PATHOLOGIST: CPT

## 2022-02-28 PROCEDURE — 2500000003 HC RX 250 WO HCPCS: Performed by: NURSE ANESTHETIST, CERTIFIED REGISTERED

## 2022-02-28 PROCEDURE — 6370000000 HC RX 637 (ALT 250 FOR IP)

## 2022-02-28 PROCEDURE — 3700000001 HC ADD 15 MINUTES (ANESTHESIA): Performed by: OBSTETRICS & GYNECOLOGY

## 2022-02-28 PROCEDURE — 6360000002 HC RX W HCPCS: Performed by: OBSTETRICS & GYNECOLOGY

## 2022-02-28 PROCEDURE — 2709999900 HC NON-CHARGEABLE SUPPLY: Performed by: OBSTETRICS & GYNECOLOGY

## 2022-02-28 PROCEDURE — 82800 BLOOD PH: CPT

## 2022-02-28 PROCEDURE — 1220000000 HC SEMI PRIVATE OB R&B

## 2022-02-28 PROCEDURE — 86780 TREPONEMA PALLIDUM: CPT

## 2022-02-28 PROCEDURE — 2500000003 HC RX 250 WO HCPCS

## 2022-02-28 PROCEDURE — 86850 RBC ANTIBODY SCREEN: CPT

## 2022-02-28 PROCEDURE — 86900 BLOOD TYPING SEROLOGIC ABO: CPT

## 2022-02-28 PROCEDURE — 3700000000 HC ANESTHESIA ATTENDED CARE: Performed by: OBSTETRICS & GYNECOLOGY

## 2022-02-28 PROCEDURE — 6360000002 HC RX W HCPCS: Performed by: NURSE ANESTHETIST, CERTIFIED REGISTERED

## 2022-02-28 PROCEDURE — 2580000003 HC RX 258: Performed by: NURSE ANESTHETIST, CERTIFIED REGISTERED

## 2022-02-28 PROCEDURE — 6370000000 HC RX 637 (ALT 250 FOR IP): Performed by: OBSTETRICS & GYNECOLOGY

## 2022-02-28 PROCEDURE — 6360000002 HC RX W HCPCS

## 2022-02-28 PROCEDURE — 80307 DRUG TEST PRSMV CHEM ANLYZR: CPT

## 2022-02-28 PROCEDURE — 88302 TISSUE EXAM BY PATHOLOGIST: CPT

## 2022-02-28 PROCEDURE — 85027 COMPLETE CBC AUTOMATED: CPT

## 2022-02-28 PROCEDURE — 59514 CESAREAN DELIVERY ONLY: CPT

## 2022-02-28 PROCEDURE — 7100000001 HC PACU RECOVERY - ADDTL 15 MIN: Performed by: OBSTETRICS & GYNECOLOGY

## 2022-02-28 PROCEDURE — 76815 OB US LIMITED FETUS(S): CPT

## 2022-02-28 PROCEDURE — 7100000000 HC PACU RECOVERY - FIRST 15 MIN: Performed by: OBSTETRICS & GYNECOLOGY

## 2022-02-28 PROCEDURE — 0UB70ZZ EXCISION OF BILATERAL FALLOPIAN TUBES, OPEN APPROACH: ICD-10-PCS | Performed by: OBSTETRICS & GYNECOLOGY

## 2022-02-28 PROCEDURE — 86901 BLOOD TYPING SEROLOGIC RH(D): CPT

## 2022-02-28 RX ORDER — ACETAMINOPHEN 325 MG/1
650 TABLET ORAL EVERY 4 HOURS PRN
Status: DISCONTINUED | OUTPATIENT
Start: 2022-02-28 | End: 2022-03-01 | Stop reason: HOSPADM

## 2022-02-28 RX ORDER — HYDROCODONE BITARTRATE AND ACETAMINOPHEN 5; 325 MG/1; MG/1
1 TABLET ORAL EVERY 6 HOURS PRN
Status: DISCONTINUED | OUTPATIENT
Start: 2022-02-28 | End: 2022-03-01 | Stop reason: HOSPADM

## 2022-02-28 RX ORDER — SODIUM CHLORIDE, SODIUM LACTATE, POTASSIUM CHLORIDE, CALCIUM CHLORIDE 600; 310; 30; 20 MG/100ML; MG/100ML; MG/100ML; MG/100ML
INJECTION, SOLUTION INTRAVENOUS CONTINUOUS
Status: DISCONTINUED | OUTPATIENT
Start: 2022-02-28 | End: 2022-03-01 | Stop reason: SDUPTHER

## 2022-02-28 RX ORDER — BUPIVACAINE HYDROCHLORIDE 7.5 MG/ML
INJECTION, SOLUTION INTRASPINAL PRN
Status: DISCONTINUED | OUTPATIENT
Start: 2022-02-28 | End: 2022-02-28 | Stop reason: SDUPTHER

## 2022-02-28 RX ORDER — ONDANSETRON 2 MG/ML
4 INJECTION INTRAMUSCULAR; INTRAVENOUS EVERY 6 HOURS PRN
Status: DISCONTINUED | OUTPATIENT
Start: 2022-02-28 | End: 2022-03-01 | Stop reason: SDUPTHER

## 2022-02-28 RX ORDER — DOCUSATE SODIUM 100 MG/1
100 CAPSULE, LIQUID FILLED ORAL 2 TIMES DAILY
Status: DISCONTINUED | OUTPATIENT
Start: 2022-02-28 | End: 2022-03-01 | Stop reason: HOSPADM

## 2022-02-28 RX ORDER — SODIUM CHLORIDE 9 MG/ML
25 INJECTION, SOLUTION INTRAVENOUS PRN
Status: DISCONTINUED | OUTPATIENT
Start: 2022-02-28 | End: 2022-03-01 | Stop reason: SDUPTHER

## 2022-02-28 RX ORDER — SWAB
1 SWAB, NON-MEDICATED MISCELLANEOUS DAILY
Status: DISCONTINUED | OUTPATIENT
Start: 2022-02-28 | End: 2022-03-01 | Stop reason: HOSPADM

## 2022-02-28 RX ORDER — ONDANSETRON 2 MG/ML
4 INJECTION INTRAMUSCULAR; INTRAVENOUS EVERY 6 HOURS PRN
Status: DISCONTINUED | OUTPATIENT
Start: 2022-02-28 | End: 2022-03-01 | Stop reason: HOSPADM

## 2022-02-28 RX ORDER — SODIUM CHLORIDE 0.9 % (FLUSH) 0.9 %
10 SYRINGE (ML) INJECTION PRN
Status: DISCONTINUED | OUTPATIENT
Start: 2022-02-28 | End: 2022-03-01 | Stop reason: HOSPADM

## 2022-02-28 RX ORDER — SODIUM CHLORIDE 0.9 % (FLUSH) 0.9 %
5-40 SYRINGE (ML) INJECTION PRN
Status: DISCONTINUED | OUTPATIENT
Start: 2022-02-28 | End: 2022-03-01 | Stop reason: HOSPADM

## 2022-02-28 RX ORDER — ACETAMINOPHEN 500 MG
1000 TABLET ORAL ONCE
Status: COMPLETED | OUTPATIENT
Start: 2022-02-28 | End: 2022-02-28

## 2022-02-28 RX ORDER — KETOROLAC TROMETHAMINE 30 MG/ML
30 INJECTION, SOLUTION INTRAMUSCULAR; INTRAVENOUS EVERY 6 HOURS
Status: DISCONTINUED | OUTPATIENT
Start: 2022-02-28 | End: 2022-03-01

## 2022-02-28 RX ORDER — KETOROLAC TROMETHAMINE 30 MG/ML
INJECTION, SOLUTION INTRAMUSCULAR; INTRAVENOUS PRN
Status: DISCONTINUED | OUTPATIENT
Start: 2022-02-28 | End: 2022-02-28 | Stop reason: SDUPTHER

## 2022-02-28 RX ORDER — ONDANSETRON 2 MG/ML
INJECTION INTRAMUSCULAR; INTRAVENOUS PRN
Status: DISCONTINUED | OUTPATIENT
Start: 2022-02-28 | End: 2022-02-28 | Stop reason: SDUPTHER

## 2022-02-28 RX ORDER — DIPHENHYDRAMINE HYDROCHLORIDE 50 MG/ML
INJECTION INTRAMUSCULAR; INTRAVENOUS PRN
Status: DISCONTINUED | OUTPATIENT
Start: 2022-02-28 | End: 2022-02-28 | Stop reason: SDUPTHER

## 2022-02-28 RX ORDER — LANOLIN 100 %
OINTMENT (GRAM) TOPICAL
Status: DISCONTINUED | OUTPATIENT
Start: 2022-02-28 | End: 2022-03-01 | Stop reason: HOSPADM

## 2022-02-28 RX ORDER — PHENYLEPHRINE HYDROCHLORIDE 10 MG/ML
INJECTION INTRAVENOUS PRN
Status: DISCONTINUED | OUTPATIENT
Start: 2022-02-28 | End: 2022-02-28 | Stop reason: SDUPTHER

## 2022-02-28 RX ORDER — SODIUM CHLORIDE, SODIUM LACTATE, POTASSIUM CHLORIDE, CALCIUM CHLORIDE 600; 310; 30; 20 MG/100ML; MG/100ML; MG/100ML; MG/100ML
INJECTION, SOLUTION INTRAVENOUS CONTINUOUS
Status: DISCONTINUED | OUTPATIENT
Start: 2022-02-28 | End: 2022-03-01 | Stop reason: HOSPADM

## 2022-02-28 RX ORDER — TRISODIUM CITRATE DIHYDRATE AND CITRIC ACID MONOHYDRATE 500; 334 MG/5ML; MG/5ML
30 SOLUTION ORAL ONCE
Status: COMPLETED | OUTPATIENT
Start: 2022-02-28 | End: 2022-02-28

## 2022-02-28 RX ORDER — SODIUM CHLORIDE 0.9 % (FLUSH) 0.9 %
10 SYRINGE (ML) INJECTION PRN
Status: DISCONTINUED | OUTPATIENT
Start: 2022-02-28 | End: 2022-03-01 | Stop reason: SDUPTHER

## 2022-02-28 RX ORDER — FAMOTIDINE 20 MG/1
20 TABLET, FILM COATED ORAL 2 TIMES DAILY
Status: DISCONTINUED | OUTPATIENT
Start: 2022-02-28 | End: 2022-03-01 | Stop reason: HOSPADM

## 2022-02-28 RX ORDER — LIDOCAINE HYDROCHLORIDE 10 MG/ML
1 INJECTION, SOLUTION EPIDURAL; INFILTRATION; INTRACAUDAL; PERINEURAL
Status: ACTIVE | OUTPATIENT
Start: 2022-02-28 | End: 2022-02-28

## 2022-02-28 RX ORDER — SODIUM CHLORIDE 9 MG/ML
25 INJECTION, SOLUTION INTRAVENOUS PRN
Status: DISCONTINUED | OUTPATIENT
Start: 2022-02-28 | End: 2022-03-01 | Stop reason: HOSPADM

## 2022-02-28 RX ORDER — MORPHINE SULFATE 1 MG/ML
INJECTION, SOLUTION EPIDURAL; INTRATHECAL; INTRAVENOUS PRN
Status: DISCONTINUED | OUTPATIENT
Start: 2022-02-28 | End: 2022-02-28 | Stop reason: SDUPTHER

## 2022-02-28 RX ORDER — SODIUM CHLORIDE, SODIUM LACTATE, POTASSIUM CHLORIDE, CALCIUM CHLORIDE 600; 310; 30; 20 MG/100ML; MG/100ML; MG/100ML; MG/100ML
INJECTION, SOLUTION INTRAVENOUS CONTINUOUS PRN
Status: DISCONTINUED | OUTPATIENT
Start: 2022-02-28 | End: 2022-02-28 | Stop reason: SDUPTHER

## 2022-02-28 RX ORDER — SODIUM CHLORIDE 0.9 % (FLUSH) 0.9 %
5-40 SYRINGE (ML) INJECTION EVERY 12 HOURS SCHEDULED
Status: DISCONTINUED | OUTPATIENT
Start: 2022-02-28 | End: 2022-03-01 | Stop reason: HOSPADM

## 2022-02-28 RX ORDER — SODIUM CHLORIDE, SODIUM LACTATE, POTASSIUM CHLORIDE, AND CALCIUM CHLORIDE .6; .31; .03; .02 G/100ML; G/100ML; G/100ML; G/100ML
1000 INJECTION, SOLUTION INTRAVENOUS ONCE
Status: COMPLETED | OUTPATIENT
Start: 2022-02-28 | End: 2022-02-28

## 2022-02-28 RX ADMIN — MORPHINE SULFATE 0.2 MG: 1 INJECTION EPIDURAL; INTRATHECAL; INTRAVENOUS at 14:09

## 2022-02-28 RX ADMIN — MORPHINE SULFATE 4 MG: 1 INJECTION EPIDURAL; INTRATHECAL; INTRAVENOUS at 14:40

## 2022-02-28 RX ADMIN — ONDANSETRON 4 MG: 2 INJECTION, SOLUTION INTRAMUSCULAR; INTRAVENOUS at 14:11

## 2022-02-28 RX ADMIN — CEFAZOLIN SODIUM 2000 MG: 10 INJECTION, POWDER, FOR SOLUTION INTRAVENOUS at 13:54

## 2022-02-28 RX ADMIN — BUPIVACAINE HYDROCHLORIDE IN DEXTROSE 1.6 ML: 7.5 INJECTION, SOLUTION SUBARACHNOID at 14:09

## 2022-02-28 RX ADMIN — MORPHINE SULFATE 2 MG: 1 INJECTION EPIDURAL; INTRATHECAL; INTRAVENOUS at 14:43

## 2022-02-28 RX ADMIN — SODIUM CHLORIDE, POTASSIUM CHLORIDE, SODIUM LACTATE AND CALCIUM CHLORIDE: 600; 310; 30; 20 INJECTION, SOLUTION INTRAVENOUS at 20:45

## 2022-02-28 RX ADMIN — DIPHENHYDRAMINE HYDROCHLORIDE 12.5 MG: 50 INJECTION INTRAMUSCULAR; INTRAVENOUS at 14:42

## 2022-02-28 RX ADMIN — SODIUM CHLORIDE, POTASSIUM CHLORIDE, SODIUM LACTATE AND CALCIUM CHLORIDE: 600; 310; 30; 20 INJECTION, SOLUTION INTRAVENOUS at 13:31

## 2022-02-28 RX ADMIN — SODIUM CHLORIDE, POTASSIUM CHLORIDE, SODIUM LACTATE AND CALCIUM CHLORIDE: 600; 310; 30; 20 INJECTION, SOLUTION INTRAVENOUS at 12:21

## 2022-02-28 RX ADMIN — MORPHINE SULFATE 1.8 MG: 1 INJECTION EPIDURAL; INTRATHECAL; INTRAVENOUS at 14:45

## 2022-02-28 RX ADMIN — KETOROLAC TROMETHAMINE 30 MG: 30 INJECTION, SOLUTION INTRAMUSCULAR at 20:39

## 2022-02-28 RX ADMIN — Medication 87.3 MILLI-UNITS/MIN: at 15:18

## 2022-02-28 RX ADMIN — DOCUSATE SODIUM 100 MG: 100 CAPSULE, LIQUID FILLED ORAL at 20:39

## 2022-02-28 RX ADMIN — SODIUM CHLORIDE, POTASSIUM CHLORIDE, SODIUM LACTATE AND CALCIUM CHLORIDE: 600; 310; 30; 20 INJECTION, SOLUTION INTRAVENOUS at 13:26

## 2022-02-28 RX ADMIN — SODIUM CITRATE AND CITRIC ACID MONOHYDRATE 30 ML: 500; 334 SOLUTION ORAL at 13:23

## 2022-02-28 RX ADMIN — ACETAMINOPHEN 1000 MG: 500 TABLET ORAL at 13:22

## 2022-02-28 RX ADMIN — FAMOTIDINE 20 MG: 10 INJECTION, SOLUTION INTRAVENOUS at 13:23

## 2022-02-28 RX ADMIN — KETOROLAC TROMETHAMINE 30 MG: 30 INJECTION, SOLUTION INTRAMUSCULAR at 14:33

## 2022-02-28 RX ADMIN — SODIUM CHLORIDE, POTASSIUM CHLORIDE, SODIUM LACTATE AND CALCIUM CHLORIDE: 600; 310; 30; 20 INJECTION, SOLUTION INTRAVENOUS at 15:17

## 2022-02-28 RX ADMIN — PHENYLEPHRINE HYDROCHLORIDE 50 MCG: 10 INJECTION INTRAVENOUS at 14:20

## 2022-02-28 RX ADMIN — MORPHINE SULFATE 2 MG: 1 INJECTION EPIDURAL; INTRATHECAL; INTRAVENOUS at 14:36

## 2022-02-28 RX ADMIN — FAMOTIDINE 20 MG: 20 TABLET ORAL at 20:39

## 2022-02-28 RX ADMIN — HYDROCODONE BITARTRATE AND ACETAMINOPHEN 1 TABLET: 5; 325 TABLET ORAL at 17:52

## 2022-02-28 RX ADMIN — SODIUM CHLORIDE, POTASSIUM CHLORIDE, SODIUM LACTATE AND CALCIUM CHLORIDE 1000 ML: 600; 310; 30; 20 INJECTION, SOLUTION INTRAVENOUS at 12:23

## 2022-02-28 RX ADMIN — Medication 909 ML/HR: at 14:28

## 2022-02-28 RX ADMIN — PHENYLEPHRINE HYDROCHLORIDE 50 MCG: 10 INJECTION INTRAVENOUS at 14:11

## 2022-02-28 ASSESSMENT — PULMONARY FUNCTION TESTS
PIF_VALUE: 0

## 2022-02-28 ASSESSMENT — PAIN SCALES - GENERAL
PAINLEVEL_OUTOF10: 0
PAINLEVEL_OUTOF10: 4
PAINLEVEL_OUTOF10: 8
PAINLEVEL_OUTOF10: 0
PAINLEVEL_OUTOF10: 2
PAINLEVEL_OUTOF10: 0

## 2022-02-28 ASSESSMENT — LIFESTYLE VARIABLES: SMOKING_STATUS: 1

## 2022-02-28 NOTE — H&P
OBSTETRICAL HISTORY AND PHYSICAL    Provider:  GRACIELA Huertas CNM      Date: 2022  Time: 12:16 PM    Patient Name: Alem Cruz  Patient :   Room/Bed: 2772/6530-05  Admission Date/Time: 2022 11:49 AM  MRN #: [id number]  [facility] #: [id number]    Primary Care Physician: Ted Tran MD  Admitting Provider: Dr. Alejandra Britton is a 32 y.o. female [id number]    CC:  Section (Repeat) and risk reducing bilateral salpingectomy     HPI: Alem Cruz is a 32 y.o. [id number] at 36w3d who presents for a repeat  section and risk reducing bilateral salpingectomy. The patient reports fetal movement is present, complains of occasional mild contractions, denies loss of fluid, denies vaginal bleeding. DATING:  LMP: Patient's last menstrual period was 2021 (approximate). Estimated Date of Delivery: 3/6/22   Based on: LMP confirmed by US at [address] [address]:  Patient Active Problem List   Diagnosis    IBS (irritable bowel syndrome)    Hx CS x1    Abnormal urinalysis    Contact with hypodermic needle    Pnctr w/o FB of r idx fngr w/o damage to nail, subs    Delivery with history of     RLTCS 20 M APG  Wt 4#6    Postpartum state    Anxiety and depression    Gastroesophageal reflux disease    Other specified personal risk factors, not elsewhere classified    Encounter for prophylactic surgery for risk factor related to malignant neoplasm of ovary    Short femur of fetus on prenatal ultrasound         Allergies: Allergies as of 2022 - Fully Reviewed 2022   Allergen Reaction Noted    Arestin [minocycline] Hives 2019       Medications:  No current facility-administered medications on file prior to encounter.      Current Outpatient Medications on File Prior to Encounter   Medication Sig Dispense Refill    Prenatal Vit w/Cx-Yapadqmuf-GQ (PNV PO) Take by quit 8 months ago- 8/5/21   Vaping Use    Vaping Use: Never used   Substance and Sexual Activity    Alcohol use: Not Currently     Comment: occasionally    Drug use: Yes     Frequency: 3.0 times per week     Types: Marijuana Fredick Copping)     Comment: \"3x's/week\" 1/14/2020    Sexual activity: Yes     Partners: Male   Other Topics Concern    Not on file   Social History Narrative    Not on file     Social Determinants of Health     Financial Resource Strain:     Difficulty of Paying Living Expenses: Not on file   Food Insecurity:     Worried About Running Out of Food in the Last Year: Not on file    Erik of Food in the Last Year: Not on file   Transportation Needs:     Lack of Transportation (Medical): Not on file    Lack of Transportation (Non-Medical): Not on file   Physical Activity:     Days of Exercise per Week: Not on file    Minutes of Exercise per Session: Not on file   Stress:     Feeling of Stress : Not on file   Social Connections:     Frequency of Communication with Friends and Family: Not on file    Frequency of Social Gatherings with Friends and Family: Not on file    Attends [employer] Services: Not on file    Active Member of [address] [place] [address] or Organizations: Not on file    Attends Club or Organization Meetings: Not on file    Marital Status: Not on file   Intimate Partner Violence:     Fear of Current or Ex-Partner: Not on file    Emotionally Abused: Not on file    Physically Abused: Not on file    Sexually Abused: Not on file   Housing Stability:     Unable to Pay for Housing in the Last Year: Not on file    Number of [place] in the Last Year: Not on file    Unstable Housing in the Last Year: Not on file       Review Of Systems:  A minimum of an eleven point review of systems was completed.     REVIEW OF SYSTEMS:   Constitutional: negative fever, negative chills, negative weight changes   HEENT: negative visual disturbances, negative headaches, negative dizziness, negative hearing loss  Breast: Negative breast abnormalities, negative breast lumps, negative nipple discharge  Respiratory: negative dyspnea, negative cough, negative SOB  Cardiovascular: negative chest pain,  negative palpitations, negative arrhythmia, negative syncope   Gastrointestinal: negative abdominal pain, negative RUQ pain, negative N/V, negative diarrhea, negative constipation, negative bowel changes, negative heartburn   Genitourinary: negative dysuria, negative hematuria, negative urinary incontinence, negative vaginal discharge, negative vaginal bleeding or spotting  Dermatological: negative rash, negative pruritis, negative mole or other skin changes  Hematologic: negative bruising  Immunologic/Lymphatic: negative recent illness, negative recent sick contact  Musculoskeletal: negative back pain, negative myalgias, negative arthralgias  Neurological:  negative dizziness, negative migraines, negative seizures, negative weakness  Behavior/Psych: negative depression, negative anxiety, negative SI, negative HI    Physical Exam:  There were no vitals filed for this visit. General appearance:  Alert, no apparent distress, and cooperative  Skin:  Warm, dry, no rashes or erythema  Neurologic:  alert, oriented, normal speech and gait, normal reflexes  Lungs:  No increased work of breathing, good air exchange, clear to auscultation bilaterally, no crackles or wheezing  Heart:  regular rate and rhythm and no murmur   Breast:  Deferred   Abdomen:  soft, non-tender, no right upper quadrant tenderness, no CVA tenderness. Gravid and consistent with her gestational age, EFW by Leopold's Maneuver was 6 pounds.   Uterus non-tender, no signs of abruption and no signs of chorioamnionitis  Extremities:  no calf tenderness, non edematous, DTRs: normal      LIMITED BEDSIDE US:  Position: Cephalic  Placental Location: posterior  Fetal Heart Tones: Present  Fetal Movement: Present  Amniotic Fluid Index/Volume:  adequate 2x2 cm fluid pocket  Estimated Fetal Weight:  6 lbs 7 oz      MEMBRANES:  Intact                      FETAL HEART RATE:       Baseline: 145     Variability: moderate     Accelerations: present     Decelerations: two variable deceleration noted since arrival            CONTRACTIONS: Frequency: irregular                           Duration: 40-60 seconds                           Intensity: mild      PRENATAL LAB RESULTS:   Blood Type/Rh: O pos  Antibody Screen: negative  Hemoglobin, Hematocrit, Platelets: Hgb 80.7/ZJE 34/Plt 278  Rubella: immune  T. Pallidum, IgG: non-reactive  Hepatitis B Surface Antigen: non-reactive   Hepatitis C Antibody: not done   HIV: non-reactive   Sickle Cell Screen: not done  Gonorrhea: negative  Chlamydia: negative  Urine culture: negative, date: 21    Early 1 hour Glucose Tolerance Test: 127  1 hour Glucose Tolerance Test: 172  3 hour Glucose Tolerance Test: Fastin; 1 hour: 175; 2 hour  153; 3 hour: 87    Group B Strep: negative RV culture on 22  Cystic Fibrosis Screen: not done  First Trimester Screen: not done  MSAFP/Multiple Markers: low risk for aneuploidy  Non-Invasive Prenatal Testing: not done  Anatomy US: posterior placenta, 3 VC, female gender, normal anatomy    ASSESSMENT/PLAN:  Gokul Shoulder is a 32 y.o. female  [id number] At 36w3d  Admit: repeat  section with risk reducing bilateral salpingectomy   - cEFM/[place]  - IV to INT  - COVID testing negative on 2022  - Written consent for UDS obtained, specimen collected  - Diet: NPO  - Pre-op antibiotics ordered    FHR: Category 2  - two variable decelerations noted    Hx of Gestational DM  - Early 1 hr WNL  - 24-28 week glucose elevated: 172  - 3 hour glucose WNL    Prior infant with VSD  - Fetal echo WNL at [facility] on US  - 1/10/22      Plan discussed with Dr. Jo Ann Hernandez, who is agreeable.      Steroids given this admission: No    Risks, benefits, alternatives and possible complications have been discussed in detail with the patient. Admission, and post admission procedures and expectations were discussed in detail. All questions were answered.       GRACIELA Wick CNM  Midwife RENA  2/28/2022, 12:16 PM

## 2022-02-28 NOTE — ANESTHESIA PRE PROCEDURE
Department of Anesthesiology  Preprocedure Note       Name:  Moisés Bustillos   Age:  32 y.o.  :  1990                                          MRN:  [id number]         Date:  2022      Surgeon: Antonino Singleton):  Lonni Canavan, DO    Procedure: Procedure(s):  REPEAT  SECTION W/RISK REDUCTION SALPINGECTOMY BILATERAL    Medications prior to admission:   Prior to Admission medications    Medication Sig Start Date End Date Taking?  Authorizing Provider   Prenatal Vit w/Sm-Feuckuamc-MR (PNV PO) Take by mouth   Yes Historical Provider, MD       Current medications:    Current Facility-Administered Medications   Medication Dose Route Frequency Provider Last Rate Last Admin    lidocaine PF 1 % injection 1 mL  1 mL IntraDERmal Once PRN Julisa Squires MD        lactated ringers infusion   IntraVENous Continuous Julisa Squires  mL/hr at 22 1221 New Bag at 22 1221    sodium chloride flush 0.9 % injection 10 mL  10 mL IntraVENous PRN Va Bobo MD        0.9 % sodium chloride infusion  25 mL IntraVENous PRN Julisa Squires MD        lactated ringers infusion   IntraVENous Continuous Pennelope Rachael, APRN - CNM        lactated ringers bolus  1,000 mL IntraVENous Once Pennelope Rachael, APRN - CNM 1,000 mL/hr at 22 1223 1,000 mL at 22 1223    sodium chloride flush 0.9 % injection 10 mL  10 mL IntraVENous PRN Pennelope Rachael, APRN - CNM        0.9 % sodium chloride infusion  25 mL IntraVENous PRN Pennelope Rachael, APRN - CNM        citric acid-sodium citrate (BICITRA) solution 30 mL  30 mL Oral Once Pennelope Rachael, APRN - CNM        famotidine (PEPCID) injection 20 mg  20 mg IntraVENous Once Pennelope Rachael, APRN - CNM        acetaminophen (TYLENOL) tablet 1,000 mg  1,000 mg Oral Once Pennelope Rachael, APRN - CNM        oxytocin (PITOCIN) 30 units in 500 mL infusion  87.3 david-units/min IntraVENous Continuous PRN Pennelope Rachael, APRN - CNM        And    oxytocin (PITOCIN) 10 unit bolus from the bag  10 Units IntraVENous PRN GRACIELA Cordero CNM        ondansetron [facility]) injection 4 mg  4 mg IntraVENous Q6H PRN GRACIELA Cordero CNM        ceFAZolin (ANCEF) 2000 mg in dextrose 5 % 50 mL IVPB  2,000 mg IntraVENous Once GRACIELA Cordero CNM           Allergies: Allergies   Allergen Reactions    Arestin [Minocycline] Hives       Problem List:    Patient Active Problem List   Diagnosis Code    IBS (irritable bowel syndrome) K58.9    Hx CS x1 Z98.891    Abnormal urinalysis R82.90    Contact with hypodermic needle [id number]. 0XXA    Pnctr w/o FB of r idx fngr w/o damage to nail, subs S61.230D    Delivery with history of  O34.219    RLTCS 20 M APG 9/9 Wt 4#6 O82    Postpartum state Z39.2    Anxiety and depression F41.9, F32. A    Gastroesophageal reflux disease K21.9    Other specified personal risk factors, not elsewhere classified Z91.89    Encounter for prophylactic surgery for risk factor related to malignant neoplasm of ovary Z40.02    Short femur of fetus on prenatal ultrasound O35. 8XX0    S/P repeat low transverse  U78.101       Past Medical History:        Diagnosis Date    Anxiety and depression 10/8/2020    Depression     Gastroesophageal reflux disease 10/8/2020    Gestational diabetes mellitus (GDM), antepartum 2019    IBS (irritable bowel syndrome)     Mental disorder     BIPOLAR    Pnctr w/o FB of r idx fngr w/o damage to nail, subs 2019    TSH deficiency 7/10/2019       Past Surgical History:        Procedure Laterality Date     SECTION       SECTION N/A 2020     SECTION performed by Lexus Pierce DO at [address] [place] [address] L&D OR       Social History:    Social History     Tobacco Use    Smoking status: Former Smoker     Packs/day: 0.25     Years: 13.00     Pack years: 3.25     Types: Cigarettes     Start date: 2002     Quit date: 2021     Years since quittin.1    Smokeless tobacco: Never Used    Tobacco comment: quit 8 months ago- 8/5/21   Substance Use Topics    Alcohol use: Not Currently     Comment: occasionally                                Counseling given: Not Answered  Comment: quit 8 months ago- 8/5/21      Vital Signs (Current): There were no vitals filed for this visit. BP Readings from Last 3 Encounters:   02/23/22 108/62   02/16/22 112/62   02/09/22 100/62       NPO Status:                                                                                 BMI:   Wt Readings from Last 3 Encounters:   02/23/22 123 lb 8 oz (56 kg)   02/16/22 123 lb (55.8 kg)   02/09/22 123 lb (55.8 kg)     There is no height or weight on file to calculate BMI.    CBC:   Lab Results   Component Value Date    WBC 14.0 02/28/2022    RBC 3.80 02/28/2022    RBC 4.25 04/03/2012    HGB 11.4 02/28/2022    HCT 34.0 02/28/2022    MCV 89.5 02/28/2022    RDW 14.1 02/28/2022     02/28/2022     04/03/2012       CMP:   Lab Results   Component Value Date     04/29/2021    K 3.6 04/29/2021     04/29/2021    CO2 26 04/29/2021    BUN 6 04/29/2021    CREATININE 0.51 04/29/2021    GFRAA >60 04/29/2021    LABGLOM >60 04/29/2021    GLUCOSE 172 11/30/2021    GLUCOSE 111 04/29/2021    GLUCOSE 93 01/16/2012    PROT 7.5 04/29/2021    CALCIUM 9.2 04/29/2021    BILITOT 0.50 04/29/2021    ALKPHOS 56 04/29/2021    AST 17 04/29/2021    ALT 10 04/29/2021       POC Tests: No results for input(s): POCGLU, POCNA, POCK, POCCL, POCBUN, POCHEMO, POCHCT in the last 72 hours.     Coags: No results found for: PROTIME, INR, APTT    HCG (If Applicable):   Lab Results   Component Value Date    HCGQUANT 110,010 (H) 08/05/2021        ABGs: No results found for: PHART, PO2ART, LXT4DON, FWZ3TKL, BEART, V6IOJVJK     Type & Screen (If Applicable):  No results found for: LABABO, LABRH    Drug/Infectious Status (If Applicable):  Lab Results   Component Value Date    HEPCAB NONREACTIVE 04/02/2013       COVID-19 Screening (If Applicable):   Lab Results   Component Value Date    COVID19 Not Detected 02/24/2022           Anesthesia Evaluation  Patient summary reviewed and Nursing notes reviewed no history of anesthetic complications:   Airway: Mallampati: II  TM distance: >3 FB   Neck ROM: full  Mouth opening: > = 3 FB Dental: normal exam         Pulmonary:normal exam  breath sounds clear to auscultation  (+) current smoker                           Cardiovascular:Negative CV ROS  Exercise tolerance: good (>4 METS),           Rhythm: regular  Rate: normal                    Neuro/Psych:   (+) psychiatric history:depression/anxiety             GI/Hepatic/Renal:   (+) GERD:,           Endo/Other:    (+) hypothyroidism, blood dyscrasia (Hb 11.4): anemia:., .                  ROS comment: Hx Csection x 2  Hx GDM Abdominal:             Vascular: Other Findings:             Anesthesia Plan      spinal     ASA 2           MIPS: Postoperative opioids intended and Prophylactic antiemetics administered. Anesthetic plan and risks discussed with patient. Plan discussed with CRNA.                   Josef Figueredo MD   2/28/2022

## 2022-02-28 NOTE — PLAN OF CARE
Problem: Anxiety:  Goal: Level of anxiety will decrease  Outcome: Completed     Problem: Venous Thromboembolism - Risk of:  Goal: Will show no signs or symptoms of venous thromboembolism  Outcome: Completed

## 2022-02-28 NOTE — ANESTHESIA POSTPROCEDURE EVALUATION
Department of Anesthesiology  Postprocedure Note    Patient: Radu Guzman  MRN: [id number]  YOB: 1990  Date of evaluation: 2022  Time:  3:40 PM     Procedure Summary     Date: 22 Room / Location: [address] [place] [address] L&D OR / [facility]: RADHA ZHONG    Anesthesia Start: 375 Anesthesia Stop: 479    Procedure: REPEAT  SECTION W/RISK REDUCTION SALPINGECTOMY BILATERAL (N/A Abdomen) Diagnosis:       (REPEAT C/SECTION X 2)      (DUE DATE 3/6/2022)    Surgeons: Diogenes Cordova DO Responsible Provider: Autumn Currie MD    Anesthesia Type: spinal ASA Status: 2          Anesthesia Type: spinal    Braydon Phase I:      Braydon Phase II:      Last vitals: Reviewed and per EMR flowsheets.        Anesthesia Post Evaluation    Comments: POST- ANESTHESIA EVALUATION       Pt Name: Radu Guzman  MRN: [id number]  YOB: 1990  Date of evaluation: 2022  Time:  3:40 PM      /62   Pulse 73   Temp 98.3 °F (36.8 °C) (Oral)   Resp 16   Ht 5' 2\" (1.575 m)   Wt 123 lb (55.8 kg)   LMP 2021 (Approximate)   SpO2 99%   BMI 22.50 kg/m²      Consciousness Level  Awake  Cardiopulmonary Status  Stable  Pain Adequately Treated YES  Nausea / Vomiting  NO  Adequate Hydration  YES  Anesthesia Related Complications NONE      Electronically signed by Autumn Currie MD on 2022 at 3:40 PM

## 2022-02-28 NOTE — OP NOTE
Operative Note      Patient: Contreras Tejada  YOB: 1990  MRN: [id number]    Date of Procedure: 2022    Pre-Op Diagnosis: REPEAT C/SECTION X 2  DUE DATE 3/6/2022    Post-Op Diagnosis: same       Procedure(s):  REPEAT  SECTION W/RISK REDUCTION SALPINGECTOMY BILATERAL    Surgeon(s):  Fabien Comer DO    Assistant:   First Assistant: Aimee Preston, APRN - CNM    Anesthesia: Spinal    Estimated Blood Loss (mL): 170 mL, QBL pending    Complications: None    Specimens:   ID Type Source Tests Collected by Time Destination   A :  Tissue Placenta SURGICAL PATHOLOGY Fabien Comer,  2022 1428    B :  Tissue Fallopian Tube [facility]  [facility] 143    C :  Tissue Fallopian Tube [facility], DO 2022 1434        Implants:  none      Drains:   Urethral Catheter Non-latex 16 fr (Active)       Findings: normal uterus, tubes and ovaries, live female  in cephalic position, Nuchal cord X 2 reduced prior to delivery of fetus. Detailed Description of Procedure: The patient was seen pre-operatively. The risks, benefits, complications, treatment options, and expected outcomes were discussed with the patient. The patient concurred with the proposed plan, giving informed consent. The patient was taken to the Operating Room, identified as [place] Dwarf the procedure verified as  Delivery repeat low transverse with bilateral salpingectomy for risk reduction. A Time Out was held and the above information confirmed.      After spinal anesthesia, the patient was draped and prepped in the usual sterile manner. A Pfannenstiel incision was made at site of prior incision and carried down through the subcutaneous tissue to the fascia using scalpel. Fascial incision was made and extended transversely using navarrete scissors for sharp dissection. The fascia was  from the underlying rectus tissue superiorly using scissors.  The peritoneum was identified and entered bluntly. Peritoneum was extended longitudinally with blunt stretch, bladder retractor was placed.  A low transverse uterine incision was made using a new scalpel blade. Blunt stretch on the hysterotomy incision was made and the amniotomy was performed revealing light meconium stained amniotic fluid.      Delivered from cephalic presentation was a Live Born female infant. Nuchal cord X 2 was identified and reduced easily. The infant was suctioned, dried and the umbilical cord was clamped and cut after one minute delayed cord clamping. The infant was taken to the warmer and attended by pediatrician for evaluation. Cord gases were obtained directly from the umbilical cord. Cord blood was obtained for evaluation. The placenta was removed manually and appeared intact, whole and that the umbilical cord had three vessels noted. Pitocin was started. The uterine outline appeared normal. The uterus was exteriorized and cleaned of all clots and debris. The uterine incision was closed with running locked sutures of 0 Vicryl. Hemostasis was observed. Pressure pack was placed on hysterotomy. Left tube was grasped with sherry, tented and using bovie, entire length of tube was transected along the IP ligament including fimbriae. The distal and proximal ends were tied with 2-0 chromic and the tube was removed from surgical field and sent to pathology. The same procedure was performed to remove the entire right fallopian tube including the fimbriae. Both sides were hemostatic. The uterus was reintroduced into the abdominal cavity. Bilateral abdominal gutters were cleared of all clots and debris. Bilateral ovaries were visualized and appeared normal. The hysterotomy was again inspected and found to be hemostatic, pack was removed.   The peritoneum was re approximated with running suture of 2-0 Vicryl. Rectus muscles were inspected and found to be hemostatic.  The fascia was then reapproximated with running suture of 0 Vicryl. The subcuticular space was irrigated and closed using a 0 Vicryl suture in a running fashion. The skin was reapproximated with a 3-0 Prolene. The skin was then cleansed and dressed with a ABD/Telfa and dermabond in sterile fashion.      Instrument, sponge, and needle counts were correct prior the abdominal closure and at conclusion of the case. The urine remained clear throughout the case.  Ancef was given for antibiotic prophylaxis.  SCDs for DVT prophylaxis remain in place for the post operative period.      Electronically signed by Anjana Bosch DO on 2/28/2022 at 3:07 PM

## 2022-02-28 NOTE — PROGRESS NOTES
Operative Note SURGICAL ASSIST DOCUMENTATION:      NAME: Dana Carey   MRN: [id number]  CSN: [id number]  : 1990    PROCEDURE DATE: 2022     Pre-op Diagnosis: REPEAT C/SECTION X 2  DUE DATE 3/6/2022     Post-op Diagnosis: Same    Procedure: Procedure(s):  REPEAT  SECTION W/RISK REDUCTION SALPINGECTOMY BILATERAL    Surgeon: Dr. Caitlin Cruz    Assistant:   1. GRACIELA Grayson CNM (See below for indication for surgical assistant)      Surgical Assistant documentation:  I was present for the entire surgical case as the assistant surgeon to assist in the surgery and to give adequate visualization and intervene with occasional clamping and suturing and delivery of the  so the procedure could be completed in a safe manner with limited risks to the patient. The patients comorbidities identified in the History & Physical required the need for additional trained personnel to complete this procedure. I understand that section 1842(b)(7)(D) of the Social Security Act generally prohibits  Medicare physician fee schedule payment for the services of assistants at surgery in teaching  hospitals when qualified residents are available to furnish such services. I certify that the  services for which payment is to be claimed were medically necessary and that no qualified resident  was available to perform the services in a safe manner without another surgeon present to directly view their technique while developing their skill set and intervene when necessary to ensure the best outcome for the patient with the least risk of morbidity. I further understand that these services are subject to  post-payment review by the Medicare carrier.           Electronically signed by GRACIELA Grayson CNM on 22 at 3:10 PM EST

## 2022-02-28 NOTE — FLOWSHEET NOTE
C/o breakthrough pain starting. Dr Gracia Long notified and ok's midwife to start narcotics as needed.

## 2022-02-28 NOTE — PLAN OF CARE
Problem: Anxiety:  Goal: Level of anxiety will decrease  Outcome: Completed     Problem: Venous Thromboembolism - Risk of:  Goal: Will show no signs or symptoms of venous thromboembolism  Outcome: Completed     Problem: Pain:  Goal: Pain level will decrease  Outcome: Ongoing  Goal: Control of acute pain  Outcome: Ongoing  Goal: Control of chronic pain  Outcome: Ongoing     Problem: Discharge Planning:  Goal: Discharged to appropriate level of care  Outcome: Ongoing     Problem: Fluid Volume - Imbalance:  Goal: Absence of postpartum hemorrhage signs and symptoms  Outcome: Ongoing  Goal: Absence of imbalanced fluid volume signs and symptoms  Outcome: Ongoing     Problem: Infection - Surgical Site:  Goal: Will show no infection signs and symptoms  Outcome: Ongoing     Problem: Mood - Altered:  Goal: Mood stable  Outcome: Ongoing     Problem: Nausea/Vomiting:  Goal: Absence of nausea/vomiting  Outcome: Ongoing     Problem: Pain - Acute:  Goal: Pain level will decrease  Outcome: Ongoing     Problem: Urinary Retention:  Goal: Urinary elimination within specified parameters  Outcome: Ongoing     Problem: Venous Thromboembolism:  Goal: Will show no signs or symptoms of venous thromboembolism  Outcome: Ongoing  Goal: Absence of signs or symptoms of impaired coagulation  Outcome: Ongoing

## 2022-02-28 NOTE — ANESTHESIA PROCEDURE NOTES
Spinal Block    Patient location during procedure: OB  Start time: 2/28/2022 2:09 PM  End time: 2/28/2022 2:12 PM  Reason for block: primary anesthetic and at surgeon's request  Staffing  Performed: resident/CRNA   Anesthesiologist: Jessee Garcia MD  Resident/CRNA: GRACIELA Garcia CRNA  Preanesthetic Checklist  Completed: patient identified, IV checked, risks and benefits discussed, surgical consent, monitors and equipment checked, pre-op evaluation, timeout performed, anesthesia consent given, oxygen available and patient being monitored  Spinal Block  Patient position: sitting  Prep: Betadine and site prepped and draped  Patient monitoring: continuous pulse ox and frequent blood pressure checks  Approach: midline  Location: L3/L4  Provider prep: mask and sterile gloves  Local infiltration: lidocaine  Dose: 0.2  Agent: bupivacaine  Adjuvant: duramorph  Dose: 1.6  Dose: 1.6  Needle  Needle type: Pencan   Needle gauge: 24 G  Assessment  Sensory level: T4  Swirl obtained: Yes  CSF: clear  Attempts: 1  Hemodynamics: stable

## 2022-02-28 NOTE — FLOWSHEET NOTE
Patient admitted to room 176. Here with repeat . Denies ROM or vaginal bleeding. Denies N/V/D. Denies fever/chills. Relates of + fetal movement. Request for urine sample made. Urine drug screen explained to patient. Instructed on clean catch urine. Consent obtained. Patient verbalizes understanding and acceptance. Assisted into bed, Siderails up x2. Call light in reach. Bed in low position. Oriented to room, surroundings, call system and plan of care. Patient verbalizes understanding. EFM applied and monitor test completed/passed.

## 2022-03-01 VITALS
RESPIRATION RATE: 16 BRPM | WEIGHT: 123 LBS | OXYGEN SATURATION: 99 % | BODY MASS INDEX: 22.63 KG/M2 | DIASTOLIC BLOOD PRESSURE: 75 MMHG | HEIGHT: 62 IN | TEMPERATURE: 98 F | SYSTOLIC BLOOD PRESSURE: 127 MMHG | HEART RATE: 53 BPM

## 2022-03-01 PROCEDURE — 6370000000 HC RX 637 (ALT 250 FOR IP): Performed by: ADVANCED PRACTICE MIDWIFE

## 2022-03-01 PROCEDURE — 6360000002 HC RX W HCPCS: Performed by: OBSTETRICS & GYNECOLOGY

## 2022-03-01 PROCEDURE — 59510 CESAREAN DELIVERY: CPT | Performed by: OBSTETRICS & GYNECOLOGY

## 2022-03-01 PROCEDURE — 58611 LIGATE OVIDUCT(S) ADD-ON: CPT | Performed by: OBSTETRICS & GYNECOLOGY

## 2022-03-01 PROCEDURE — 6370000000 HC RX 637 (ALT 250 FOR IP)

## 2022-03-01 PROCEDURE — 6370000000 HC RX 637 (ALT 250 FOR IP): Performed by: OBSTETRICS & GYNECOLOGY

## 2022-03-01 RX ORDER — IBUPROFEN 800 MG/1
800 TABLET ORAL EVERY 8 HOURS
Status: DISCONTINUED | OUTPATIENT
Start: 2022-03-01 | End: 2022-03-01 | Stop reason: HOSPADM

## 2022-03-01 RX ORDER — HYDROCODONE BITARTRATE AND ACETAMINOPHEN 5; 325 MG/1; MG/1
1 TABLET ORAL EVERY 4 HOURS PRN
Qty: 30 TABLET | Refills: 0 | Status: SHIPPED | OUTPATIENT
Start: 2022-03-01 | End: 2022-03-06

## 2022-03-01 RX ORDER — DOCUSATE SODIUM 100 MG/1
100 CAPSULE, LIQUID FILLED ORAL 2 TIMES DAILY PRN
Qty: 60 CAPSULE | Refills: 0 | Status: SHIPPED | OUTPATIENT
Start: 2022-03-01 | End: 2022-03-09 | Stop reason: ALTCHOICE

## 2022-03-01 RX ORDER — IBUPROFEN 800 MG/1
800 TABLET ORAL EVERY 8 HOURS
Qty: 120 TABLET | Refills: 0 | Status: SHIPPED | OUTPATIENT
Start: 2022-03-01 | End: 2022-05-11

## 2022-03-01 RX ADMIN — IBUPROFEN 800 MG: 800 TABLET, FILM COATED ORAL at 17:55

## 2022-03-01 RX ADMIN — Medication 1 TABLET: at 08:33

## 2022-03-01 RX ADMIN — KETOROLAC TROMETHAMINE 30 MG: 30 INJECTION, SOLUTION INTRAMUSCULAR at 04:06

## 2022-03-01 RX ADMIN — DOCUSATE SODIUM 100 MG: 100 CAPSULE, LIQUID FILLED ORAL at 08:33

## 2022-03-01 RX ADMIN — IBUPROFEN 800 MG: 800 TABLET, FILM COATED ORAL at 09:52

## 2022-03-01 RX ADMIN — HYDROCODONE BITARTRATE AND ACETAMINOPHEN 1 TABLET: 5; 325 TABLET ORAL at 00:40

## 2022-03-01 RX ADMIN — HYDROCODONE BITARTRATE AND ACETAMINOPHEN 1 TABLET: 5; 325 TABLET ORAL at 12:06

## 2022-03-01 RX ADMIN — HYDROCODONE BITARTRATE AND ACETAMINOPHEN 1 TABLET: 5; 325 TABLET ORAL at 17:55

## 2022-03-01 RX ADMIN — FAMOTIDINE 20 MG: 20 TABLET ORAL at 08:33

## 2022-03-01 ASSESSMENT — PAIN SCALES - GENERAL
PAINLEVEL_OUTOF10: 7
PAINLEVEL_OUTOF10: 3
PAINLEVEL_OUTOF10: 7
PAINLEVEL_OUTOF10: 2
PAINLEVEL_OUTOF10: 7

## 2022-03-01 NOTE — PROGRESS NOTES
SW consulted for pt hx of depression and anxiety. Pt also had positive UDS. SW spoke with pt regarding mental health history. Pt disclosed she was diagnosed with Bipolar II Disorder twelve years ago. Pt has been prescribed Wellbutrin by her PCP. Pt informed SW that the Wellbutrin has made a significant impact on her mental health. Pt reported she stopped taking medications when she found out she was pregnant. Pt plans to continue prescription and follow up with her PCP now that baby is born. SW asked if pt was connected with any mental health organizations and pt reported she was not. SW offered to provide resources and pt declined at this time. SW informed pt of positive for THC use. Pt informed SW that she uses marijuana to cope with her mental health diagnosis. SW informed pt that it is protocol for SW to call Child Protective Services for positive UDS-pt understood. Pt disclosed that she has had history with CPS from her last pregnancy, but does not currently have any open cases. SW discussed the negative ramifications of drug use and confirmed pt's personal information. SW asked if pt had everything needed for herself and baby and pt reported yes. Pt declined any further assistance from [address] called [place] [phone] to file report. Pt is able to discharge home with baby and CPS will follow up at the pt's home if necessary.

## 2022-03-01 NOTE — DISCHARGE SUMMARY
Obstetric Discharge Summary  GERARDO HAMM    Patient Name: April Mauricio  Patient :   Room/Bed: [address] [place], [place] [address]  Primary Care Physician: Keyshawn Oh MD  Admit Date: 2022 11:49 AM  MRN #: [id number]  CSN #: [id number]  OBGYN: [facility]    Principal Diagnosis: IUP at 39w1d, admitted for repeat c/s     Her pregnancy has been complicated by:   Patient Active Problem List   Diagnosis    IBS (irritable bowel syndrome)    Hx CS x1    Abnormal urinalysis    Contact with hypodermic needle    Pnctr w/o FB of r idx fngr w/o damage to nail, subs    Delivery with history of     RLTCS 20 M APG  Wt 4#6    Postpartum state    Anxiety and depression    Gastroesophageal reflux disease    Other specified personal risk factors, not elsewhere classified    Encounter for prophylactic surgery for risk factor related to malignant neoplasm of ovary    Short femur of fetus on prenatal ultrasound    S/P repeat low transverse        Infection Present?: No  Hospital Acquired: No    Surgical Operations & Procedures:  [] Pitocin Induction of Labor  [] Pitocin Augmentation of Labor  [] Prostaglandin Induction of Labor  [] Cytotec (Misoprostol)  [] Mechanical Induction of Labor  [] Artificial Rupture of Membranes  [] Intrauterine Pressure Catheter  [] Fetal Scalp Electrode  [] Amnioinfusion  [] Antibiotic Prophylaxis    Analgesia: spinal  Delivery Type:  Delivery  Laceration(s): Absent    Consultations: NICU and Anesthesia    Pertinent Findings & Procedures:   April Mauricio is a 32 y.o. female [id number] at 39w1d admitted for repeat c/s; received repeat c/s.  She delivered by  without labor a Live Born      Information for the patient's :  Aries Bustamante, Baby Girl Zoe Fox   female   Birth Weight: 5 lb 6.8 oz (2.462 kg)       Apgars:   Information for the patient's :  Flores Michele Girl Yaritza Roman [[id number]]          Postpartum course: normal.      Course of patient: uncomplicated    Discharge to: Home    Readmission planned: no     Recommendations on Discharge:     Medications:        Medication List      START taking these medications    docusate sodium 100 MG capsule  Commonly known as: COLACE  Take 1 capsule by mouth 2 times daily as needed for Constipation     HYDROcodone-acetaminophen 5-325 MG per tablet  Commonly known as: Norco  Take 1 tablet by mouth every 4 hours as needed for Pain for up to 5 days. Intended supply: 5 days. Take lowest dose possible to manage pain     ibuprofen 800 MG tablet  Commonly known as: ADVIL;MOTRIN  Take 1 tablet by mouth every 8 hours        CONTINUE taking these medications    PNV PO           Where to Get Your Medications      You can get these medications from any pharmacy    Bring a paper prescription for each of these medications  · docusate sodium 100 MG capsule  · HYDROcodone-acetaminophen 5-325 MG per tablet  · ibuprofen 800 MG tablet         Activity: Slow return to ADLs  Diet: Regular  Follow up: 2 weeks with [facility]    Condition on discharge: stable    Discharge Date: 3/1/2022    GRACIELA Duque CNM    Comments:   Home care, Follow-up care and birth control were reviewed. Signs and symptoms of mastitis and Post Partum Depression were reviewed. The patient is to notify her physician if any of these occur. The patient was counseled on secondary smoke risks and the increased risk of sudden infant death syndrome and respiratory problems to her baby with exposure. She was counseled on various alternate recommendations to decrease the exposure to secondary smoke to her children.

## 2022-03-01 NOTE — FLOWSHEET NOTE
Chlorihexidine Gluconate body wash given to patient with instructions to use at home. Patient verbalizes understanding.

## 2022-03-01 NOTE — FLOWSHEET NOTE
Message left with social service to see pt regarding history of anxiety and positive drug screen on admission.

## 2022-03-01 NOTE — PROGRESS NOTES
POST OPERATIVE DAY #  1 S/P  Section Repeat    Patient Name: Dana Carey  Patient : [birth year]  Room/Bed: 8879/9430-83  Admission Date/Time: 2022 11:49 AM  MRN #: [id number]  [facility] #: [id number]    Date: 3/1/2022  Time: 8:08 AM        Dana Carey is a 32 y.o. female [id number]    This patient was seen today. She Delivered on 22. Her pregnancy was complicated by:     Patient Active Problem List   Diagnosis    IBS (irritable bowel syndrome)    Hx CS x1    Abnormal urinalysis    Contact with hypodermic needle    Pnctr w/o FB of r idx fngr w/o damage to nail, subs    Delivery with history of     RLTCS 20 M APG  Wt 4#6    Postpartum state    Anxiety and depression    Gastroesophageal reflux disease    Other specified personal risk factors, not elsewhere classified    Encounter for prophylactic surgery for risk factor related to malignant neoplasm of ovary    Short femur of fetus on prenatal ultrasound    S/P repeat low transverse        Today she is doing well without any chief complaint. Her lochia is moderate. She denies chest pain, shortness of breath, headache and lightheadedness. She is not breast feeding and she denies any signs or symptoms of mastitis. These were reviewed with her in detail. She is ambulating well. Her bowels are regular. Her voiding pattern is Normal. I reviewed signs and symptoms of post partum depression with the patient, she currently denies any of these symptoms. I have counseled this patient on secondary smoke risks and the increased incidence of sudden infant death syndrome.      Vitals:    22 2038 22 0039 22 0404 22 0654   BP: (!) 96/56 (!) 91/51 (!) 98/56 94/60   Pulse: 55 51 60 50   Resp: 16 16 16 16   Temp: 98.1 °F (36.7 °C) 97.8 °F (36.6 °C) 98.1 °F (36.7 °C) 97.9 °F (36.6 °C)   TempSrc: Oral Oral Oral Oral   SpO2: 100% 99% 98% 99%   Weight:       Height:             Physical Exam:  General: awake, alert, cooperative, no apparent distress, and appears stated age  Neurologic:  alert, oriented, normal speech, no focal findings or movement disorder noted  Lungs:  No increased work of breathing, good air exchange, clear to auscultation bilaterally, no crackles or wheezing  Heart:  Normal apical impulse, regular rate and rhythm, normal S1 and S2, no S3 or S4, and no murmur noted    Abdomen: Soft, nontender, nondistended, bowel sounds present, no rebound, rigidity or guarding   Fundus: non-tender, firm, below umbilicus  Incision: Pressure dressing in place  Extremities:  no calf tenderness, non edematous      LABS:  Admission on 02/28/2022   Component Date Value Ref Range Status    Expiration Date 02/28/2022 03/03/2022,2359   Final    Arm Band Number 02/28/2022 [id number]   Final    ABO/Rh 02/28/2022 O POSITIVE   Final    Antibody Screen 02/28/2022 NEGATIVE   Final    WBC 02/28/2022 14.0* 3.5 - 11.0 k/uL Final    RBC 02/28/2022 3.80* 4.0 - 5.2 m/uL Final    Hemoglobin 02/28/2022 11.4* 12.0 - 16.0 g/dL Final    Hematocrit 02/28/2022 34.0* 36 - 46 % Final    MCV 02/28/2022 89.5  80 - 100 fL Final    MCH 02/28/2022 30.1  26 - 34 pg Final    MCHC 02/28/2022 33.6  31 - 37 g/dL Final    RDW 02/28/2022 14.1  11.5 - 14.9 % Final    Platelets 61/09/7480 278  150 - 450 k/uL Final    MPV 02/28/2022 8.8  6.0 - 12.0 fL Final    T. pallidum, IgG 02/28/2022 NONREACTIVE  NONREACTIVE Final    Comment:       T. pallidum antibodies are not detected. There is no serological evidence of infection with T. pallidum (early primary syphilis   cannot be excluded). Retest in 2-4 weeks if syphilis is clinically suspect.             Amphetamine Screen, Ur 02/28/2022 NEGATIVE  NEGATIVE Final    Comment:       (Positive cutoff 1000 ng/mL)                  Barbiturate Screen, Ur 02/28/2022 NEGATIVE  NEGATIVE Final    Comment:       (Positive cutoff 200 ng/mL)                  Benzodiazepine Screen, Urine 02/28/2022 NEGATIVE NEGATIVE Final    Comment:       (Positive cutoff 200 ng/mL)                  Cocaine Metabolite, Urine 02/28/2022 NEGATIVE  NEGATIVE Final    Comment:       (Positive cutoff 300 ng/mL)                  Methadone Screen, Urine 02/28/2022 NEGATIVE  NEGATIVE Final    Comment:       (Positive cutoff 300 ng/mL)                  Opiates, Urine 02/28/2022 NEGATIVE  NEGATIVE Final    Comment:       (Positive cutoff 300 ng/mL)                  Phencyclidine, Urine 02/28/2022 NEGATIVE  NEGATIVE Final    Comment:       (Positive cutoff 25 ng/mL)                  Cannabinoid Scrn, Ur 02/28/2022 POSITIVE* NEGATIVE Final    Comment:       (Positive cutoff 50 ng/mL)                  Oxycodone Screen, Ur 02/28/2022 NEGATIVE  NEGATIVE Final    Comment:       (Positive cutoff 100 ng/mL)                  Test Information 02/28/2022 Assay provides medical screening only. The absence of expected drug(s) and/or metabolite(s) may indicate diluted or adulterated urine, limitations of testing or timing of collection. Final    Comment: Testing for legal purposes should be confirmed by another method. To request confirmation   of test result, please call the lab within 7 days of sample submission.       pH, Cord Art 02/28/2022 7.357* 7.21 - 7.31 Final    pCO2, Cord Art 02/28/2022 47.5  33.0 - 49.0 mmHg Final    pO2, Cord Art 02/28/2022 14.0  9.0 - 19.0 mmHg Final    HCO3, Cord Art 02/28/2022 26.6  mmol/L Final    Positive Base Excess, Cord, Art 02/28/2022 1.1  mmol/L Final    O2 Sat, Cord Art 02/28/2022 28.9  % Final    Carboxyhemoglobin 02/28/2022 4.2  % Final    Methemoglobin 02/28/2022 1.6  0.0 - 1.9 % Final    Text for Respiratory 02/28/2022 CORD GASES   Final    pH, Cord Dawson 02/28/2022 7.415* 7.31 - 7.37 Final    pCO2, Cord Dawson 02/28/2022 37.6  28.0 - 40.0 mmHg Final    pO2, Cord Dawson 02/28/2022 33.4* 21.0 - 31.0 mmHg Final    HCO3, Cord Dawson 02/28/2022 24.1  mmol/L Final    Negative Base Excess, Cord, [employer] 02/28/2022 0.4  mmol/L Final    O2 Sat, Cord Dawson 02/28/2022 75.9  % Final    Carboxyhemoglobin 02/28/2022 3.9  % Final    Methemoglobin 02/28/2022 1.0  0.0 - 1.9 % Final   ]        Assessment/Plan:  1. Zach Marquis is a [id number] POD # 1 s/p Rpt C/S        - Doing well, VSS               - female infant in [address]              - Encourage ambulation and use of incentive spirometer              - D/C morris catheter and saline lock IV on POD #1               - Motrin/percocet PRN for pain control               - Pressure dressing               - Continue post-op care    - Discharge planned for today/tomorrow   - Follow up in office in 1 week    2. Rh Positive   - Rhogam NOT indicated     3. Hx of Gestational DM  - Early 1 hr WNL  - 24-28 week glucose elevated: 172  - 3 hour glucose WNL     4. Prior infant with VSD  - [facility]etal [facility]      5. Short Femur on   - 1/10/22    - Counseling Completed: Secondary Smoke risks and Sudden Infant Death Syndrome were reviewed with recommendations. Signs and Symptoms of Post Partum Depression were reviewed. The patient is to call if any occur. Signs and symptoms of Mastitis were reviewed. The patient is to call if any occur for follow up.       Attending Physician: Francheska Bess Name:   Electronically signed by Kelly Logan on 3/1/2022 at 8:08 AM

## 2022-03-01 NOTE — PLAN OF CARE
Problem: Pain:  Goal: Pain level will decrease  Description: Pain level will decrease  3/1/2022 0009 by Melida Brunson RN  Outcome: Ongoing     Problem: Pain:  Goal: Control of acute pain  Description: Control of acute pain  3/1/2022 0009 by Melida Brunson RN  Outcome: Ongoing     Problem: Pain:  Goal: Control of chronic pain  Description: Control of chronic pain  3/1/2022 0009 by Melida Brunson RN  Outcome: Ongoing     Problem: Discharge Planning:  Goal: Discharged to appropriate level of care  Description: Discharged to appropriate level of care  3/1/2022 0009 by Melida Brunson RN  Outcome: Ongoing     Problem: Fluid Volume - Imbalance:  Goal: Absence of postpartum hemorrhage signs and symptoms  Description: Absence of postpartum hemorrhage signs and symptoms  3/1/2022 0009 by Melida Brunson RN  Outcome: Ongoing     Problem: Fluid Volume - Imbalance:  Goal: Absence of imbalanced fluid volume signs and symptoms  Description: Absence of imbalanced fluid volume signs and symptoms  3/1/2022 0009 by Melida Brunson RN  Outcome: Ongoing     Problem: Infection - Surgical Site:  Goal: Will show no infection signs and symptoms  Description: Will show no infection signs and symptoms  3/1/2022 0009 by Melida Brunson RN  Outcome: Ongoing     Problem: Mood - Altered:  Goal: Mood stable  Description: Mood stable  3/1/2022 0009 by Melida Brunson RN  Outcome: Ongoing     Problem: Nausea/Vomiting:  Goal: Absence of nausea/vomiting  Description: Absence of nausea/vomiting  3/1/2022 0009 by Melida Brunson RN  Outcome: Ongoing     Problem: Pain - Acute:  Goal: Pain level will decrease  Description: Pain level will decrease  3/1/2022 0009 by Melida Brunson RN  Outcome: Ongoing     Problem: Urinary Retention:  Goal: Urinary elimination within specified parameters  Description: Urinary elimination within specified parameters  3/1/2022 0009 by Melida Brunson RN  Outcome: Ongoing     Problem: Venous Thromboembolism:  Goal: Will show no signs or symptoms of venous thromboembolism  Description: Will show no signs or symptoms of venous thromboembolism  3/1/2022 0009 by Caroline Phan RN  Outcome: Ongoing     Problem: Venous Thromboembolism:  Goal: Absence of signs or symptoms of impaired coagulation  Description: Absence of signs or symptoms of impaired coagulation  3/1/2022 0009 by Caroline Phan RN  Outcome: Ongoing

## 2022-03-02 LAB — SURGICAL PATHOLOGY REPORT: NORMAL

## 2022-03-08 SDOH — HEALTH STABILITY: PHYSICAL HEALTH: ON AVERAGE, HOW MANY DAYS PER WEEK DO YOU ENGAGE IN MODERATE TO STRENUOUS EXERCISE (LIKE A BRISK WALK)?: 1 DAY

## 2022-03-08 SDOH — HEALTH STABILITY: PHYSICAL HEALTH: ON AVERAGE, HOW MANY MINUTES DO YOU ENGAGE IN EXERCISE AT THIS LEVEL?: 30 MIN

## 2022-03-09 ENCOUNTER — OFFICE VISIT (OUTPATIENT)
Dept: PRIMARY CARE CLINIC | Age: 32
End: 2022-03-09
Payer: COMMERCIAL

## 2022-03-09 VITALS
HEART RATE: 72 BPM | BODY MASS INDEX: 20.54 KG/M2 | SYSTOLIC BLOOD PRESSURE: 110 MMHG | DIASTOLIC BLOOD PRESSURE: 74 MMHG | HEIGHT: 62 IN | WEIGHT: 111.6 LBS | OXYGEN SATURATION: 99 % | RESPIRATION RATE: 14 BRPM

## 2022-03-09 DIAGNOSIS — F32.A MILD DEPRESSION: ICD-10-CM

## 2022-03-09 DIAGNOSIS — Z76.89 ENCOUNTER TO ESTABLISH CARE: Primary | ICD-10-CM

## 2022-03-09 DIAGNOSIS — F41.9 ANXIETY: ICD-10-CM

## 2022-03-09 PROCEDURE — 99213 OFFICE O/P EST LOW 20 MIN: CPT | Performed by: NURSE PRACTITIONER

## 2022-03-09 RX ORDER — BUPROPION HYDROCHLORIDE 150 MG/1
150 TABLET ORAL EVERY MORNING
Qty: 30 TABLET | Refills: 5 | Status: SHIPPED | OUTPATIENT
Start: 2022-03-09 | End: 2022-04-11

## 2022-03-09 SDOH — ECONOMIC STABILITY: FOOD INSECURITY: WITHIN THE PAST 12 MONTHS, THE FOOD YOU BOUGHT JUST DIDN'T LAST AND YOU DIDN'T HAVE MONEY TO GET MORE.: NEVER TRUE

## 2022-03-09 SDOH — ECONOMIC STABILITY: FOOD INSECURITY: WITHIN THE PAST 12 MONTHS, YOU WORRIED THAT YOUR FOOD WOULD RUN OUT BEFORE YOU GOT MONEY TO BUY MORE.: NEVER TRUE

## 2022-03-09 ASSESSMENT — ENCOUNTER SYMPTOMS
ABDOMINAL PAIN: 0
WHEEZING: 0
DIARRHEA: 0
COUGH: 0
SHORTNESS OF BREATH: 0
VOMITING: 0
EYE DISCHARGE: 0
SINUS PAIN: 0
EYE REDNESS: 0
TROUBLE SWALLOWING: 0
EYE ITCHING: 0
CHEST TIGHTNESS: 0
SORE THROAT: 0
NAUSEA: 0
SINUS PRESSURE: 0

## 2022-03-09 ASSESSMENT — PATIENT HEALTH QUESTIONNAIRE - PHQ9
2. FEELING DOWN, DEPRESSED OR HOPELESS: 0
9. THOUGHTS THAT YOU WOULD BE BETTER OFF DEAD, OR OF HURTING YOURSELF: 0
SUM OF ALL RESPONSES TO PHQ QUESTIONS 1-9: 0
1. LITTLE INTEREST OR PLEASURE IN DOING THINGS: 0
SUM OF ALL RESPONSES TO PHQ9 QUESTIONS 1 & 2: 0
5. POOR APPETITE OR OVEREATING: 0
SUM OF ALL RESPONSES TO PHQ QUESTIONS 1-9: 0
10. IF YOU CHECKED OFF ANY PROBLEMS, HOW DIFFICULT HAVE THESE PROBLEMS MADE IT FOR YOU TO DO YOUR WORK, TAKE CARE OF THINGS AT HOME, OR GET ALONG WITH OTHER PEOPLE: 0
8. MOVING OR SPEAKING SO SLOWLY THAT OTHER PEOPLE COULD HAVE NOTICED. OR THE OPPOSITE, BEING SO FIGETY OR RESTLESS THAT YOU HAVE BEEN MOVING AROUND A LOT MORE THAN USUAL: 0
6. FEELING BAD ABOUT YOURSELF - OR THAT YOU ARE A FAILURE OR HAVE LET YOURSELF OR YOUR FAMILY DOWN: 0
4. FEELING TIRED OR HAVING LITTLE ENERGY: 0
SUM OF ALL RESPONSES TO PHQ QUESTIONS 1-9: 0
7. TROUBLE CONCENTRATING ON THINGS, SUCH AS READING THE NEWSPAPER OR WATCHING TELEVISION: 0
SUM OF ALL RESPONSES TO PHQ QUESTIONS 1-9: 0
3. TROUBLE FALLING OR STAYING ASLEEP: 0

## 2022-03-09 ASSESSMENT — SOCIAL DETERMINANTS OF HEALTH (SDOH): HOW HARD IS IT FOR YOU TO PAY FOR THE VERY BASICS LIKE FOOD, HOUSING, MEDICAL CARE, AND HEATING?: NOT HARD AT ALL

## 2022-03-09 NOTE — PROGRESS NOTES
[facility] PR[facility]  [address]    [address]  [address] [address]  Dept: [phone]  Dept Fax: [phone]    Maureen Hidalgo is a 32 y.o. female who presents today for her medical conditions/complaintsas noted below. Maureen Hidalgo is c/o of Establish Care (discuss getting back on wellbutrin)        HPI:     Pt presents to establish care  bp stable  Weight stable    Patient presents to establish care. She had a baby a week and a half ago via c section and risk reduction. Dr. Abdullahi Manning is gyn. She had a baby girl, Anne Moya. She has 3 kids total.  Her other 2 children are boys ages 15 and 2, Steven Rosa Elena and Mannie Silvino Vincent 84. things are going really well. She is not breast feeding. Denies any baby blues but would like to be restarted on her Wellbutrin. She stopped taking this when she got pregnant.     She is otherwise generally healthy and has no other health concerns today      Past Medical History:   Diagnosis Date    Anxiety and depression 10/8/2020    Depression     Gastroesophageal reflux disease 10/8/2020    Gestational diabetes mellitus (GDM), antepartum 2019    IBS (irritable bowel syndrome)     Mental disorder     BIPOLAR    Pnctr w/o FB of r idx fngr w/o damage to nail, subs 2019    TSH deficiency 7/10/2019      Past Surgical History:   Procedure Laterality Date     SECTION       SECTION N/A 2020     [facility] [address] N/A 2022    REPEAT  SECTION W/RISK REDUCTION SALPINGECTOMY BILATERAL performed by Pamela Ambriz DO at [facility]       Family History   Problem Relation Age of Onset    Hypertension Father     Arthritis Mother         FIBROMYALGIA, BLOOD TRANSFUSIONS, VARICOSE VEINS    Pancreatic Cancer Paternal Grandfather     Lung Cancer Paternal Grandfather        Social History     Tobacco Use    Smoking status: Former Smoker Packs/day: 0.25     Years: 13.00     Pack years: 3.25     Types: Cigarettes     Start date: 2002     Quit date: 2021     Years since quittin.1    Smokeless tobacco: Never Used    Tobacco comment: quit 8 months ago- 21   Substance Use Topics    Alcohol use: Not Currently     Comment: occasionally      Current Outpatient Medications   Medication Sig Dispense Refill    buPROPion (WELLBUTRIN XL) 150 MG extended release tablet Take 1 tablet by mouth every morning 30 tablet 5    ibuprofen (ADVIL;MOTRIN) 800 MG tablet Take 1 tablet by mouth every 8 hours 120 tablet 0     No current facility-administered medications for this visit. Allergies   Allergen Reactions    Arestin [Minocycline] Hives       Health Maintenance   Topic Date Due    Varicella vaccine (1 of 2 - 2-dose childhood series) Never done    COVID-19 Vaccine (1) Never done    Depression Monitoring  Never done    Cervical cancer screen  10/20/2025    DTaP/Tdap/Td vaccine (3 - Td or Tdap) 2032    Flu vaccine  Completed    Hepatitis C screen  Completed    HIV screen  Completed    Hepatitis A vaccine  Aged Out    Hepatitis B vaccine  Aged Out    Hib vaccine  Aged Out    Meningococcal (ACWY) vaccine  Aged Out    Pneumococcal 0-64 years Vaccine  Aged Out       :     Review of Systems   Constitutional: Negative for chills, fatigue and fever. HENT: Negative for ear discharge, ear pain, sinus pressure, sinus pain, sore throat and trouble swallowing. Eyes: Negative for discharge, redness and itching. Respiratory: Negative for cough, chest tightness, shortness of breath and wheezing. Cardiovascular: Negative for chest pain. Gastrointestinal: Negative for abdominal pain, diarrhea, nausea and vomiting. Genitourinary: Negative for difficulty urinating. Musculoskeletal: Negative for arthralgias and neck pain. Skin: Negative for rash. Neurological: Negative for dizziness, weakness, light-headedness and headaches. All other systems reviewed and are negative. Objective:     Physical Exam  Constitutional:       Appearance: Normal appearance. She is normal weight. HENT:      Head: Normocephalic and atraumatic. Nose: Nose normal.   Eyes:      Extraocular Movements: Extraocular movements intact. Conjunctiva/sclera: Conjunctivae normal.      Pupils: Pupils are equal, round, and reactive to light. Cardiovascular:      Rate and Rhythm: Normal rate and regular rhythm. Pulses: Normal pulses. Heart sounds: Normal heart sounds. Pulmonary:      Effort: Pulmonary effort is normal.      Breath sounds: Normal breath sounds. Abdominal:      General: Abdomen is flat. Palpations: Abdomen is soft. Musculoskeletal:         General: Normal range of motion. Cervical back: Neck supple. Skin:     General: Skin is warm and dry. Capillary Refill: Capillary refill takes less than 2 seconds. Neurological:      General: No focal deficit present. Mental Status: She is alert and oriented to person, place, and time. Psychiatric:         Mood and Affect: Mood normal.       /74   Pulse 72   Resp 14   Ht 5' 2\" (1.575 m)   Wt 111 lb 9.6 oz (50.6 kg)   LMP 05/30/2021 (Approximate)   SpO2 99%   Breastfeeding No   BMI 20.41 kg/m²     Assessment:       Diagnosis Orders   1. Encounter to establish care     2. Mild depression ([address]     3. Anxiety         :      Return in about 1 month (around 4/9/2022) for depression. 1.  Encounter establish care  -Reviewed previous records and lab work  2.-3.  depression and anxiety  Rx for refill for Wellbutrin 150 mg XR. She thinks that this is the dose that they had her at. She is to follow-up in 1 month for reevaluation. F/u in one month or sooner as needed. She is agreeable.    Orders Placed This Encounter   Medications    buPROPion (WELLBUTRIN XL) 150 MG extended release tablet     Sig: Take 1 tablet by mouth every morning     Dispense:  30 tablet     Refill:  5       Patient given educational materials - seepatient instructions. Discussed use, benefit, and side effects of prescribed medications. All patient questions answered. Pt voiced understanding. Reviewed health maintenance. Instructed to continue current medications, diet and exercise. Patient agreedwith treatment plan. Follow up as directed.       Electronically signed by GRACIELA Calderon CNP on 3/9/2022at 11:41 AM

## 2022-03-15 ENCOUNTER — POSTPARTUM VISIT (OUTPATIENT)
Dept: OBGYN CLINIC | Age: 32
End: 2022-03-15

## 2022-03-15 VITALS — BODY MASS INDEX: 19.94 KG/M2 | SYSTOLIC BLOOD PRESSURE: 110 MMHG | DIASTOLIC BLOOD PRESSURE: 62 MMHG | WEIGHT: 109 LBS

## 2022-03-15 DIAGNOSIS — Z98.891 S/P REPEAT LOW TRANSVERSE C-SECTION: Primary | ICD-10-CM

## 2022-03-15 PROCEDURE — 99024 POSTOP FOLLOW-UP VISIT: CPT | Performed by: OBSTETRICS & GYNECOLOGY

## 2022-03-17 NOTE — PROGRESS NOTES
Cancer Paternal Grandfather        Social History     Socioeconomic History    Marital status:      Spouse name: Not on file    Number of children: Not on file    Years of education: Not on file    Highest education level: Not on file   Occupational History    Not on file   Tobacco Use    Smoking status: Former Smoker     Packs/day: 0.25     Years: 13.00     Pack years: 3.25     Types: Cigarettes     Start date: 2002     Quit date: 2021     Years since quittin.2    Smokeless tobacco: Never Used    Tobacco comment: quit 8 months ago- 21   Vaping Use    Vaping Use: Never used   Substance and Sexual Activity    Alcohol use: Not Currently     Comment: occasionally    Drug use: Yes     Frequency: 3.0 times per week     Types: Marijuana Rondi Baba)     Comment: \"3x's/week\" 2020    Sexual activity: Yes     Partners: Male   Other Topics Concern    Not on file   Social History Narrative    Not on file     Social Determinants of Health     Financial Resource Strain: Low Risk     Difficulty of Paying Living Expenses: Not hard at all   Food Insecurity: No Food Insecurity    Worried About Running Out of Food in the Last Year: Never true    Erik of Food in the Last Year: Never true   Transportation Needs:     Lack of Transportation (Medical): Not on file    Lack of Transportation (Non-Medical):  Not on file   Physical Activity: Insufficiently Active    Days of Exercise per Week: 1 day    Minutes of Exercise per Session: 30 min   Stress:     Feeling of Stress : Not on file   Social Connections:     Frequency of Communication with Friends and Family: Not on file    Frequency of Social Gatherings with Friends and Family: Not on file    Attends Restorationism Services: Not on file    Active Member of Clubs or Organizations: Not on file    Attends Club or Organization Meetings: Not on file    Marital Status: Not on file   Intimate Partner Violence: Not At Risk    Fear of Current or Ex-Partner: No    Emotionally Abused: No    Physically Abused: No    Sexually Abused: No   Housing Stability:     Unable to Pay for Housing in the Last Year: Not on file    Number of Places Lived in the Last Year: Not on file    Unstable Housing in the Last Year: Not on file         MEDICATIONS:  Current Outpatient Medications   Medication Sig Dispense Refill    buPROPion (WELLBUTRIN XL) 150 MG extended release tablet Take 1 tablet by mouth every morning 30 tablet 5    ibuprofen (ADVIL;MOTRIN) 800 MG tablet Take 1 tablet by mouth every 8 hours 120 tablet 0     No current facility-administered medications for this visit. ALLERGIES:  Allergies as of 03/15/2022 - Fully Reviewed 03/09/2022   Allergen Reaction Noted    Arestin [minocycline] Hives 06/11/2019     Review Of Systems (11 point):  Constitutional: No fever, chills or malaise; No weight change or fatigue  Head and Eyes: No vision, Headache, Dizziness or trauma in last 12 months  ENT ROS: No hearing, Tinnitis, sinus or taste problems  Hematological and Lymphatic ROS:No Lymphoma, Von Willebrand's, Hemophillia or Bleeding History  Psych ROS: No Depression, Homicidal thoughts,suicidal thoughts, or anxiety  Breast ROS: No prior breast abnormalities or lumps  Respiratory ROS: No SOB, Pneumoniae,Cough, or Pulmonary Embolism History  Cardiovascular ROS: No Chest Pain with Exertion, Palpitations, Syncope, Edema, Arrhythmia  Gastrointestinal ROS: No Indigestion, Heartburn, Nausea, vomiting, Diarrhea, Constipation,or Bowel Changes; No Bloody Stools or melena  Genito-Urinary ROS: No Dysuria, Hematuria or Nocturia.  No Urinary Incontinence or Vaginal Discharge  Musculoskeletal ROS: No Arthralgia, Arthritis,Gout,Osteoporosis or Rheumatism  Neurological ROS: No CVA, Migraines, Epilepsy, Seizure Hx, or Limb Weakness  Dermatological ROS: No Rash, Itching, Hives, Mole Changes or Cancer          Blood pressure 110/62, weight 109 lb (49.4 kg), last menstrual period 05/30/2021, not currently breastfeeding. Abdomen: Soft non-tender; good bowel sounds. No guarding, rebound or rigidity. No CVA tenderness bilaterally. Prolene stitch removed today in office, incision remained intact with no swelling, erythema or breaks in incision line, incision dry    Extremities: No calf tenderness, DTR 2/4, and No edema bilaterally    Pelvic: Exam deferred. Diagnostics:  No results found. Lab Results:  Results for orders placed or performed during the hospital encounter of 02/28/22   CBC   Result Value Ref Range    WBC 14.0 (H) 3.5 - 11.0 k/uL    RBC 3.80 (L) 4.0 - 5.2 m/uL    Hemoglobin 11.4 (L) 12.0 - 16.0 g/dL    Hematocrit 34.0 (L) 36 - 46 %    MCV 89.5 80 - 100 fL    MCH 30.1 26 - 34 pg    MCHC 33.6 31 - 37 g/dL    RDW 14.1 11.5 - 14.9 %    Platelets 706 756 - 680 k/uL    MPV 8.8 6.0 - 12.0 fL   T. pallidum Ab   Result Value Ref Range    T. pallidum, IgG NONREACTIVE NONREACTIVE   DRUG SCREEN MULTI URINE   Result Value Ref Range    Amphetamine Screen, Ur NEGATIVE NEGATIVE    Barbiturate Screen, Ur NEGATIVE NEGATIVE    Benzodiazepine Screen, Urine NEGATIVE NEGATIVE    Cocaine Metabolite, Urine NEGATIVE NEGATIVE    Methadone Screen, Urine NEGATIVE NEGATIVE    Opiates, Urine NEGATIVE NEGATIVE    Phencyclidine, Urine NEGATIVE NEGATIVE    Cannabinoid Scrn, Ur POSITIVE (A) NEGATIVE    Oxycodone Screen, Ur NEGATIVE NEGATIVE    Test Information       Assay provides medical screening only. The absence of expected drug(s) and/or metabolite(s) may indicate diluted or adulterated urine, limitations of testing or timing of collection.    BLOOD GAS, CORD BLOOD   Result Value Ref Range    pH, Cord Art 7.357 (H) 7.21 - 7.31    pCO2, Cord Art 47.5 33.0 - 49.0 mmHg    pO2, Cord Art 14.0 9.0 - 19.0 mmHg    HCO3, Cord Art 26.6 mmol/L    Positive Base Excess, Cord, Art 1.1 mmol/L    O2 Sat, Cord Art 28.9 %    Carboxyhemoglobin 4.2 %    Methemoglobin 1.6 0.0 - 1.9 %    Text for Respiratory CORD GASES     pH, Cord Dawson 7.415 (H) 7.31 - 7.37    pCO2, Cord Dawson 37.6 28.0 - 40.0 mmHg    pO2, Cord Dawson 33.4 (H) 21.0 - 31.0 mmHg    HCO3, Cord Dawson 24.1 mmol/L    Negative Base Excess, Cord, Dawson 0.4 mmol/L    O2 Sat, Cord Dawson 75.9 %    Carboxyhemoglobin 3.9 %    Methemoglobin 1.0 0.0 - 1.9 %   SURGICAL PATHOLOGY REPORT   Result Value Ref Range    Surgical Pathology Report       -- Diagnosis --    A. Placenta, third trimester:    -  Meconium staining.    -  Umbilical cord and fetal membranes, free of inflammation.    -  Plate, no histologic abnormality. B.  Right fallopian tube, excision:  No histologic abnormality. C.  Left Fallopian tube, excision:  No histologic abnormality. Maximo Bauer M.D.  **Electronically Signed Out**         rdd/3/2/2022       Clinical Information  Pre-Op Diagnosis:  BABY GIRL @ 1425, GEST AGE: 39, APGAR: 9/9, WEIGHT:  2462 G, 5LBS. 7 OZ. Operative Findings:  PLACENTA; RIGHT TUBE; LEFT TUBE    Source of Specimen  A: PLACENTA  B: RIGHT TUBE  C: LEFT TUBE    Gross Description  A. Marta Manner, UNDESIGNATED\" Placenta with attached membranes  and umbilical cord. UMBILICAL CORD         Length:     26.0 cm       Diameter:     1.1 cm  True knots:     No  Number of vessels:     3  Spiraling:     Normal  Insertion into fetal surface:     Paracentral    MEMBRANES  Color:     Gray-green and opacified with a marginal in sertion  Meconium staining:     Yes    FETAL SURFACE  Color:     Purple-gray, with a normal array of surface vessels  Subchorionic fibrin:     Patchy and marginal and involves  approximately 10% of the disc    MATERNAL SURFACE  Cotyledons:            -All present and intact:     Yes  -Focal lesions:     No    Placental size:     18.0 x 14.0 x 3.0 cm  Shape:     Ovoid  Weight:     350 grams  Number of cassettes:     3cs         B. \"RUBENS UVALDOFELICH, RIGHT TUBE\" 5.5 cm long x 0.6 cm diameter  fimbriated fallopian tube segment.   The serosa is pink-tan and the  lumen is unremarkable. Entirely 3cs. [place] Supa SELF, LEFT TUBE\" 4.5 x cm long x 0.5 cm diameter  fimbriated fallopian tube segment. The serosa is pink-tan and the  lumen is unremarkable. Entirely 2cs. tm      Microscopic Description  A.       Umbilical cord:     Unremarkable       Membranes:     Unremarkable  Meconium staining:     Macrophages in the amnion show cytoplasmic  brown pigment compatible with meconium. Infar cts:     No  Intervillous thrombi:     No  Subchorionic fibrin:     Not significantly increased  Villous maturation:     Appropriate  Nucleated erythrocytes in     villous capillaries:     Not increased  Other:     Few microcalcifications      B, C.  Microscopic examination performed. SURGICAL PATHOLOGY CONSULTATION       Patient Name: Kasey Gross: [id number]  Path Number: [id number]    [facility] [address] [facility],  [address]  [phone]  Fax: [phone]     TYPE AND SCREEN   Result Value Ref Range    Expiration Date 2022,2359     Arm Band Number [id number]     ABO/Rh O POSITIVE     Antibody Screen NEGATIVE          Assessment:   Diagnosis Orders   1. S/P repeat low transverse        Patient Active Problem List    Diagnosis Date Noted    S/P repeat low transverse  2022    Short femur of fetus on prenatal ultrasound 2022    Other specified personal risk factors, not elsewhere classified 2021     Discussed at length the risks and benefits of concurrent ovarian cancer risk reducing bilateral salpingectomy with patient's upcoming scheduled abdominal or pelvic surgery as this patient is at average risk for development of ovarian cancer. Advised patient that the primary benefit of such surgery is up to a 65% reduction in future risk of ovarian cancer.  Finally, patient has been thoroughly counseled regarding the consequence of the loss of fertility

## 2022-04-04 NOTE — PROGRESS NOTES
[address] [place] [address],[address] [facility] [place]  [place]  [address] [address]  Dept: [phone]    Patient Name: Vladislav Orozco  Patient Age: 28 y.o. Date of Visit: 2022    Chief Complaint   Patient presents with    Postpartum Care       HPI:  DELIVERY DATE: 22  TYPE OF DELIVERY: repeat  section  PROVIDER: Dr. Yi Guadlaupe: joe Frost was seen for her 6 week visit. She declines concerns and reports she is feeling well. Her Female infant is healthy. She is bottle feeding. She is bottle feeding without difficulty. She is not experiencing pain. She is rating her pain 0/10. She reports her lochia is light with just some occasional brown discharge. She reports none perineal discomfort. She denies urinary incontinence. Her bowels have returned to her normal pattern. She is back to her normal activity pattern. She has not her first menses. Dalton Frost has not engaged in intercourse. She does not report a mood disorder. She feels she is having difficulty coping. Dalton Frost feels her family adjustment is effective. She reports an overall positive birth experience. OBJECTIVE:   Wt Readings from Last 3 Encounters:   22 109 lb 9 oz (49.7 kg)   22 113 lb (51.3 kg)   03/15/22 109 lb (49.4 kg)       Blood pressure 108/64, weight 109 lb 9 oz (49.7 kg), last menstrual period 2021, currently breastfeeding. Breast exam: Deferred    Abdomen: Soft non-tender without guarding. There is diastasis present. The diastasis is 1 finger breaths of separation.  section site intact without redness or swelling. Extremities: No calf tenderness bilaterally. No edema. ASSESSMENT/PLAN:    6 week postpartum visit  · She will return for an annual exam in 1 year.    · Date of last pap: 10/2020 - normal     Smoker/non-smoker:   Secondary smoke risks were reviewed, including increased risks of respiratory problems, Sudden Infant Death Syndrome, and potential malignancies.  Smoking cessation counseling:  Patient started Wellbutrin to aid in cessation. Positive reinforcement offered. Family planning needs  · Risk reducing bilateral salpingo at delivery. Patient was seen with total face to face time of 15 minutes.   More than 50% of this visit was counseling and education regarding her Post Partum visit

## 2022-04-11 ENCOUNTER — OFFICE VISIT (OUTPATIENT)
Dept: PRIMARY CARE CLINIC | Age: 32
End: 2022-04-11
Payer: COMMERCIAL

## 2022-04-11 VITALS
SYSTOLIC BLOOD PRESSURE: 106 MMHG | WEIGHT: 113 LBS | DIASTOLIC BLOOD PRESSURE: 70 MMHG | RESPIRATION RATE: 14 BRPM | HEART RATE: 80 BPM | BODY MASS INDEX: 20.67 KG/M2 | OXYGEN SATURATION: 99 %

## 2022-04-11 DIAGNOSIS — F41.9 ANXIETY: ICD-10-CM

## 2022-04-11 DIAGNOSIS — Z87.891 PERSONAL HISTORY OF TOBACCO USE, PRESENTING HAZARDS TO HEALTH: ICD-10-CM

## 2022-04-11 DIAGNOSIS — F32.A MILD DEPRESSION: Primary | ICD-10-CM

## 2022-04-11 PROCEDURE — 99406 BEHAV CHNG SMOKING 3-10 MIN: CPT | Performed by: NURSE PRACTITIONER

## 2022-04-11 PROCEDURE — 99214 OFFICE O/P EST MOD 30 MIN: CPT | Performed by: NURSE PRACTITIONER

## 2022-04-11 RX ORDER — BUPROPION HYDROCHLORIDE 150 MG/1
150 TABLET, EXTENDED RELEASE ORAL 2 TIMES DAILY
Qty: 60 TABLET | Refills: 2 | Status: SHIPPED | OUTPATIENT
Start: 2022-04-11

## 2022-04-11 ASSESSMENT — PATIENT HEALTH QUESTIONNAIRE - PHQ9
4. FEELING TIRED OR HAVING LITTLE ENERGY: 1
2. FEELING DOWN, DEPRESSED OR HOPELESS: 0
6. FEELING BAD ABOUT YOURSELF - OR THAT YOU ARE A FAILURE OR HAVE LET YOURSELF OR YOUR FAMILY DOWN: 0
8. MOVING OR SPEAKING SO SLOWLY THAT OTHER PEOPLE COULD HAVE NOTICED. OR THE OPPOSITE, BEING SO FIGETY OR RESTLESS THAT YOU HAVE BEEN MOVING AROUND A LOT MORE THAN USUAL: 0
SUM OF ALL RESPONSES TO PHQ9 QUESTIONS 1 & 2: 0
SUM OF ALL RESPONSES TO PHQ QUESTIONS 1-9: 1
1. LITTLE INTEREST OR PLEASURE IN DOING THINGS: 0
9. THOUGHTS THAT YOU WOULD BE BETTER OFF DEAD, OR OF HURTING YOURSELF: 0
7. TROUBLE CONCENTRATING ON THINGS, SUCH AS READING THE NEWSPAPER OR WATCHING TELEVISION: 0
SUM OF ALL RESPONSES TO PHQ QUESTIONS 1-9: 1
3. TROUBLE FALLING OR STAYING ASLEEP: 0
5. POOR APPETITE OR OVEREATING: 0
SUM OF ALL RESPONSES TO PHQ QUESTIONS 1-9: 1
10. IF YOU CHECKED OFF ANY PROBLEMS, HOW DIFFICULT HAVE THESE PROBLEMS MADE IT FOR YOU TO DO YOUR WORK, TAKE CARE OF THINGS AT HOME, OR GET ALONG WITH OTHER PEOPLE: 0
SUM OF ALL RESPONSES TO PHQ QUESTIONS 1-9: 1

## 2022-04-11 ASSESSMENT — ENCOUNTER SYMPTOMS
SINUS PRESSURE: 0
COUGH: 0
DIARRHEA: 0
EYE REDNESS: 0
EYE DISCHARGE: 0
SORE THROAT: 0
WHEEZING: 0
CHEST TIGHTNESS: 0
TROUBLE SWALLOWING: 0
VOMITING: 0
SINUS PAIN: 0
NAUSEA: 0
ABDOMINAL PAIN: 0
EYE ITCHING: 0
SHORTNESS OF BREATH: 0

## 2022-04-11 NOTE — PATIENT INSTRUCTIONS
Stopping Smoking: Care Instructions  Your Care Instructions     Cigarette smokers crave the nicotine in cigarettes. Giving it up is much harder than simply changing a habit. Your body has to stop craving the nicotine. It is hard to quit, but you can do it. There are many tools that people use to quitsmoking. You may find that combining tools works best for you. There are several steps to quitting. First you get ready to quit. Then you get support to help you. After that, you learn new skills and behaviors to become anonsmoker. For many people, a necessary step is getting and using medicine. Your doctor will help you set up the plan that best meets your needs. You may want to attend a smoking cessation program to help you quit smoking. When you choose a program, look for one that has proven success. Ask your doctor for ideas. You will greatly increase your chances of success if you take medicineas well as get counseling or join a cessation program.  Some of the changes you feel when you first quit tobacco are uncomfortable. Your body will miss the nicotine at first, and you may feel short-tempered and grumpy. You may have trouble sleeping or concentrating. Medicine can help you deal with these symptoms. You may struggle with changing your smoking habits and rituals. The last step is the tricky one: Be prepared for the smoking urge to continue for a time. This is a lot to deal with, but keep at it. You willfeel better. Follow-up care is a key part of your treatment and safety. Be sure to make and go to all appointments, and call your doctor if you are having problems. It's also a good idea to know your test results and keep alist of the medicines you take. How can you care for yourself at home?  Ask your family, friends, and coworkers for support. You have a better chance of quitting if you have help and support.    Join a support group, such as Nicotine Anonymous, for people who are trying to quit smoking.  Consider signing up for a smoking cessation program, such as the American Lung Association's Freedom from Smoking program.   Get text messaging support. Go to the website at www.smokefree. gov to sign up for the [facility] program.   Set a quit date. Pick your date carefully so that it is not right in the middle of a big deadline or stressful time. Once you quit, do not even take a puff. Get rid of all ashtrays and lighters after your last cigarette. Clean your house and your clothes so that they do not smell of smoke.  Learn how to be a nonsmoker. Think about ways you can avoid those things that make you reach for a cigarette. ? Avoid situations that put you at greatest risk for smoking. For some people, it is hard to have a drink with friends without smoking. For others, they might skip a coffee break with coworkers who smoke. ? Change your daily routine. Take a different route to work or eat a meal in a different place.  Cut down on stress. Calm yourself or release tension by doing an activity you enjoy, such as reading a book, taking a hot bath, or gardening.  Talk to your doctor or pharmacist about nicotine replacement therapy, which replaces the nicotine in your body. You still get nicotine but you do not use tobacco. Nicotine replacement products help you slowly reduce the amount of nicotine you need. These products come in several forms, many of them available over-the-counter:  ? Nicotine patches  ? Nicotine gum and lozenges  ? Nicotine inhaler   Ask your doctor about bupropion (Wellbutrin) or varenicline (Chantix), which are prescription medicines. They do not contain nicotine. They help you by reducing withdrawal symptoms, such as stress and anxiety.  Some people find hypnosis, acupuncture, and massage helpful for ending the smoking habit.  Eat a healthy diet and get regular exercise. Having healthy habits will help your body move past its craving for nicotine.    Be prepared to keep trying. Most people are not successful the first few times they try to quit. Do not get mad at yourself if you smoke again. Make a list of things you learned and think about when you want to try again, such as next week, next month, or next year. Where can you learn more? Go to https://chpepiceweb.Epiphyte. org and sign in to your ActionFlow account. Enter L005 in the Atzip box to learn more about \"Stopping Smoking: Care Instructions. \"     If you do not have an account, please click on the \"Sign Up Now\" link. Current as of: October 28, 2021               Content Version: 13.2  © 2006-2022 Healthwise, Incorporated. Care instructions adapted under license by [facility] If you have questions about a medical condition or this instruction, always ask your healthcare professional. [address] any warranty or liability for your use of this information.

## 2022-04-11 NOTE — PROGRESS NOTES
[facility] PRIMARY CARE  [address]   [address]  [address] [address]  Dept: [phone]  Dept Fax: [phone]    Herlinda Reynolds is a 28 y.o. female who presents today for her medical conditions/complaintsas noted below. Herlinda Reynolds is c/o of Depression (1mo f/u, wants in increase in dosage)        HPI:     Pt presents for a one month follow up  bp stable  Weight stable. Patient presents for 1 month follow-up. He states that she is doing well on the Wellbutrin for her depression. However she states that she is still having difficulty quitting smoking. She had been on this in the past for smoking cessation and was able to quit. She is unsure what dose she was on. She has cut back though to about 2 cigarettes/day but the cravings still there. Denies any side effects. No suicidal homicidal ideations. No other concerns today. She brought her 11 week old baby girl in.  She is doing great       Past Medical History:   Diagnosis Date    Anxiety and depression 10/8/2020    Depression     Gastroesophageal reflux disease 10/8/2020    Gestational diabetes mellitus (GDM), antepartum 2019    IBS (irritable bowel syndrome)     Mental disorder     BIPOLAR    Pnctr w/o FB of r idx fngr w/o damage to nail, subs 2019    TSH deficiency 7/10/2019      Past Surgical History:   Procedure Laterality Date     SECTION       SECTION N/A 2020     SECTION performed by Judy Burrell DO at [facility] [address] N/A 2022    REPEAT  SECTION W/RISK REDUCTION SALPINGECTOMY BILATERAL performed by Anuradha Jamison DO at [facility] OR       Family History   Problem Relation Age of Onset    Hypertension Father     Arthritis Mother         FIBROMYALGIA, BLOOD TRANSFUSIONS, VARICOSE VEINS    Pancreatic Cancer Paternal Grandfather     Lung Cancer Paternal Grandfather        Social History     Tobacco Use  Smoking status: Former Smoker     Packs/day: 0.25     Years: 13.00     Pack years: 3.25     Types: Cigarettes     Start date: 2002     Quit date: 2021     Years since quittin.2    Smokeless tobacco: Never Used    Tobacco comment: quit 8 months ago- 21   Substance Use Topics    Alcohol use: Not Currently     Comment: occasionally      Current Outpatient Medications   Medication Sig Dispense Refill    buPROPion (WELLBUTRIN SR) 150 MG extended release tablet Take 1 tablet by mouth 2 times daily 1 tablet daily for the first 3 days, then increase to 1 tablet twice daily. 60 tablet 2    ibuprofen (ADVIL;MOTRIN) 800 MG tablet Take 1 tablet by mouth every 8 hours 120 tablet 0     No current facility-administered medications for this visit. Allergies   Allergen Reactions    Arestin [Minocycline] Hives       Health Maintenance   Topic Date Due    Varicella vaccine (1 of 2 - 2-dose childhood series) Never done    COVID-19 Vaccine (1) 2023 (Originally 3/14/1995)    Depression Monitoring  2023    Cervical cancer screen  10/20/2025    DTaP/Tdap/Td vaccine (3 - Td or Tdap) 2032    Flu vaccine  Completed    Hepatitis C screen  Completed    HIV screen  Completed    Hepatitis A vaccine  Aged Out    Hepatitis B vaccine  Aged Out    Hib vaccine  Aged Out    Meningococcal (ACWY) vaccine  Aged Out    Pneumococcal 0-64 years Vaccine  Aged Out       :     Review of Systems   Constitutional: Negative for chills, fatigue and fever. HENT: Negative for ear discharge, ear pain, sinus pressure, sinus pain, sore throat and trouble swallowing. Eyes: Negative for discharge, redness and itching. Respiratory: Negative for cough, chest tightness, shortness of breath and wheezing. Cardiovascular: Negative for chest pain. Gastrointestinal: Negative for abdominal pain, diarrhea, nausea and vomiting. Genitourinary: Negative for difficulty urinating.    Musculoskeletal: Negative for Wellbutrin to 150 mg SL twice daily. New prescription sent in  -Patient is ready to quit. Discussed smoking cessation. She is to follow-up in 1 month  Orders Placed This Encounter   Procedures    IN TOBACCO USE CESSATION INTERMEDIATE 3-10 MINUTES [32818]     Orders Placed This Encounter   Medications    buPROPion (WELLBUTRIN SR) 150 MG extended release tablet     Sig: Take 1 tablet by mouth 2 times daily 1 tablet daily for the first 3 days, then increase to 1 tablet twice daily. Dispense:  60 tablet     Refill:  2       Patient given educational materials - seepatient instructions. Discussed use, benefit, and side effects of prescribed medications. All patient questions answered. Pt voiced understanding. Reviewed health maintenance. Instructed to continue current medications, diet and exercise. Patient agreedwith treatment plan. Follow up as directed. Electronically signed by GRACIELA Holt CNP on 4/11/2022at 12:32 PM            Tobacco Cessation Counseling: Patient advised about behavior change, including information about personal health harms, usage of appropriate cessation measures and benefits of cessation.   Time spent (minutes): 3-10

## 2022-04-12 ENCOUNTER — POSTPARTUM VISIT (OUTPATIENT)
Dept: OBGYN CLINIC | Age: 32
End: 2022-04-12

## 2022-04-12 VITALS
BODY MASS INDEX: 20.04 KG/M2 | WEIGHT: 109.56 LBS | SYSTOLIC BLOOD PRESSURE: 108 MMHG | DIASTOLIC BLOOD PRESSURE: 64 MMHG

## 2022-04-12 PROCEDURE — 0503F POSTPARTUM CARE VISIT: CPT

## 2022-05-11 ENCOUNTER — OFFICE VISIT (OUTPATIENT)
Dept: PRIMARY CARE CLINIC | Age: 32
End: 2022-05-11
Payer: COMMERCIAL

## 2022-05-11 VITALS
OXYGEN SATURATION: 99 % | BODY MASS INDEX: 20.56 KG/M2 | DIASTOLIC BLOOD PRESSURE: 66 MMHG | SYSTOLIC BLOOD PRESSURE: 92 MMHG | RESPIRATION RATE: 12 BRPM | HEART RATE: 65 BPM | WEIGHT: 112.4 LBS

## 2022-05-11 DIAGNOSIS — H92.01 RIGHT EAR PAIN: ICD-10-CM

## 2022-05-11 DIAGNOSIS — F17.200 SMOKER: ICD-10-CM

## 2022-05-11 DIAGNOSIS — F32.A MILD DEPRESSION: Primary | ICD-10-CM

## 2022-05-11 PROCEDURE — 99214 OFFICE O/P EST MOD 30 MIN: CPT | Performed by: NURSE PRACTITIONER

## 2022-05-11 RX ORDER — PREDNISONE 10 MG/1
TABLET ORAL
Qty: 20 TABLET | Refills: 0 | Status: SHIPPED | OUTPATIENT
Start: 2022-05-11 | End: 2022-05-21

## 2022-05-11 ASSESSMENT — PATIENT HEALTH QUESTIONNAIRE - PHQ9
10. IF YOU CHECKED OFF ANY PROBLEMS, HOW DIFFICULT HAVE THESE PROBLEMS MADE IT FOR YOU TO DO YOUR WORK, TAKE CARE OF THINGS AT HOME, OR GET ALONG WITH OTHER PEOPLE: 0
SUM OF ALL RESPONSES TO PHQ QUESTIONS 1-9: 0
1. LITTLE INTEREST OR PLEASURE IN DOING THINGS: 0
SUM OF ALL RESPONSES TO PHQ QUESTIONS 1-9: 0
3. TROUBLE FALLING OR STAYING ASLEEP: 0
SUM OF ALL RESPONSES TO PHQ QUESTIONS 1-9: 0
2. FEELING DOWN, DEPRESSED OR HOPELESS: 0
4. FEELING TIRED OR HAVING LITTLE ENERGY: 0
7. TROUBLE CONCENTRATING ON THINGS, SUCH AS READING THE NEWSPAPER OR WATCHING TELEVISION: 0
SUM OF ALL RESPONSES TO PHQ QUESTIONS 1-9: 0
8. MOVING OR SPEAKING SO SLOWLY THAT OTHER PEOPLE COULD HAVE NOTICED. OR THE OPPOSITE, BEING SO FIGETY OR RESTLESS THAT YOU HAVE BEEN MOVING AROUND A LOT MORE THAN USUAL: 0
9. THOUGHTS THAT YOU WOULD BE BETTER OFF DEAD, OR OF HURTING YOURSELF: 0
6. FEELING BAD ABOUT YOURSELF - OR THAT YOU ARE A FAILURE OR HAVE LET YOURSELF OR YOUR FAMILY DOWN: 0
5. POOR APPETITE OR OVEREATING: 0
SUM OF ALL RESPONSES TO PHQ9 QUESTIONS 1 & 2: 0

## 2022-05-11 ASSESSMENT — ENCOUNTER SYMPTOMS
EYE REDNESS: 0
DIARRHEA: 0
SINUS PRESSURE: 0
ABDOMINAL PAIN: 0
COUGH: 1
EYE ITCHING: 0
WHEEZING: 0
CHEST TIGHTNESS: 0
SINUS PAIN: 0
VOMITING: 0
TROUBLE SWALLOWING: 0
NAUSEA: 0
SHORTNESS OF BREATH: 0
EYE DISCHARGE: 0
SORE THROAT: 0

## 2022-05-11 NOTE — PROGRESS NOTES
[facility] PRIMARY CARE  . [address]  [address] [address]  Dept: [phone]  Dept Fax: [phone]    Lisseth Parra is a 28 y.o. female who presents today for her medical conditions/complaintsas noted below. Lisseth Parra is c/o of Depression (1 mo f/u for smoking), Pharyngitis (still has a mild sore throat), and Ear Fullness (still having hard time hearing out of rt ear)        HPI:     Patient presents for a 1 month follow-up  Blood pressure stable  Weight is stable    Patient presents for a 1 month follow-up. She is 15 days smoke-free. She could tell a big difference increasing dose to 150 mg sustained release twice a day. Denies any depression. She started having right ear pain a week or so ago. She went to urgent care. She was given a z pack. The congestion has gotten better but she still continues to have right ear discomfort.   She was told that she had a bilateral ear infection  She still has a cough           Past Medical History:   Diagnosis Date    Anxiety and depression 10/8/2020    Depression     Gastroesophageal reflux disease 10/8/2020    Gestational diabetes mellitus (GDM), antepartum 2019    IBS (irritable bowel syndrome)     Mental disorder     BIPOLAR    Pnctr w/o FB of r idx fngr w/o damage to nail, subs 2019    TSH deficiency 7/10/2019      Past Surgical History:   Procedure Laterality Date     SECTION       SECTION N/A 2020     [facility] [address] N/A 2022    REPEAT  SECTION W/RISK REDUCTION SALPINGECTOMY BILATERAL performed by Jeffrey Pennington DO at [facility] L[facility]D OR       Family History   Problem Relation Age of Onset    Hypertension Father     Arthritis Mother         FIBROMYALGIA, BLOOD TRANSFUSIONS, VARICOSE VEINS    Pancreatic Cancer Paternal Grandfather     Lung Cancer Paternal Grandfather Social History     Tobacco Use    Smoking status: Former Smoker     Packs/day: 0.25     Years: 13.00     Pack years: 3.25     Types: Cigarettes     Start date: 2002     Quit date: 2021     Years since quittin.3    Smokeless tobacco: Never Used    Tobacco comment: quit 8 months ago- 21   Substance Use Topics    Alcohol use: Not Currently     Comment: occasionally      Current Outpatient Medications   Medication Sig Dispense Refill    predniSONE (DELTASONE) 10 MG tablet Take 4 tablets by mouth once daily for 5 days 20 tablet 0    buPROPion (WELLBUTRIN SR) 150 MG extended release tablet Take 1 tablet by mouth 2 times daily 1 tablet daily for the first 3 days, then increase to 1 tablet twice daily. 60 tablet 2     No current facility-administered medications for this visit. Allergies   Allergen Reactions    Arestin [Minocycline] Hives       Health Maintenance   Topic Date Due    Varicella vaccine (1 of 2 - 2-dose childhood series) Never done    COVID-19 Vaccine (1) 2023 (Originally 3/14/1995)    Depression Monitoring  2023    Cervical cancer screen  10/20/2025    DTaP/Tdap/Td vaccine (3 - Td or Tdap) 2032    Flu vaccine  Completed    Hepatitis C screen  Completed    HIV screen  Completed    Hepatitis A vaccine  Aged Out    Hepatitis B vaccine  Aged Out    Hib vaccine  Aged Out    Meningococcal (ACWY) vaccine  Aged Out    Pneumococcal 0-64 years Vaccine  Aged Out       :     Review of Systems   Constitutional: Negative for chills, fatigue and fever. HENT: Positive for ear pain. Negative for ear discharge, sinus pressure, sinus pain, sore throat and trouble swallowing. Eyes: Negative for discharge, redness and itching. Respiratory: Positive for cough. Negative for chest tightness, shortness of breath and wheezing. Cardiovascular: Negative for chest pain. Gastrointestinal: Negative for abdominal pain, diarrhea, nausea and vomiting. Genitourinary: Negative for difficulty urinating. Musculoskeletal: Negative for arthralgias and neck pain. Skin: Negative for rash. Neurological: Negative for dizziness, weakness, light-headedness and headaches. All other systems reviewed and are negative. Objective:     Physical Exam  Constitutional:       Appearance: Normal appearance. She is normal weight. HENT:      Head: Normocephalic and atraumatic. Right Ear: A middle ear effusion is present. Left Ear: Tympanic membrane normal.      Ears:      Comments: Minimal erythremia to the right TM     Nose: Nose normal.   Eyes:      Extraocular Movements: Extraocular movements intact. Conjunctiva/sclera: Conjunctivae normal.      Pupils: Pupils are equal, round, and reactive to light. Cardiovascular:      Rate and Rhythm: Normal rate and regular rhythm. Pulses: Normal pulses. Heart sounds: Normal heart sounds. Pulmonary:      Effort: Pulmonary effort is normal.      Breath sounds: Normal breath sounds. Abdominal:      General: Abdomen is flat. Palpations: Abdomen is soft. Musculoskeletal:         General: Normal range of motion. Cervical back: Neck supple. Skin:     General: Skin is warm and dry. Capillary Refill: Capillary refill takes less than 2 seconds. Neurological:      General: No focal deficit present. Mental Status: She is alert and oriented to person, place, and time. Psychiatric:         Mood and Affect: Mood normal.       BP 92/66   Pulse 65   Resp 12   Wt 112 lb 6.4 oz (51 kg)   SpO2 99%   Breastfeeding No   BMI 20.56 kg/m²     Assessment:       Diagnosis Orders   1. Mild depression ([address]     2. Smoker     3. Right ear pain         :      Return in about 3 months (around 8/11/2022) for smoking/depression . 1.-2.depression and anxiety   -smoke free for 15 days  -depression is controlled  -c/w wellbutrin 150mg SR bid for the next 3 months  3.  Right ear pain  -agreeable to prednisone 40mg daily x 5 days  -will hold off on further antibiotics. Would consider amoxicillin     F/u in 3months or sooner as needed. Orders Placed This Encounter   Medications    predniSONE (DELTASONE) 10 MG tablet     Sig: Take 4 tablets by mouth once daily for 5 days     Dispense:  20 tablet     Refill:  0       Patient given educational materials - seepatient instructions. Discussed use, benefit, and side effects of prescribed medications. All patient questions answered. Pt voiced understanding. Reviewed health maintenance. Instructed to continue current medications, diet and exercise. Patient agreedwith treatment plan. Follow up as directed.       Electronically signed by GRACIELA Romo CNP on 5/11/2022at 11:45 AM

## 2022-11-17 ENCOUNTER — OFFICE VISIT (OUTPATIENT)
Dept: PRIMARY CARE CLINIC | Age: 32
End: 2022-11-17
Payer: COMMERCIAL

## 2022-11-17 VITALS
HEART RATE: 75 BPM | SYSTOLIC BLOOD PRESSURE: 94 MMHG | TEMPERATURE: 97.7 F | BODY MASS INDEX: 20.19 KG/M2 | OXYGEN SATURATION: 99 % | RESPIRATION RATE: 12 BRPM | DIASTOLIC BLOOD PRESSURE: 66 MMHG | WEIGHT: 110.4 LBS

## 2022-11-17 DIAGNOSIS — L81.9 DISCOLORATION OF SKIN: ICD-10-CM

## 2022-11-17 DIAGNOSIS — R68.84 JAW PAIN: Primary | ICD-10-CM

## 2022-11-17 PROCEDURE — 99214 OFFICE O/P EST MOD 30 MIN: CPT | Performed by: NURSE PRACTITIONER

## 2022-11-17 RX ORDER — PREDNISONE 10 MG/1
TABLET ORAL
Qty: 20 TABLET | Refills: 0 | Status: SHIPPED | OUTPATIENT
Start: 2022-11-17 | End: 2022-11-27

## 2022-11-17 RX ORDER — AMOXICILLIN AND CLAVULANATE POTASSIUM 875; 125 MG/1; MG/1
1 TABLET, FILM COATED ORAL 2 TIMES DAILY
Qty: 20 TABLET | Refills: 0 | Status: SHIPPED | OUTPATIENT
Start: 2022-11-17 | End: 2022-11-27

## 2022-11-17 RX ORDER — FLUCONAZOLE 150 MG/1
150 TABLET ORAL ONCE
Qty: 1 TABLET | Refills: 1 | Status: SHIPPED | OUTPATIENT
Start: 2022-11-17 | End: 2022-11-17

## 2022-11-17 ASSESSMENT — PATIENT HEALTH QUESTIONNAIRE - PHQ9: DEPRESSION UNABLE TO ASSESS: PT REFUSES

## 2022-11-17 NOTE — PROGRESS NOTES
[facility] PRIMARY CARE  . [address]  [address] [address]  Dept: [phone]  Dept Fax: [phone]    Sergio Crocker is a 28 y.o. female who presents today for her medical conditions/complaintsas noted below. Sergio Crocker is c/o of Jaw Pain (L side, x2-3 wks, pt reports ibuprofen not helping) and Skin Problem (R calf discoloration, noticed in shower about 2 wks ago, grazed area once and was tender, not tender at the moment)        HPI:     Patient presents for an office visit  Blood pressure stable  Weight is down 2 pound since last visit but stable    Patient resents for an office visit with concerns of jaw pain and a rash. She noticed a color change to her right lower leg a few weeks ago. She denies any trauma or injury. It does not seem to be getting better in time. No pain or swelling. No change of the color. She has never had this before. Denies anything new. No known allergies. Left jaw pain. No dental pain. This started 2 to 3 weeks ago  She sneezed and the pain started. It brought her to tears. No hx of TMJ. Tylenol and Motrin are not helping  Jaw has clicked on and off before. She has never had this pain before. Patient was unable to quit smoking. Wellbutrin did not help.       Past Medical History:   Diagnosis Date    Anxiety and depression 10/8/2020    Depression     Gastroesophageal reflux disease 10/8/2020    Gestational diabetes mellitus (GDM), antepartum 2019    IBS (irritable bowel syndrome)     Mental disorder     BIPOLAR    Pnctr w/o FB of r idx fngr w/o damage to nail, subs 2019    TSH deficiency 7/10/2019      Past Surgical History:   Procedure Laterality Date     SECTION       SECTION N/A 2020     SECTION performed by Oral Espinoza DO at [facility] OR     SECTION N/A 2022    REPEAT  SECTION W/RISK REDUCTION SALPINGECTOMY BILATERAL performed by Joesph Francisco DO at [facility]       Family History   Problem Relation Age of Onset    Hypertension Father     Arthritis Mother         FIBROMYALGIA, BLOOD TRANSFUSIONS, VARICOSE VEINS    Pancreatic Cancer Paternal Grandfather     Lung Cancer Paternal Grandfather        Social History     Tobacco Use    Smoking status: Former     Packs/day: 0.25     Years: 13.00     Pack years: 3.25     Types: Cigarettes     Start date: 2002     Quit date: 2021     Years since quittin.8    Smokeless tobacco: Never    Tobacco comments:     quit 8 months ago- 21   Substance Use Topics    Alcohol use: Not Currently     Comment: occasionally      Current Outpatient Medications   Medication Sig Dispense Refill    amoxicillin-clavulanate (AUGMENTIN) 875-125 MG per tablet Take 1 tablet by mouth 2 times daily for 10 days 20 tablet 0    predniSONE (DELTASONE) 10 MG tablet Take 4 tablets by mouth once daily for 5 days 20 tablet 0     No current facility-administered medications for this visit. Allergies   Allergen Reactions    Arestin [Minocycline] Hives       Health Maintenance   Topic Date Due    Varicella vaccine (1 of 2 - 2-dose childhood series) Never done    Flu vaccine (1) 2022    COVID-19 Vaccine (1) 2023 (Originally 1990)    Depression Monitoring  2023    Cervical cancer screen  10/20/2025    DTaP/Tdap/Td vaccine (3 - Td or Tdap) 2032    Hepatitis C screen  Completed    HIV screen  Completed    Hepatitis A vaccine  Aged Out    Hib vaccine  Aged Out    Meningococcal (ACWY) vaccine  Aged Out    Pneumococcal 0-64 years Vaccine  Aged Out       :     Review of Systems   Constitutional:  Negative for chills, fatigue and fever. HENT:  Negative for ear discharge, ear pain, rhinorrhea, sinus pressure, sinus pain, sore throat and trouble swallowing. Left-sided jaw pain   Eyes:  Negative for discharge, redness and itching.    Respiratory:  Negative for cough, chest tightness, shortness of breath and wheezing. Cardiovascular:  Negative for chest pain. Gastrointestinal:  Negative for abdominal pain, diarrhea, nausea and vomiting. Genitourinary:  Negative for difficulty urinating. Musculoskeletal:  Negative for arthralgias and neck pain. Skin:  Positive for color change. Negative for rash. Neurological:  Negative for dizziness, weakness, light-headedness and headaches. All other systems reviewed and are negative. Objective:     Physical Exam  Constitutional:       Appearance: Normal appearance. She is normal weight. HENT:      Head: Normocephalic and atraumatic. Nose: Nose normal.   Eyes:      Extraocular Movements: Extraocular movements intact. Conjunctiva/sclera: Conjunctivae normal.      Pupils: Pupils are equal, round, and reactive to light. Cardiovascular:      Rate and Rhythm: Normal rate and regular rhythm. Pulses: Normal pulses. Heart sounds: Normal heart sounds. Pulmonary:      Effort: Pulmonary effort is normal.      Breath sounds: Normal breath sounds. Abdominal:      General: Abdomen is flat. Palpations: Abdomen is soft. Musculoskeletal:         General: Normal range of motion. Cervical back: Neck supple. Skin:     General: Skin is warm and dry. Capillary Refill: Capillary refill takes less than 2 seconds. Neurological:      General: No focal deficit present. Mental Status: She is alert and oriented to person, place, and time. Psychiatric:         Mood and Affect: Mood normal.     BP 94/66   Pulse 75   Temp 97.7 °F (36.5 °C)   Resp 12   Wt 110 lb 6.4 oz (50.1 kg)   SpO2 99%   BMI 20.19 kg/m²     Assessment:       Diagnosis Orders   1. Jaw pain        2. Discoloration of skin            :      Return in about 4 months (around 3/13/2023) for physical .  1.  Jaw pain  Will treat with antibiotics for possible dental infection.   Rx for Augmentin 875-125 mg twice a day for 10 days  We will also give her steroids for a possible TMJ episode. Rx for prednisone 40 mg daily for 5 days  Patient also given Diflucan as she gets yeast infections with antibiotics. Rx for Diflucan 150 mg p.o. x1. May repeat in 3 days if needed  Encourage smoking cessation  Also recommended eating soft foods  2. Discoloration of skin  Discussed continue to monitor the area. There is no swelling or red flags for DVT. Patient is going to let me know if symptoms do not improve    Patient is going to follow-up in 4 months for her physical.  She may return sooner if her symptoms do not get better  Orders Placed This Encounter   Medications    amoxicillin-clavulanate (AUGMENTIN) 875-125 MG per tablet     Sig: Take 1 tablet by mouth 2 times daily for 10 days     Dispense:  20 tablet     Refill:  0    fluconazole (DIFLUCAN) 150 MG tablet     Sig: Take 1 tablet by mouth once for 1 dose     Dispense:  1 tablet     Refill:  1    predniSONE (DELTASONE) 10 MG tablet     Sig: Take 4 tablets by mouth once daily for 5 days     Dispense:  20 tablet     Refill:  0       Patient given educational materials - seepatient instructions. Discussed use, benefit, and side effects of prescribed medications. All patient questions answered. Pt voiced understanding. Reviewed health maintenance. Instructed to continue current medications, diet and exercise. Patient agreedwith treatment plan. Follow up as directed.       Electronically signed by GRACIELA Dempsey CNP on 11/18/2022at 8:22 AM

## 2022-11-18 ASSESSMENT — ENCOUNTER SYMPTOMS
CHEST TIGHTNESS: 0
RHINORRHEA: 0
ABDOMINAL PAIN: 0
EYE ITCHING: 0
VOMITING: 0
COLOR CHANGE: 1
WHEEZING: 0
COUGH: 0
SINUS PRESSURE: 0
EYE REDNESS: 0
DIARRHEA: 0
SINUS PAIN: 0
SHORTNESS OF BREATH: 0
NAUSEA: 0
SORE THROAT: 0
EYE DISCHARGE: 0
TROUBLE SWALLOWING: 0

## 2022-11-30 RX ORDER — METRONIDAZOLE 7.5 MG/G
GEL VAGINAL
Qty: 1 EACH | Refills: 0 | OUTPATIENT
Start: 2022-11-30

## 2022-11-30 NOTE — TELEPHONE ENCOUNTER
Called patient and she said that she will go and talk to her ob before setting up an appointment with them about about it instead.

## 2023-03-20 ENCOUNTER — TELEMEDICINE (OUTPATIENT)
Dept: PRIMARY CARE CLINIC | Age: 33
End: 2023-03-20
Payer: COMMERCIAL

## 2023-03-20 DIAGNOSIS — L81.9 DISCOLORATION OF SKIN: Primary | ICD-10-CM

## 2023-03-20 DIAGNOSIS — F32.0 MAJOR DEPRESSIVE DISORDER, SINGLE EPISODE, MILD (HCC): ICD-10-CM

## 2023-03-20 DIAGNOSIS — F17.200 SMOKER: ICD-10-CM

## 2023-03-20 PROBLEM — Z98.891 HISTORY OF CESAREAN DELIVERY: Status: RESOLVED | Noted: 2019-12-04 | Resolved: 2023-03-20

## 2023-03-20 PROBLEM — R82.90 ABNORMAL URINALYSIS: Status: RESOLVED | Noted: 2019-12-04 | Resolved: 2023-03-20

## 2023-03-20 PROCEDURE — 99214 OFFICE O/P EST MOD 30 MIN: CPT | Performed by: NURSE PRACTITIONER

## 2023-03-20 SDOH — ECONOMIC STABILITY: TRANSPORTATION INSECURITY
IN THE PAST 12 MONTHS, HAS LACK OF TRANSPORTATION KEPT YOU FROM MEETINGS, WORK, OR FROM GETTING THINGS NEEDED FOR DAILY LIVING?: NO

## 2023-03-20 SDOH — ECONOMIC STABILITY: FOOD INSECURITY: WITHIN THE PAST 12 MONTHS, THE FOOD YOU BOUGHT JUST DIDN'T LAST AND YOU DIDN'T HAVE MONEY TO GET MORE.: NEVER TRUE

## 2023-03-20 SDOH — ECONOMIC STABILITY: HOUSING INSECURITY
IN THE LAST 12 MONTHS, WAS THERE A TIME WHEN YOU DID NOT HAVE A STEADY PLACE TO SLEEP OR SLEPT IN A SHELTER (INCLUDING NOW)?: NO

## 2023-03-20 SDOH — ECONOMIC STABILITY: FOOD INSECURITY: WITHIN THE PAST 12 MONTHS, YOU WORRIED THAT YOUR FOOD WOULD RUN OUT BEFORE YOU GOT MONEY TO BUY MORE.: NEVER TRUE

## 2023-03-20 SDOH — ECONOMIC STABILITY: INCOME INSECURITY: HOW HARD IS IT FOR YOU TO PAY FOR THE VERY BASICS LIKE FOOD, HOUSING, MEDICAL CARE, AND HEATING?: SOMEWHAT HARD

## 2023-03-20 ASSESSMENT — ENCOUNTER SYMPTOMS
EYE ITCHING: 0
VOMITING: 0
COUGH: 0
NAUSEA: 0
SINUS PAIN: 0
EYE REDNESS: 0
DIARRHEA: 0
EYE DISCHARGE: 0
ABDOMINAL PAIN: 0
TROUBLE SWALLOWING: 0
SORE THROAT: 0
CHEST TIGHTNESS: 0
SHORTNESS OF BREATH: 0
SINUS PRESSURE: 0
WHEEZING: 0

## 2023-03-20 ASSESSMENT — PATIENT HEALTH QUESTIONNAIRE - PHQ9: DEPRESSION UNABLE TO ASSESS: PT REFUSES

## 2023-03-20 NOTE — PROGRESS NOTES
childhood series) Never done    Flu vaccine (1) 08/01/2022    COVID-19 Vaccine (1) 04/11/2023 (Originally 1990)    Depression Monitoring  05/11/2023    Cervical cancer screen  10/20/2025    DTaP/Tdap/Td vaccine (3 - Td or Tdap) 01/13/2032    Hepatitis C screen  Completed    HIV screen  Completed    Hepatitis A vaccine  Aged Out    Hib vaccine  Aged Out    Meningococcal (ACWY) vaccine  Aged Out    Pneumococcal 0-64 years Vaccine  Aged Out       PHYSICAL EXAMINATION:  [ INSTRUCTIONS:  \"[x]\" Indicates a positive item  \"[]\" Indicates a negative item  -- DELETE ALL ITEMS NOT EXAMINED]  Vital Signs: (As obtained by patient/caregiver or practitioner observation)    Constitutional: [x] Appears well-developed and well-nourished [x] No apparent distress      [] Abnormal-   Mental status  [x] Alert and awake  [x] Oriented to person/place/time [x]Able to follow commands      Eyes:  EOM    [x]  Normal  [] Abnormal-  Sclera  []  Normal  [] Abnormal -         Discharge []  None visible  [] Abnormal -    HENT:   [x] Normocephalic, atraumatic. [] Abnormal   [x] Mouth/Throat: Mucous membranes are moist.     External Ears [x] Normal  [] Abnormal-     Neck: [] No visualized mass     Pulmonary/Chest: [x] Respiratory effort normal.  [x] No visualized signs of difficulty breathing or respiratory distress        [] Abnormal-      Musculoskeletal:   [] Normal gait with no signs of ataxia         [x] Normal range of motion of neck        [] Abnormal-       Neurological:        [x] No Facial Asymmetry (Cranial nerve 7 motor function) (limited exam to video visit)          [] No gaze palsy        [] Abnormal-         Skin:        [x] No significant exanthematous lesions or discoloration noted on facial skin         [] Abnormal-            Psychiatric:       [x] Normal Affect [] No Hallucinations        [] Abnormal-     ASSESSMENT/PLAN:  1. Discoloration of skin  - [facility] - Li Vargas CNP, Dermatology, [facility]    2.  Major depressive

## 2023-09-27 ENCOUNTER — OFFICE VISIT (OUTPATIENT)
Dept: OBGYN CLINIC | Age: 33
End: 2023-09-27
Payer: COMMERCIAL

## 2023-09-27 VITALS — SYSTOLIC BLOOD PRESSURE: 110 MMHG | BODY MASS INDEX: 19.2 KG/M2 | WEIGHT: 105 LBS | DIASTOLIC BLOOD PRESSURE: 64 MMHG

## 2023-09-27 DIAGNOSIS — N93.9 ABNORMAL UTERINE BLEEDING (AUB): ICD-10-CM

## 2023-09-27 DIAGNOSIS — N92.0 MENORRHAGIA WITH REGULAR CYCLE: Primary | ICD-10-CM

## 2023-09-27 PROCEDURE — 99214 OFFICE O/P EST MOD 30 MIN: CPT | Performed by: OBSTETRICS & GYNECOLOGY

## 2023-09-28 RX ORDER — CHOLECALCIFEROL (VITAMIN D3) 125 MCG
1 CAPSULE ORAL DAILY
Qty: 90 CAPSULE | Refills: 4 | Status: SHIPPED | OUTPATIENT
Start: 2023-09-28

## 2023-09-28 NOTE — PROGRESS NOTES
Angel Butterfield      YOB: 1990    The patient was seen today. She is here regarding heavy periods and large clotting. Her bowels are regular and she is voiding without difficulty. HPI:  Lara Linares is a 35 y.o. female [id number]    She is doing well, she is PP from C/S x3 2023 and bilateral salpingectomy. She has been having heavy bleeding with large clots after her delivery. She states she will bleed very heavy for 5 days and the bleeding will last 10 days. During the heavy first 5 days she will have to change super tampon every 1 hour. AFter 10 days she will stop and then start spotting again for 3-4 more days. She is having sharp cramping with her period that is worse the first 5 days and then becomes more mild. She is taking NSAIDs around the clot in beginning of her periods without relief. During her heavy bleeding she will have fatigue and malaise and this makes it difficulty to complete her ADLs. Family history of paternal GF and uncle with colon cancer. Cycles are regular and 28 days in length. She has cramping after sex that is suprpubic in location and sometimes post coital spotting. Denies bowel or bladder dysfunction.       OB History    Para Term  AB Living   4 3 3 0 1 3   SAB IAB Ectopic Molar Multiple Live Births   1 0 0 0 0 3      # Outcome Date GA Lbr Melvin/2nd Weight Sex Delivery Anes PTL Lv   4 Term 22 39w1d  5 lb 6.8 oz (2.462 kg) F CS-LTranv Spinal N MANDY      Name: Yoan Gould: 9  Apgar5: 9   3 Term 20 38w0d  4 lb 6.1 oz (1.986 kg) M CS-LTranv Spinal N MANDY      Complications: IUGR (intrauterine growth restriction) affecting care of mother      Name: Angela Marquis: 9  Apgar5: 9   2 SAB 2019           1 Term 09 38w0d  5 lb 12 oz (2.608 kg) M CS-Unspec   MANDY      Name: Roz Moreno: 5  Apgar5: 9       Past Medical History:   Diagnosis Date

## 2023-10-09 ENCOUNTER — HOSPITAL ENCOUNTER (OUTPATIENT)
Age: 33
Discharge: HOME | End: 2023-10-09
Payer: COMMERCIAL

## 2023-10-09 ENCOUNTER — TELEPHONE (OUTPATIENT)
Dept: PRIMARY CARE CLINIC | Age: 33
End: 2023-10-09

## 2023-10-09 ENCOUNTER — HOSPITAL ENCOUNTER (OUTPATIENT)
Dept: WOMENS IMAGING | Age: 33
Discharge: HOME | End: 2023-10-11
Attending: OBSTETRICS & GYNECOLOGY
Payer: COMMERCIAL

## 2023-10-09 DIAGNOSIS — N92.0 MENORRHAGIA WITH REGULAR CYCLE: ICD-10-CM

## 2023-10-09 DIAGNOSIS — N93.9 ABNORMAL UTERINE BLEEDING (AUB): ICD-10-CM

## 2023-10-09 LAB
ALBUMIN SERPL-MCNC: 4.5 G/DL (ref 3.5–5.2)
ALP SERPL-CCNC: 64 U/L (ref 35–104)
ALT SERPL-CCNC: 10 U/L (ref 5–33)
ANION GAP SERPL CALCULATED.3IONS-SCNC: 11 MMOL/L (ref 9–17)
AST SERPL-CCNC: 17 U/L
BASOPHILS # BLD: 0.1 K/UL (ref 0–0.2)
BASOPHILS NFR BLD: 1 % (ref 0–2)
BILIRUB SERPL-MCNC: 0.4 MG/DL (ref 0.3–1.2)
BUN SERPL-MCNC: 8 MG/DL (ref 6–20)
CALCIUM SERPL-MCNC: 9.3 MG/DL (ref 8.6–10.4)
CHLORIDE SERPL-SCNC: 105 MMOL/L (ref 98–107)
CO2 SERPL-SCNC: 24 MMOL/L (ref 20–31)
CREAT SERPL-MCNC: 0.9 MG/DL (ref 0.5–0.9)
EOSINOPHIL # BLD: 0.1 K/UL (ref 0–0.4)
EOSINOPHILS RELATIVE PERCENT: 1 % (ref 0–4)
ERYTHROCYTE [DISTWIDTH] IN BLOOD BY AUTOMATED COUNT: 15.6 % (ref 11.5–14.9)
EST. AVERAGE GLUCOSE BLD GHB EST-MCNC: 82 MG/DL
GFR SERPL CREATININE-BSD FRML MDRD: >60 ML/MIN/1.73M2
GLUCOSE SERPL-MCNC: 114 MG/DL (ref 70–99)
HBA1C MFR BLD: 4.5 % (ref 4–6)
HCT VFR BLD AUTO: 40.4 % (ref 36–46)
HGB BLD-MCNC: 13.3 G/DL (ref 12–16)
INR PPP: 1
LYMPHOCYTES NFR BLD: 2.3 K/UL (ref 1–4.8)
LYMPHOCYTES RELATIVE PERCENT: 28 % (ref 24–44)
MCH RBC QN AUTO: 29.6 PG (ref 26–34)
MCHC RBC AUTO-ENTMCNC: 32.9 G/DL (ref 31–37)
MCV RBC AUTO: 89.9 FL (ref 80–100)
MONOCYTES NFR BLD: 0.6 K/UL (ref 0.1–1.3)
MONOCYTES NFR BLD: 8 % (ref 1–7)
NEUTROPHILS NFR BLD: 62 % (ref 36–66)
NEUTS SEG NFR BLD: 5.2 K/UL (ref 1.3–9.1)
PARTIAL THROMBOPLASTIN TIME: 27 SEC (ref 24–36)
PLATELET # BLD AUTO: 230 K/UL (ref 150–450)
PMV BLD AUTO: 7.9 FL (ref 6–12)
POTASSIUM SERPL-SCNC: 3.5 MMOL/L (ref 3.7–5.3)
PROLACTIN SERPL-MCNC: 3.2 NG/ML (ref 4.79–23.3)
PROT SERPL-MCNC: 7.4 G/DL (ref 6.4–8.3)
PROTHROMBIN TIME: 13.9 SEC (ref 11.8–14.6)
RBC # BLD AUTO: 4.49 M/UL (ref 4–5.2)
SODIUM SERPL-SCNC: 140 MMOL/L (ref 135–144)
T4 FREE SERPL-MCNC: 1.4 NG/DL (ref 0.9–1.7)
TSH SERPL DL<=0.05 MIU/L-ACNC: 0.24 UIU/ML (ref 0.3–5)
WBC OTHER # BLD: 8.3 K/UL (ref 3.5–11)

## 2023-10-09 PROCEDURE — 84443 ASSAY THYROID STIM HORMONE: CPT

## 2023-10-09 PROCEDURE — 36415 COLL VENOUS BLD VENIPUNCTURE: CPT

## 2023-10-09 PROCEDURE — 83036 HEMOGLOBIN GLYCOSYLATED A1C: CPT

## 2023-10-09 PROCEDURE — 84439 ASSAY OF FREE THYROXINE: CPT

## 2023-10-09 PROCEDURE — 84146 ASSAY OF PROLACTIN: CPT

## 2023-10-09 PROCEDURE — 85025 COMPLETE CBC W/AUTO DIFF WBC: CPT

## 2023-10-09 PROCEDURE — 80053 COMPREHEN METABOLIC PANEL: CPT

## 2023-10-09 PROCEDURE — 76856 US EXAM PELVIC COMPLETE: CPT

## 2023-10-09 PROCEDURE — 85610 PROTHROMBIN TIME: CPT

## 2023-10-09 PROCEDURE — 85730 THROMBOPLASTIN TIME PARTIAL: CPT

## 2023-10-09 NOTE — TELEPHONE ENCOUNTER
Patient called stating she just recently had blood work done for [address] [place] [address] Drive an would like PCP opinion also.

## 2023-11-01 ENCOUNTER — OFFICE VISIT (OUTPATIENT)
Dept: OBGYN CLINIC | Age: 33
End: 2023-11-01
Payer: COMMERCIAL

## 2023-11-01 ENCOUNTER — HOSPITAL ENCOUNTER (OUTPATIENT)
Age: 33
Setting detail: SPECIMEN
Discharge: HOME | End: 2023-11-01

## 2023-11-01 VITALS
DIASTOLIC BLOOD PRESSURE: 60 MMHG | SYSTOLIC BLOOD PRESSURE: 108 MMHG | BODY MASS INDEX: 19.57 KG/M2 | HEIGHT: 62 IN | WEIGHT: 106.38 LBS

## 2023-11-01 DIAGNOSIS — N93.9 ABNORMAL UTERINE BLEEDING (AUB): ICD-10-CM

## 2023-11-01 DIAGNOSIS — Z01.419 PAP SMEAR, AS PART OF ROUTINE GYNECOLOGICAL EXAMINATION: Primary | ICD-10-CM

## 2023-11-01 DIAGNOSIS — N92.0 MENORRHAGIA WITH REGULAR CYCLE: ICD-10-CM

## 2023-11-01 LAB
CANDIDA SPECIES: NEGATIVE
GARDNERELLA VAGINALIS: POSITIVE
SOURCE: ABNORMAL
TRICHOMONAS: NEGATIVE

## 2023-11-01 PROCEDURE — 99395 PREV VISIT EST AGE 18-39: CPT | Performed by: OBSTETRICS & GYNECOLOGY

## 2023-11-02 NOTE — PROGRESS NOTES
maintenance per patients PCP. Orders Placed This Encounter   Procedures    Vaginitis DNA Probe     Standing Status:   Future     Standing Expiration Date:   2024    Chlamydia/GC DNA, Thin Prep     Standing Status:   Future     Standing Expiration Date:   2024    PAP Smear     Patient History:    No LMP recorded. OBGYN Status: Having periods  Past Surgical History:  2009:  SECTION  2020:  SECTION; N/A      Comment:   SECTION performed by Heather Brunson DO                at [facility] L&D OR  2022:  SECTION; N/A      Comment:  REPEAT  SECTION W/RISK REDUCTION SALPINGECTOMY                BILATERAL performed by Billy Fall DO at [facility] L&D OR  No date: TUBAL LIGATION    Problem List        Edg Problems Affecting Cytology    Other specified personal risk factors, not elsewhere classified    Overview Signed 2021 12:29 PM by Uriah Adams DO     Discussed at length the risks and benefits of concurrent ovarian cancer   risk reducing bilateral salpingectomy with patient's upcoming scheduled   abdominal or pelvic surgery as this patient is at average risk for   development of ovarian cancer. Advised patient that the primary benefit of   such surgery is up to a 65% reduction in future risk of ovarian cancer. Finally, patient has been thoroughly counseled regarding the consequence   of the loss of fertility following this procedure. Patient understands   that this loss of fertility cannot be reversed and has expressed via   verbal and written consent that her wishes are to proceed with this   surgery for the purposes of reducing risk for ovarian cancer.            Social History    Tobacco Use      Smoking status: Every Day        Packs/day: 0.50        Years: 13.00        Additional pack years: 0.00        Total pack years: 6.50        Types: Cigarettes        Start date: 2002        Last attempt to quit: 1/10/2021        Years since quitting:

## 2023-11-03 LAB
C TRACH DNA SPEC QL PROBE+SIG AMP: NEGATIVE
HPV I/H RISK 4 DNA CVX QL NAA+PROBE: NOT DETECTED
HPV SAMPLE: NORMAL
HPV, INTERPRETATION: NORMAL
HPV16 DNA CVX QL NAA+PROBE: NOT DETECTED
HPV18 DNA CVX QL NAA+PROBE: NOT DETECTED
N GONORRHOEA DNA SPEC QL PROBE+SIG AMP: NEGATIVE
SPECIMEN DESCRIPTION: NORMAL
SPECIMEN DESCRIPTION: NORMAL

## 2023-11-07 ENCOUNTER — TELEPHONE (OUTPATIENT)
Dept: OBGYN CLINIC | Age: 33
End: 2023-11-07

## 2023-11-07 NOTE — TELEPHONE ENCOUNTER
Pt was made aware of results and recommendations. Pt would like to have metrogel sent to her pharmacy.

## 2023-11-07 NOTE — TELEPHONE ENCOUNTER
----- Message from Prashant Ceballos DO sent at 11/5/2023 12:03 PM EST -----  Please call patient and notify of +BV on cultures. Recommend Flagyl 500mg BID x7days vs. Metrogel x 7 days vs. Solosec x1 vs. Oakley Coral x1. Thank you.

## 2023-11-10 LAB — CYTOLOGY REPORT: NORMAL

## 2023-11-13 RX ORDER — METRONIDAZOLE 7.5 MG/G
1 GEL VAGINAL DAILY
Qty: 7 EACH | Refills: 0 | Status: SHIPPED | OUTPATIENT
Start: 2023-11-13 | End: 2023-11-20

## 2023-11-15 ENCOUNTER — TELEPHONE (OUTPATIENT)
Dept: OBGYN CLINIC | Age: 33
End: 2023-11-15

## 2023-11-15 NOTE — TELEPHONE ENCOUNTER
Dee Hernandez had pap done 11/1- she is still bleeding from exam. Not very heavy. Asking for your recommendations. She is currently taking the Slynd birth control.    Looks like her last period we have documented is 9/14/23

## 2023-11-18 NOTE — TELEPHONE ENCOUNTER
How heavy is her bleeding? If still bleeding we may need to get her in for exam and silver nitrate treatment or figure out if this is abnormal uterine bleeding as that is not typical that many days after a pap smear. If cultures were not done those may also be indicated. Thank you.

## 2023-11-24 ENCOUNTER — TELEPHONE (OUTPATIENT)
Dept: OBGYN CLINIC | Age: 33
End: 2023-11-24

## 2023-11-27 ASSESSMENT — PATIENT HEALTH QUESTIONNAIRE - PHQ9
4. FEELING TIRED OR HAVING LITTLE ENERGY: SEVERAL DAYS
5. POOR APPETITE OR OVEREATING: 0
9. THOUGHTS THAT YOU WOULD BE BETTER OFF DEAD, OR OF HURTING YOURSELF: 0
2. FEELING DOWN, DEPRESSED OR HOPELESS: NOT AT ALL
9. THOUGHTS THAT YOU WOULD BE BETTER OFF DEAD, OR OF HURTING YOURSELF: NOT AT ALL
3. TROUBLE FALLING OR STAYING ASLEEP: SEVERAL DAYS
SUM OF ALL RESPONSES TO PHQ QUESTIONS 1-9: 2
6. FEELING BAD ABOUT YOURSELF - OR THAT YOU ARE A FAILURE OR HAVE LET YOURSELF OR YOUR FAMILY DOWN: NOT AT ALL
4. FEELING TIRED OR HAVING LITTLE ENERGY: 1
8. MOVING OR SPEAKING SO SLOWLY THAT OTHER PEOPLE COULD HAVE NOTICED. OR THE OPPOSITE, BEING SO FIGETY OR RESTLESS THAT YOU HAVE BEEN MOVING AROUND A LOT MORE THAN USUAL: 0
SUM OF ALL RESPONSES TO PHQ9 QUESTIONS 1 & 2: 0
1. LITTLE INTEREST OR PLEASURE IN DOING THINGS: 0
2. FEELING DOWN, DEPRESSED OR HOPELESS: 0
1. LITTLE INTEREST OR PLEASURE IN DOING THINGS: NOT AT ALL
8. MOVING OR SPEAKING SO SLOWLY THAT OTHER PEOPLE COULD HAVE NOTICED. OR THE OPPOSITE - BEING SO FIDGETY OR RESTLESS THAT YOU HAVE BEEN MOVING AROUND A LOT MORE THAN USUAL: NOT AT ALL
7. TROUBLE CONCENTRATING ON THINGS, SUCH AS READING THE NEWSPAPER OR WATCHING TELEVISION: 0
6. FEELING BAD ABOUT YOURSELF - OR THAT YOU ARE A FAILURE OR HAVE LET YOURSELF OR YOUR FAMILY DOWN: 0
10. IF YOU CHECKED OFF ANY PROBLEMS, HOW DIFFICULT HAVE THESE PROBLEMS MADE IT FOR YOU TO DO YOUR WORK, TAKE CARE OF THINGS AT HOME, OR GET ALONG WITH OTHER PEOPLE: 0
SUM OF ALL RESPONSES TO PHQ QUESTIONS 1-9: 2
SUM OF ALL RESPONSES TO PHQ QUESTIONS 1-9: 2
10. IF YOU CHECKED OFF ANY PROBLEMS, HOW DIFFICULT HAVE THESE PROBLEMS MADE IT FOR YOU TO DO YOUR WORK, TAKE CARE OF THINGS AT HOME, OR GET ALONG WITH OTHER PEOPLE: NOT DIFFICULT AT ALL
3. TROUBLE FALLING OR STAYING ASLEEP: 1
7. TROUBLE CONCENTRATING ON THINGS, SUCH AS READING THE NEWSPAPER OR WATCHING TELEVISION: NOT AT ALL
5. POOR APPETITE OR OVEREATING: NOT AT ALL

## 2023-11-30 ENCOUNTER — OFFICE VISIT (OUTPATIENT)
Dept: OBGYN CLINIC | Age: 33
End: 2023-11-30
Payer: COMMERCIAL

## 2023-11-30 VITALS — HEIGHT: 62 IN | BODY MASS INDEX: 19.46 KG/M2 | DIASTOLIC BLOOD PRESSURE: 68 MMHG | SYSTOLIC BLOOD PRESSURE: 112 MMHG

## 2023-11-30 DIAGNOSIS — N93.9 ABNORMAL VAGINAL BLEEDING: Primary | ICD-10-CM

## 2023-11-30 DIAGNOSIS — Z90.79 HISTORY OF BILATERAL SALPINGECTOMY: ICD-10-CM

## 2023-11-30 DIAGNOSIS — Z98.891 HISTORY OF 2 CESAREAN SECTIONS: ICD-10-CM

## 2023-11-30 PROBLEM — O35.HXX0 SHORT FEMUR OF FETUS ON PRENATAL ULTRASOUND: Status: RESOLVED | Noted: 2022-01-13 | Resolved: 2023-11-30

## 2023-11-30 PROCEDURE — 99213 OFFICE O/P EST LOW 20 MIN: CPT | Performed by: STUDENT IN AN ORGANIZED HEALTH CARE EDUCATION/TRAINING PROGRAM

## 2023-11-30 NOTE — PROGRESS NOTES
[facility] Problem Visit    Eli Philip                         Primary Care Physician: Zoe Burch, APRN - CNP    CC:   Chief Complaint   Patient presents with    Vaginal Bleeding         HPI: Eli Philip is a 35 y.o. female [id number]     The patient was seen and examined. She is here with vaginal bleeding. Patient states she has had vaginal bleeding and spotting since Pap test.  When further discussed patient recently started using Slynd for prior heavy vaginal bleeding and passing clots. She has been on this for 2 months and recently experienced her second menstrual cycle while on Slynd however has been bleeding since that time which also coincided with Pap. Discussed most likely cause is breakthrough bleeding from [facility]. History b/l salpingectomy. Cultures during pap only positive for BV. Pap negative as well as cultures. No new partners or exposures.      Review of Systems:   Constitutional: negative fever, negative chills  Cardiovasc: negative dyspnea, negative cough, negative chest pain,  negative palpitations  Gastrointestinal: negative abdominal pain, negative RUQ pain, negative N/V, negative diarrhea, negative constipation  Genitourinary: negative dysuria, negative vaginal discharge, +VB  Hematologic: negative bruising  Immunologic/Lymphatic: negative recent illness, negative recent sick contact  Musculoskeletal: negative back pain, negative myalgias, negative arthralgias  Neurological:  negative dizziness, negative weakness  Behavior/Psych: negative depression, negative anxiety    Obstetrical History:  OB History    Para Term  AB Living   4 3 3 0 1 3   SAB IAB Ectopic Molar Multiple Live Births   1 0 0   0 3      # Outcome Date GA Lbr Melvin/2nd Weight Sex Delivery Anes PTL Lv   4 Term 22 39w1d  2.462 kg (5 lb 6.8 oz) F CS-LTranv Spinal N MANDY   3 Term 20 38w0d  1.986 kg (4 lb 6.1 oz) M CS-LTranv Spinal N MANDY      Complications: IUGR

## 2024-01-19 ENCOUNTER — TELEPHONE (OUTPATIENT)
Dept: PRIMARY CARE CLINIC | Age: 34
End: 2024-01-19

## 2024-01-19 ENCOUNTER — TELEPHONE (OUTPATIENT)
Dept: OBGYN CLINIC | Age: 34
End: 2024-01-19
Payer: COMMERCIAL

## 2024-01-19 DIAGNOSIS — N93.9 VAGINAL BLEEDING: Primary | ICD-10-CM

## 2024-01-19 PROCEDURE — 99441 PR PHYS/QHP TELEPHONE EVALUATION 5-10 MIN: CPT | Performed by: STUDENT IN AN ORGANIZED HEALTH CARE EDUCATION/TRAINING PROGRAM

## 2024-01-19 RX ORDER — TRANEXAMIC ACID 650 MG/1
650 TABLET ORAL 3 TIMES DAILY
Qty: 15 TABLET | Refills: 0 | Status: SHIPPED | OUTPATIENT
Start: 2024-01-19 | End: 2024-01-19

## 2024-01-19 RX ORDER — TRANEXAMIC ACID 650 MG/1
650 TABLET ORAL 3 TIMES DAILY
Qty: 15 TABLET | Refills: 0 | Status: SHIPPED | OUTPATIENT
Start: 2024-01-19 | End: 2024-01-24

## 2024-01-19 NOTE — TELEPHONE ENCOUNTER
She can continue with Slynd. Can try NDAIDs 600 mg every 6 hours for 7-10 days. If saturating a maxi pad per hour for 2 or more hours or passing blood clots the size of her hand then recommend ED for evaluation. Recommend being seen in the office for follow up from birth control. It looks like we discussed other options at the last visit. I will also send a script for TXA to her pharmacy that she will take 3x daily for 5 days if having heavy bleeding now. Thank you. Sent to nayan schmidt.     Dr. Hart

## 2024-01-19 NOTE — TELEPHONE ENCOUNTER
Ok. Unfortunately there is not much for me to do. Have her call Monday. If she can not get ahold of anyone then we can call. She can also try to send a [employer] message to their office

## 2024-01-19 NOTE — TELEPHONE ENCOUNTER
Patient called regarding ongoing issue with menses not matching up with birth control. Patient states that she has been trying to contact OB-GYN office today but couldn't get a hold of anyone. She has been seeing OB regarding the issue and had a pap smear done. Patient states that after the pap smear, she bled for 1 month straight. Patient having concerns about this issue still continuing and wanted advise from PCP since she wasn't able to get a hold of OB office    Writer informed patient that message will be sent to PCP for further advisement and advised to continue to try and contact OB office in the meantime. Patient verbalized understanding    Please advise

## 2024-01-19 NOTE — TELEPHONE ENCOUNTER
Pt stated she is menses is very off in relation to her pills. She stated she has not missed any pills, no new medications, no recent illness, no excess stress or exercise.    Pt stated the bleeding is painful 9/10, but not heavy bleeding.    Pt advised to go to ER but stated that she hasn't taken anything for the pain so she would rather not go to the Er. Pt is going to try ibuprofen first. Pt was advised if bleeding more than one pad an hour, fever, chills, to go to ER.    Pt would like to know if she should continue with her bc or discontinue.Please advise.

## 2024-01-22 NOTE — TELEPHONE ENCOUNTER
Spoke with the patient and informed of PCP's instructions, the patient verbalized understanding of PCP instructions.     Patient was able to get an appointment with her OBGYN for further evaluation.

## 2024-02-07 ENCOUNTER — OFFICE VISIT (OUTPATIENT)
Dept: OBGYN CLINIC | Age: 34
End: 2024-02-07
Payer: COMMERCIAL

## 2024-02-07 VITALS
WEIGHT: 111 LBS | DIASTOLIC BLOOD PRESSURE: 60 MMHG | BODY MASS INDEX: 20.43 KG/M2 | HEIGHT: 62 IN | SYSTOLIC BLOOD PRESSURE: 110 MMHG

## 2024-02-07 DIAGNOSIS — N93.9 ABNORMAL UTERINE BLEEDING (AUB): Primary | ICD-10-CM

## 2024-02-07 DIAGNOSIS — N94.6 DYSMENORRHEA: ICD-10-CM

## 2024-02-07 DIAGNOSIS — Z78.9 USES BIRTH CONTROL: ICD-10-CM

## 2024-02-07 PROCEDURE — 99212 OFFICE O/P EST SF 10 MIN: CPT | Performed by: STUDENT IN AN ORGANIZED HEALTH CARE EDUCATION/TRAINING PROGRAM

## 2024-02-07 NOTE — PROGRESS NOTES
gross abnormalities  Extremities: non-tender BLE and non-edematous  Psych:  oriented to time, place and person     Chaperone for Intimate Exam: Chaperone was present offered and patient declined.    ASSESSMENT & PLAN:    Lissette Butterfield is a 33 y.o. female  AUB    - continue slynd   - f/u November for annual     Patient Active Problem List    Diagnosis Date Noted    Major depressive disorder, single episode, mild (HCC) 2023     Priority: Medium    History of 2  sections 2023    History of bilateral salpingectomy 2023    Other specified personal risk factors, not elsewhere classified 2021     Discussed at length the risks and benefits of concurrent ovarian cancer risk reducing bilateral salpingectomy with patient's upcoming scheduled abdominal or pelvic surgery as this patient is at average risk for development of ovarian cancer. Advised patient that the primary benefit of such surgery is up to a 65% reduction in future risk of ovarian cancer. Finally, patient has been thoroughly counseled regarding the consequence of the loss of fertility following this procedure. Patient understands that this loss of fertility cannot be reversed and has expressed via verbal and written consent that her wishes are to proceed with this surgery for the purposes of reducing risk for ovarian cancer.         Encounter for prophylactic surgery for risk factor related to malignant neoplasm of ovary 2021     Discussed at length the risks and benefits of concurrent ovarian cancer risk reducing bilateral salpingectomy with patient's upcoming scheduled abdominal or pelvic surgery as this patient is at average risk for development of ovarian cancer. Advised patient that the primary benefit of such surgery is up to a 65% reduction in future risk of ovarian cancer. Finally, patient has been thoroughly counseled regarding the consequence of the loss of fertility following this procedure. Patient

## 2024-02-20 ENCOUNTER — OFFICE VISIT (OUTPATIENT)
Dept: PRIMARY CARE CLINIC | Age: 34
End: 2024-02-20
Payer: COMMERCIAL

## 2024-02-20 ENCOUNTER — HOSPITAL ENCOUNTER (OUTPATIENT)
Age: 34
Setting detail: SPECIMEN
Discharge: HOME | End: 2024-02-20

## 2024-02-20 VITALS
WEIGHT: 109.8 LBS | SYSTOLIC BLOOD PRESSURE: 102 MMHG | HEART RATE: 75 BPM | OXYGEN SATURATION: 99 % | RESPIRATION RATE: 14 BRPM | DIASTOLIC BLOOD PRESSURE: 64 MMHG | HEIGHT: 63 IN | BODY MASS INDEX: 19.45 KG/M2

## 2024-02-20 DIAGNOSIS — F32.0 MAJOR DEPRESSIVE DISORDER, SINGLE EPISODE, MILD (HCC): ICD-10-CM

## 2024-02-20 DIAGNOSIS — E05.90 HYPERTHYROIDISM: ICD-10-CM

## 2024-02-20 DIAGNOSIS — E55.9 VITAMIN D DEFICIENCY: ICD-10-CM

## 2024-02-20 DIAGNOSIS — E53.8 VITAMIN B 12 DEFICIENCY: ICD-10-CM

## 2024-02-20 DIAGNOSIS — Z00.00 ENCOUNTER FOR WELL ADULT EXAM WITHOUT ABNORMAL FINDINGS: Primary | ICD-10-CM

## 2024-02-20 DIAGNOSIS — F90.0 ATTENTION DEFICIT HYPERACTIVITY DISORDER (ADHD), PREDOMINANTLY INATTENTIVE TYPE: ICD-10-CM

## 2024-02-20 LAB
25(OH)D3 SERPL-MCNC: 8.3 NG/ML (ref 30–100)
FOLATE SERPL-MCNC: 7.9 NG/ML (ref 4.8–24.2)
T4 FREE SERPL-MCNC: 1.3 NG/DL (ref 0.93–1.7)
TSH SERPL DL<=0.05 MIU/L-ACNC: 0.67 UIU/ML (ref 0.27–4.2)
VIT B12 SERPL-MCNC: 303 PG/ML (ref 232–1245)

## 2024-02-20 PROCEDURE — 99213 OFFICE O/P EST LOW 20 MIN: CPT | Performed by: NURSE PRACTITIONER

## 2024-02-20 PROCEDURE — 99395 PREV VISIT EST AGE 18-39: CPT | Performed by: NURSE PRACTITIONER

## 2024-02-20 RX ORDER — ATOMOXETINE 18 MG/1
18 CAPSULE ORAL DAILY
Qty: 30 CAPSULE | Refills: 3 | Status: SHIPPED | OUTPATIENT
Start: 2024-02-20

## 2024-02-20 RX ORDER — AMOXICILLIN 500 MG/1
500 CAPSULE ORAL 2 TIMES DAILY
COMMUNITY

## 2024-02-20 ASSESSMENT — ENCOUNTER SYMPTOMS
DIARRHEA: 0
WHEEZING: 0
EYE ITCHING: 0
SINUS PAIN: 0
NAUSEA: 0
ABDOMINAL PAIN: 0
SINUS PRESSURE: 0
EYE REDNESS: 0
CHEST TIGHTNESS: 0
VOMITING: 0
SORE THROAT: 0
COUGH: 0
EYE DISCHARGE: 0
TROUBLE SWALLOWING: 0
SHORTNESS OF BREATH: 0

## 2024-02-20 ASSESSMENT — PATIENT HEALTH QUESTIONNAIRE - PHQ9
4. FEELING TIRED OR HAVING LITTLE ENERGY: 0
2. FEELING DOWN, DEPRESSED OR HOPELESS: 0
SUM OF ALL RESPONSES TO PHQ QUESTIONS 1-9: 0
8. MOVING OR SPEAKING SO SLOWLY THAT OTHER PEOPLE COULD HAVE NOTICED. OR THE OPPOSITE, BEING SO FIGETY OR RESTLESS THAT YOU HAVE BEEN MOVING AROUND A LOT MORE THAN USUAL: 0
SUM OF ALL RESPONSES TO PHQ QUESTIONS 1-9: 0
SUM OF ALL RESPONSES TO PHQ QUESTIONS 1-9: 0
7. TROUBLE CONCENTRATING ON THINGS, SUCH AS READING THE NEWSPAPER OR WATCHING TELEVISION: 0
1. LITTLE INTEREST OR PLEASURE IN DOING THINGS: 0
3. TROUBLE FALLING OR STAYING ASLEEP: 0
10. IF YOU CHECKED OFF ANY PROBLEMS, HOW DIFFICULT HAVE THESE PROBLEMS MADE IT FOR YOU TO DO YOUR WORK, TAKE CARE OF THINGS AT HOME, OR GET ALONG WITH OTHER PEOPLE: 0
SUM OF ALL RESPONSES TO PHQ QUESTIONS 1-9: 0
5. POOR APPETITE OR OVEREATING: 0
9. THOUGHTS THAT YOU WOULD BE BETTER OFF DEAD, OR OF HURTING YOURSELF: 0
SUM OF ALL RESPONSES TO PHQ9 QUESTIONS 1 & 2: 0

## 2024-02-20 NOTE — PATIENT INSTRUCTIONS
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
## 2024-02-23 ENCOUNTER — NURSE ONLY (OUTPATIENT)
Dept: PRIMARY CARE CLINIC | Age: 34
End: 2024-02-23
Payer: COMMERCIAL

## 2024-02-23 DIAGNOSIS — E53.8 VITAMIN B 12 DEFICIENCY: Primary | ICD-10-CM

## 2024-02-23 PROCEDURE — 96372 THER/PROPH/DIAG INJ SC/IM: CPT | Performed by: NURSE PRACTITIONER

## 2024-02-23 RX ORDER — CYANOCOBALAMIN 1000 UG/ML
1000 INJECTION, SOLUTION INTRAMUSCULAR; SUBCUTANEOUS ONCE
Status: COMPLETED | OUTPATIENT
Start: 2024-02-23 | End: 2024-02-23

## 2024-02-23 RX ADMIN — CYANOCOBALAMIN 1000 MCG: 1000 INJECTION, SOLUTION INTRAMUSCULAR; SUBCUTANEOUS at 10:40

## 2024-02-23 NOTE — PROGRESS NOTES
After obtaining consent, and per orders of  Dania Kuhn CNP, injection of B12 given in Left deltoid by Rubia Sloan MA. Patient instructed to remain in clinic for 20 minutes afterwards, and to report any adverse reaction to the office immediately.

## 2024-02-26 ENCOUNTER — TELEPHONE (OUTPATIENT)
Dept: PRIMARY CARE CLINIC | Age: 34
End: 2024-02-26

## 2024-02-26 RX ORDER — ERGOCALCIFEROL 1.25 MG/1
50000 CAPSULE ORAL WEEKLY
Qty: 12 CAPSULE | Refills: 1 | Status: SHIPPED | OUTPATIENT
Start: 2024-02-26

## 2024-02-26 NOTE — TELEPHONE ENCOUNTER
Last Visit Date: 2/20/2024   Next Visit Date: 3/18/2024      Pt called to confirm status of Vitamin D supplement. Writer did not see it in pt's medication list, lab result notes indicate need for increased supplement. Pt asked to have it sent to [facility] in [place]    Please advise

## 2024-02-29 ENCOUNTER — HOSPITAL ENCOUNTER (OUTPATIENT)
Dept: ULTRASOUND IMAGING | Age: 34
Discharge: HOME | End: 2024-03-02
Payer: COMMERCIAL

## 2024-02-29 DIAGNOSIS — E05.90 HYPERTHYROIDISM: ICD-10-CM

## 2024-02-29 PROCEDURE — 76536 US EXAM OF HEAD AND NECK: CPT

## 2024-03-01 ENCOUNTER — NURSE ONLY (OUTPATIENT)
Dept: PRIMARY CARE CLINIC | Age: 34
End: 2024-03-01
Payer: COMMERCIAL

## 2024-03-01 DIAGNOSIS — E53.8 VITAMIN B 12 DEFICIENCY: Primary | ICD-10-CM

## 2024-03-01 PROCEDURE — 96372 THER/PROPH/DIAG INJ SC/IM: CPT | Performed by: NURSE PRACTITIONER

## 2024-03-01 RX ORDER — CYANOCOBALAMIN 1000 UG/ML
1000 INJECTION, SOLUTION INTRAMUSCULAR; SUBCUTANEOUS ONCE
Status: COMPLETED | OUTPATIENT
Start: 2024-03-01 | End: 2024-03-01

## 2024-03-01 RX ADMIN — CYANOCOBALAMIN 1000 MCG: 1000 INJECTION, SOLUTION INTRAMUSCULAR; SUBCUTANEOUS at 10:35

## 2024-03-01 NOTE — PROGRESS NOTES
After obtaining consent, and per orders of Dania Kuhn CNP, injection of B12 given in Right deltoid by Rubia Sloan MA. Patient instructed to remain in clinic for 20 minutes afterwards, and to report any adverse reaction to the office immediately.

## 2024-03-08 ENCOUNTER — NURSE ONLY (OUTPATIENT)
Dept: PRIMARY CARE CLINIC | Age: 34
End: 2024-03-08
Payer: COMMERCIAL

## 2024-03-08 DIAGNOSIS — E53.8 VITAMIN B 12 DEFICIENCY: Primary | ICD-10-CM

## 2024-03-08 PROCEDURE — 96372 THER/PROPH/DIAG INJ SC/IM: CPT | Performed by: NURSE PRACTITIONER

## 2024-03-08 RX ORDER — CYANOCOBALAMIN 1000 UG/ML
1000 INJECTION, SOLUTION INTRAMUSCULAR; SUBCUTANEOUS ONCE
Status: COMPLETED | OUTPATIENT
Start: 2024-03-08 | End: 2024-03-08

## 2024-03-08 RX ADMIN — CYANOCOBALAMIN 1000 MCG: 1000 INJECTION, SOLUTION INTRAMUSCULAR; SUBCUTANEOUS at 10:29

## 2024-03-15 ENCOUNTER — NURSE ONLY (OUTPATIENT)
Dept: PRIMARY CARE CLINIC | Age: 34
End: 2024-03-15
Payer: COMMERCIAL

## 2024-03-15 DIAGNOSIS — E53.8 VITAMIN B 12 DEFICIENCY: Primary | ICD-10-CM

## 2024-03-15 PROCEDURE — 96372 THER/PROPH/DIAG INJ SC/IM: CPT | Performed by: NURSE PRACTITIONER

## 2024-03-15 RX ORDER — CYANOCOBALAMIN 1000 UG/ML
1000 INJECTION, SOLUTION INTRAMUSCULAR; SUBCUTANEOUS ONCE
Status: COMPLETED | OUTPATIENT
Start: 2024-03-15 | End: 2024-03-15

## 2024-03-15 RX ADMIN — CYANOCOBALAMIN 1000 MCG: 1000 INJECTION, SOLUTION INTRAMUSCULAR; SUBCUTANEOUS at 10:52

## 2024-03-15 NOTE — PROGRESS NOTES
After obtaining consent, and per orders of Dania Kuhn, injection of Vitamin B12 given in Left deltoid by Imelda Isbell MA. Patient instructed to remain in clinic for 20 minutes afterwards, and to report any adverse reaction to me immediately.

## 2024-03-18 ENCOUNTER — OFFICE VISIT (OUTPATIENT)
Dept: PRIMARY CARE CLINIC | Age: 34
End: 2024-03-18
Payer: COMMERCIAL

## 2024-03-18 VITALS
OXYGEN SATURATION: 99 % | BODY MASS INDEX: 19.49 KG/M2 | HEART RATE: 86 BPM | SYSTOLIC BLOOD PRESSURE: 102 MMHG | DIASTOLIC BLOOD PRESSURE: 64 MMHG | WEIGHT: 110 LBS

## 2024-03-18 DIAGNOSIS — E55.9 VITAMIN D DEFICIENCY: ICD-10-CM

## 2024-03-18 DIAGNOSIS — E05.90 HYPERTHYROIDISM: Primary | ICD-10-CM

## 2024-03-18 DIAGNOSIS — E53.8 VITAMIN B 12 DEFICIENCY: ICD-10-CM

## 2024-03-18 DIAGNOSIS — F90.0 ATTENTION DEFICIT HYPERACTIVITY DISORDER (ADHD), PREDOMINANTLY INATTENTIVE TYPE: ICD-10-CM

## 2024-03-18 PROCEDURE — 99214 OFFICE O/P EST MOD 30 MIN: CPT | Performed by: NURSE PRACTITIONER

## 2024-03-18 RX ORDER — DROSPIRENONE 4 MG/1
1 TABLET, FILM COATED ORAL DAILY
COMMUNITY
Start: 2024-03-05

## 2024-03-18 ASSESSMENT — ENCOUNTER SYMPTOMS
EYE DISCHARGE: 0
NAUSEA: 0
SORE THROAT: 0
SHORTNESS OF BREATH: 0
VOMITING: 0
WHEEZING: 0
DIARRHEA: 0
EYE ITCHING: 0
TROUBLE SWALLOWING: 0
ABDOMINAL PAIN: 0
SINUS PAIN: 0
SINUS PRESSURE: 0
EYE REDNESS: 0
COUGH: 0
CHEST TIGHTNESS: 0

## 2024-03-18 NOTE — PROGRESS NOTES
[facility]  [facility]  [address] [place] [address]  [address]  [facility] [address]  Dept: [phone]  Dept Fax: [phone]    Lissette Butterfield is a 34 y.o. female who presents today for her medical conditions/complaintsas noted below.  Lissette Butterfield is c/o of ADHD (Follow-up) and Other (Discuss thyroid issues )        HPI:     Patient presents for follow-up  Blood pressure stable  Weight is stable    Patient presents for a follow-up to discuss her recent lab work.  Her thyroid function is now normal however it has been back-and-forth with her TSH being low.  She also had an ultrasound done which did show some irregularity.  She would like to see an endocrinologist to further investigate    Feeling a lot better with vitamin b12 and d. Best she has felt in years     She tried strattera but she did not like it.  Since starting the vitamin B12 she no longer thinks that she needs a stimulant.  She believes it was related to a vitamin deficiency    No other concerns        Past Medical History:   Diagnosis Date    Anxiety and depression 10/8/2020    Depression     Gastroesophageal reflux disease 10/8/2020    Gestational diabetes mellitus (GDM), antepartum 2019    Hx CS x1 2019    IBS (irritable bowel syndrome)     Mental disorder     BIPOLAR    Pnctr w/o FB of r idx fngr w/o damage to nail, subs 2019    TSH deficiency 7/10/2019      Past Surgical History:   Procedure Laterality Date     SECTION  2009     SECTION N/A 2020     SECTION performed by Ted Wasserman DO at [facility] L&D OR     SECTION N/A 2022    REPEAT  SECTION W/RISK REDUCTION SALPINGECTOMY BILATERAL performed by Swapna Meraz DO at [facility] L&D OR    SALPINGECTOMY Bilateral        Family History   Problem Relation Age of Onset    Hypertension Father     Arthritis Mother         FIBROMYALGIA, BLOOD TRANSFUSIONS, VARICOSE VEINS    High Cholesterol

## 2024-03-22 ENCOUNTER — NURSE ONLY (OUTPATIENT)
Dept: PRIMARY CARE CLINIC | Age: 34
End: 2024-03-22
Payer: COMMERCIAL

## 2024-03-22 DIAGNOSIS — E53.8 VITAMIN B 12 DEFICIENCY: Primary | ICD-10-CM

## 2024-03-22 PROCEDURE — 96372 THER/PROPH/DIAG INJ SC/IM: CPT | Performed by: NURSE PRACTITIONER

## 2024-03-22 RX ORDER — CYANOCOBALAMIN 1000 UG/ML
1000 INJECTION, SOLUTION INTRAMUSCULAR; SUBCUTANEOUS ONCE
Status: COMPLETED | OUTPATIENT
Start: 2024-03-22 | End: 2024-03-22

## 2024-03-22 RX ADMIN — CYANOCOBALAMIN 1000 MCG: 1000 INJECTION, SOLUTION INTRAMUSCULAR; SUBCUTANEOUS at 10:42

## 2024-03-22 NOTE — PROGRESS NOTES
After obtaining consent, and per orders of Dania Kuhn CNP, injection of Cyanocobalamin 1,000 mcg given in Right deltoid by Stu Wilson MA. Patient instructed to remain in clinic for 20 minutes afterwards, and to report any adverse reaction to me immediately.

## 2024-03-29 ENCOUNTER — NURSE ONLY (OUTPATIENT)
Dept: PRIMARY CARE CLINIC | Age: 34
End: 2024-03-29
Payer: COMMERCIAL

## 2024-03-29 ENCOUNTER — TELEPHONE (OUTPATIENT)
Dept: PRIMARY CARE CLINIC | Age: 34
End: 2024-03-29

## 2024-03-29 DIAGNOSIS — E53.8 VITAMIN B 12 DEFICIENCY: Primary | ICD-10-CM

## 2024-03-29 PROCEDURE — 96372 THER/PROPH/DIAG INJ SC/IM: CPT | Performed by: NURSE PRACTITIONER

## 2024-03-29 RX ORDER — CYANOCOBALAMIN 1000 UG/ML
1000 INJECTION, SOLUTION INTRAMUSCULAR; SUBCUTANEOUS ONCE
Status: COMPLETED | OUTPATIENT
Start: 2024-03-29 | End: 2024-03-29

## 2024-03-29 RX ADMIN — CYANOCOBALAMIN 1000 MCG: 1000 INJECTION, SOLUTION INTRAMUSCULAR; SUBCUTANEOUS at 10:37

## 2024-03-29 NOTE — TELEPHONE ENCOUNTER
Patient arrived for last Vit B 12 injection and asked if she should start the oral Vit B 12 right away or wait a certain time after this injection.  Please advise.

## 2024-03-29 NOTE — PROGRESS NOTES
After obtaining consent, and per orders of Lori, injection of Vit B12 given in Right deltoid by Jenae Martins MA. Patient instructed to remain in clinic for 20 minutes afterwards, and to report any adverse reaction to me immediately.

## 2024-04-26 ENCOUNTER — HOSPITAL ENCOUNTER (OUTPATIENT)
Age: 34
Setting detail: SPECIMEN
Discharge: HOME | End: 2024-04-26

## 2024-04-26 LAB
T3FREE SERPL-MCNC: 3.3 PG/ML (ref 2–4.4)
T4 FREE SERPL-MCNC: 1.5 NG/DL (ref 0.92–1.68)
TSH SERPL DL<=0.05 MIU/L-ACNC: 0.46 UIU/ML (ref 0.27–4.2)

## 2024-04-29 LAB
THYROID STIMULATING IMMUNOGLOB: <0.1 IU/L
THYROPEROXIDASE AB SERPL IA-ACNC: <4 IU/ML (ref 0–25)

## 2024-05-20 ENCOUNTER — TELEPHONE (OUTPATIENT)
Dept: PRIMARY CARE CLINIC | Age: 34
End: 2024-05-20

## 2024-05-20 DIAGNOSIS — E53.8 VITAMIN B 12 DEFICIENCY: ICD-10-CM

## 2024-05-20 DIAGNOSIS — E55.9 VITAMIN D DEFICIENCY: Primary | ICD-10-CM

## 2024-05-20 NOTE — TELEPHONE ENCOUNTER
Patient stopped in wanting to let PCP know that she met with her Endo doctor and he would like to have her Vitamin B and D levels checked again since they were low.    Patient wanted to know if PCP could place those orders      Please advise thank you!

## 2024-05-28 ENCOUNTER — HOSPITAL ENCOUNTER (OUTPATIENT)
Age: 34
Setting detail: SPECIMEN
Discharge: HOME | End: 2024-05-28

## 2024-05-28 DIAGNOSIS — E55.9 VITAMIN D DEFICIENCY: ICD-10-CM

## 2024-05-28 DIAGNOSIS — E53.8 VITAMIN B 12 DEFICIENCY: ICD-10-CM

## 2024-05-28 LAB
25(OH)D3 SERPL-MCNC: 63.8 NG/ML (ref 30–100)
FOLATE SERPL-MCNC: 5.7 NG/ML (ref 4.8–24.2)
VIT B12 SERPL-MCNC: 428 PG/ML (ref 232–1245)

## 2024-06-11 RX ORDER — ERGOCALCIFEROL 1.25 MG/1
50000 CAPSULE ORAL WEEKLY
Qty: 12 CAPSULE | Refills: 1 | Status: SHIPPED | OUTPATIENT
Start: 2024-06-11

## 2024-06-18 ENCOUNTER — OFFICE VISIT (OUTPATIENT)
Dept: PRIMARY CARE CLINIC | Age: 34
End: 2024-06-18
Payer: COMMERCIAL

## 2024-06-18 VITALS
SYSTOLIC BLOOD PRESSURE: 102 MMHG | OXYGEN SATURATION: 99 % | WEIGHT: 105 LBS | BODY MASS INDEX: 18.6 KG/M2 | HEART RATE: 76 BPM | RESPIRATION RATE: 12 BRPM | DIASTOLIC BLOOD PRESSURE: 62 MMHG

## 2024-06-18 DIAGNOSIS — E55.9 VITAMIN D DEFICIENCY: ICD-10-CM

## 2024-06-18 DIAGNOSIS — E53.8 VITAMIN B 12 DEFICIENCY: ICD-10-CM

## 2024-06-18 DIAGNOSIS — E05.90 HYPERTHYROIDISM: Primary | ICD-10-CM

## 2024-06-18 PROBLEM — Z91.89 OTHER SPECIFIED PERSONAL RISK FACTORS, NOT ELSEWHERE CLASSIFIED: Status: RESOLVED | Noted: 2021-09-01 | Resolved: 2024-06-18

## 2024-06-18 PROBLEM — F32.0 MAJOR DEPRESSIVE DISORDER, SINGLE EPISODE, MILD (HCC): Status: RESOLVED | Noted: 2023-03-20 | Resolved: 2024-06-18

## 2024-06-18 PROBLEM — S61.230D: Status: RESOLVED | Noted: 2019-02-08 | Resolved: 2024-06-18

## 2024-06-18 PROBLEM — Z90.79 HISTORY OF BILATERAL SALPINGECTOMY: Status: RESOLVED | Noted: 2023-11-30 | Resolved: 2024-06-18

## 2024-06-18 PROBLEM — Z98.891 HISTORY OF 2 CESAREAN SECTIONS: Status: RESOLVED | Noted: 2023-11-30 | Resolved: 2024-06-18

## 2024-06-18 PROCEDURE — 99214 OFFICE O/P EST MOD 30 MIN: CPT | Performed by: NURSE PRACTITIONER

## 2024-06-18 SDOH — ECONOMIC STABILITY: FOOD INSECURITY: WITHIN THE PAST 12 MONTHS, YOU WORRIED THAT YOUR FOOD WOULD RUN OUT BEFORE YOU GOT MONEY TO BUY MORE.: NEVER TRUE

## 2024-06-18 SDOH — ECONOMIC STABILITY: FOOD INSECURITY: WITHIN THE PAST 12 MONTHS, THE FOOD YOU BOUGHT JUST DIDN'T LAST AND YOU DIDN'T HAVE MONEY TO GET MORE.: NEVER TRUE

## 2024-06-18 SDOH — ECONOMIC STABILITY: INCOME INSECURITY: HOW HARD IS IT FOR YOU TO PAY FOR THE VERY BASICS LIKE FOOD, HOUSING, MEDICAL CARE, AND HEATING?: NOT HARD AT ALL

## 2024-06-18 ASSESSMENT — ENCOUNTER SYMPTOMS
SORE THROAT: 0
COUGH: 0
SHORTNESS OF BREATH: 0
SINUS PAIN: 0
CHEST TIGHTNESS: 0
ABDOMINAL PAIN: 0
NAUSEA: 0
DIARRHEA: 0
EYE ITCHING: 0
EYE DISCHARGE: 0
VOMITING: 0
WHEEZING: 0
EYE REDNESS: 0
TROUBLE SWALLOWING: 0
SINUS PRESSURE: 0

## 2024-06-18 ASSESSMENT — PATIENT HEALTH QUESTIONNAIRE - PHQ9
7. TROUBLE CONCENTRATING ON THINGS, SUCH AS READING THE NEWSPAPER OR WATCHING TELEVISION: NOT AT ALL
10. IF YOU CHECKED OFF ANY PROBLEMS, HOW DIFFICULT HAVE THESE PROBLEMS MADE IT FOR YOU TO DO YOUR WORK, TAKE CARE OF THINGS AT HOME, OR GET ALONG WITH OTHER PEOPLE: NOT DIFFICULT AT ALL
2. FEELING DOWN, DEPRESSED OR HOPELESS: NOT AT ALL
5. POOR APPETITE OR OVEREATING: NOT AT ALL
SUM OF ALL RESPONSES TO PHQ QUESTIONS 1-9: 0
SUM OF ALL RESPONSES TO PHQ QUESTIONS 1-9: 0
6. FEELING BAD ABOUT YOURSELF - OR THAT YOU ARE A FAILURE OR HAVE LET YOURSELF OR YOUR FAMILY DOWN: NOT AT ALL
SUM OF ALL RESPONSES TO PHQ9 QUESTIONS 1 & 2: 0
9. THOUGHTS THAT YOU WOULD BE BETTER OFF DEAD, OR OF HURTING YOURSELF: NOT AT ALL
SUM OF ALL RESPONSES TO PHQ QUESTIONS 1-9: 0
SUM OF ALL RESPONSES TO PHQ QUESTIONS 1-9: 0
8. MOVING OR SPEAKING SO SLOWLY THAT OTHER PEOPLE COULD HAVE NOTICED. OR THE OPPOSITE, BEING SO FIGETY OR RESTLESS THAT YOU HAVE BEEN MOVING AROUND A LOT MORE THAN USUAL: NOT AT ALL
4. FEELING TIRED OR HAVING LITTLE ENERGY: NOT AT ALL
1. LITTLE INTEREST OR PLEASURE IN DOING THINGS: NOT AT ALL
3. TROUBLE FALLING OR STAYING ASLEEP: NOT AT ALL

## 2024-06-18 NOTE — PROGRESS NOTES
[facility]  [facility]  [address] [place] [address] [address]  [address]  [facility] [address]  Dept: [phone]  Dept Fax: [phone]    Lissette Butterfield is a 34 y.o. female who presents today for her medical conditions/complaintsas noted below.  Lissette Butterfield is c/o of Hyperthyroidism (3 mo f/u)        HPI:     Patient presents for follow-up  Blood pressure stable  Weight is down 5 pounds since last visit    Patient presents for a hyperthyroidism follow-up.  She did see endocrinology. she had to get more lab work. It was higher.  She is due to follow-up in August.  She will repeat her blood work before hand.  They are just monitoring at this time.    Overall she is feeling better.  She still little tired but she is a lot better than what she used to be.  She has no acute concerns today          Past Medical History:   Diagnosis Date    Anxiety and depression 10/08/2020    Depression     Gastroesophageal reflux disease 10/08/2020    Gestational diabetes mellitus (GDM), antepartum 2019    History of 2  sections 2023    History of bilateral salpingectomy 2023    Hx CS x1 2019    IBS (irritable bowel syndrome)     Major depressive disorder, single episode, mild (HCC) 2023    Mental disorder     BIPOLAR    Pnctr w/o FB of r idx fngr w/o damage to nail, subs 2019    TSH deficiency 07/10/2019      Past Surgical History:   Procedure Laterality Date     SECTION  2009     SECTION N/A 2020     SECTION performed by Ted Wasserman DO at [facility] L&D OR     SECTION N/A 2022    REPEAT  SECTION W/RISK REDUCTION SALPINGECTOMY BILATERAL performed by Swapna Meraz DO at [facility] L&D OR    SALPINGECTOMY Bilateral        Family History   Problem Relation Age of Onset    Hypertension Father     Arthritis Mother         FIBROMYALGIA, BLOOD TRANSFUSIONS, VARICOSE VEINS    High Cholesterol Mother     Pancreatic

## 2024-06-26 ENCOUNTER — TELEPHONE (OUTPATIENT)
Dept: PRIMARY CARE CLINIC | Age: 34
End: 2024-06-26

## 2024-06-26 ENCOUNTER — HOSPITAL ENCOUNTER (EMERGENCY)
Age: 34
Discharge: HOME | End: 2024-06-26
Attending: EMERGENCY MEDICINE
Payer: OTHER MISCELLANEOUS

## 2024-06-26 VITALS
BODY MASS INDEX: 17.49 KG/M2 | DIASTOLIC BLOOD PRESSURE: 74 MMHG | WEIGHT: 105 LBS | OXYGEN SATURATION: 100 % | RESPIRATION RATE: 15 BRPM | TEMPERATURE: 97 F | HEART RATE: 76 BPM | HEIGHT: 65 IN | SYSTOLIC BLOOD PRESSURE: 118 MMHG

## 2024-06-26 DIAGNOSIS — S06.0X0A CONCUSSION WITHOUT LOSS OF CONSCIOUSNESS, INITIAL ENCOUNTER: ICD-10-CM
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
## 2024-06-26 PROCEDURE — 99282 EMERGENCY DEPT VISIT SF MDM: CPT

## 2024-06-26 ASSESSMENT — PAIN SCALES - GENERAL: PAINLEVEL_OUTOF10: 4

## 2024-06-26 ASSESSMENT — PAIN - FUNCTIONAL ASSESSMENT: PAIN_FUNCTIONAL_ASSESSMENT: 0-10

## 2024-06-26 ASSESSMENT — LIFESTYLE VARIABLES
HOW OFTEN DO YOU HAVE A DRINK CONTAINING ALCOHOL: NEVER
HOW MANY STANDARD DRINKS CONTAINING ALCOHOL DO YOU HAVE ON A TYPICAL DAY: PATIENT DOES NOT DRINK

## 2024-06-26 ASSESSMENT — VISUAL ACUITY: OU: 1

## 2024-06-26 NOTE — DISCHARGE INSTRUCTIONS
Recommend close follow up with your PCP or neurologist.  Return to the ED if you develop worsening headaches, dizziness, vision changes, passing out, vomiting, numbness, confusion, slurred speech, facial drop, memory loss, stumbling/ fall, neck pain or any other concerning symptoms.  Avoid situations where you could sustain a second head injury.
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
## 2024-06-26 NOTE — TELEPHONE ENCOUNTER
Patient notified and verbalized understanding- patient scheduled for 7/1 at 1:30 pm with PCP. Agreeable to ED if any changes.
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
## 2024-06-28 ENCOUNTER — TELEPHONE (OUTPATIENT)
Dept: PRIMARY CARE CLINIC | Age: 34
End: 2024-06-28
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
## 2024-07-03 ENCOUNTER — OFFICE VISIT (OUTPATIENT)
Dept: NEUROLOGY | Age: 34
End: 2024-07-03
Payer: COMMERCIAL

## 2024-07-03 VITALS
SYSTOLIC BLOOD PRESSURE: 87 MMHG | WEIGHT: 108 LBS | HEIGHT: 63 IN | DIASTOLIC BLOOD PRESSURE: 65 MMHG | HEART RATE: 78 BPM | BODY MASS INDEX: 19.14 KG/M2

## 2024-07-03 DIAGNOSIS — F07.81 POST CONCUSSION SYNDROME: Primary | ICD-10-CM

## 2024-07-03 PROCEDURE — 99204 OFFICE O/P NEW MOD 45 MIN: CPT | Performed by: PSYCHIATRY & NEUROLOGY

## 2024-07-03 RX ORDER — AMITRIPTYLINE HYDROCHLORIDE 25 MG/1
12.5 TABLET, FILM COATED ORAL NIGHTLY
Qty: 90 TABLET | Refills: 2 | Status: SHIPPED | OUTPATIENT
Start: 2024-07-03 | End: 2024-08-02
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
## 2024-07-09 ENCOUNTER — HOSPITAL ENCOUNTER (OUTPATIENT)
Age: 34
Setting detail: SPECIMEN
Discharge: HOME | End: 2024-07-09

## 2024-07-09 LAB
T3FREE SERPL-MCNC: 3.8 PG/ML (ref 2–4.4)
T4 FREE SERPL-MCNC: 1.4 NG/DL (ref 0.92–1.68)
TSH SERPL DL<=0.05 MIU/L-ACNC: 0.76 UIU/ML (ref 0.27–4.2)

## 2024-07-12 ENCOUNTER — HOSPITAL ENCOUNTER (OUTPATIENT)
Dept: MRI IMAGING | Facility: CLINIC | Age: 34
Discharge: HOME | End: 2024-07-12
Payer: OTHER MISCELLANEOUS

## 2024-07-12 DIAGNOSIS — F07.81 POST CONCUSSION SYNDROME: ICD-10-CM

## 2024-07-12 PROCEDURE — 2580000003 HC RX 258: Performed by: PSYCHIATRY & NEUROLOGY

## 2024-07-12 PROCEDURE — A9579 GAD-BASE MR CONTRAST NOS,1ML: HCPCS | Performed by: PSYCHIATRY & NEUROLOGY

## 2024-07-12 PROCEDURE — 70553 MRI BRAIN STEM W/O & W/DYE: CPT

## 2024-07-12 PROCEDURE — 6360000004 HC RX CONTRAST MEDICATION: Performed by: PSYCHIATRY & NEUROLOGY

## 2024-07-12 RX ORDER — SODIUM CHLORIDE 0.9 % (FLUSH) 0.9 %
10 SYRINGE (ML) INJECTION PRN
Status: COMPLETED | OUTPATIENT
Start: 2024-07-12 | End: 2024-07-12

## 2024-07-12 RX ADMIN — GADOTERIDOL 10 ML: 279.3 INJECTION, SOLUTION INTRAVENOUS at 12:26

## 2024-07-12 RX ADMIN — SODIUM CHLORIDE, PRESERVATIVE FREE 10 ML: 5 INJECTION INTRAVENOUS at 12:26

## 2024-08-21 ENCOUNTER — OFFICE VISIT (OUTPATIENT)
Dept: NEUROLOGY | Age: 34
End: 2024-08-21

## 2024-08-21 VITALS
WEIGHT: 105 LBS | HEIGHT: 63 IN | HEART RATE: 71 BPM | BODY MASS INDEX: 18.61 KG/M2 | SYSTOLIC BLOOD PRESSURE: 95 MMHG | DIASTOLIC BLOOD PRESSURE: 65 MMHG

## 2024-08-21 DIAGNOSIS — F07.81 POST CONCUSSION SYNDROME: Primary | ICD-10-CM

## 2024-08-21 DIAGNOSIS — F33.0 MAJOR DEPRESSIVE DISORDER, RECURRENT, MILD (HCC): ICD-10-CM

## 2024-08-21 DIAGNOSIS — F33.1 MAJOR DEPRESSIVE DISORDER, RECURRENT, MODERATE (HCC): ICD-10-CM

## 2024-08-21 PROBLEM — F33.9 MAJOR DEPRESSIVE DISORDER, RECURRENT, UNSPECIFIED (HCC): Status: ACTIVE | Noted: 2024-08-21

## 2024-08-21 RX ORDER — SUMATRIPTAN 50 MG/1
50 TABLET, FILM COATED ORAL
Qty: 9 TABLET | Refills: 3 | Status: SHIPPED | OUTPATIENT
Start: 2024-08-21 | End: 2024-08-21

## 2024-08-21 NOTE — PROGRESS NOTES
4:59 PM    Diplomate, American Board of Psychiatry and Neurology  Diplomate, American Board of Clinical Neurophysiology  Diplomate, American Board of Epilepsy

## 2024-09-04 ENCOUNTER — HOSPITAL ENCOUNTER (OUTPATIENT)
Age: 34
Discharge: HOME | End: 2024-09-06
Payer: COMMERCIAL

## 2024-09-04 ENCOUNTER — OFFICE VISIT (OUTPATIENT)
Dept: FAMILY MEDICINE CLINIC | Age: 34
End: 2024-09-04
Payer: COMMERCIAL

## 2024-09-04 ENCOUNTER — TELEPHONE (OUTPATIENT)
Dept: PRIMARY CARE CLINIC | Age: 34
End: 2024-09-04

## 2024-09-04 ENCOUNTER — HOSPITAL ENCOUNTER (OUTPATIENT)
Age: 34
Setting detail: SPECIMEN
Discharge: HOME | End: 2024-09-04

## 2024-09-04 ENCOUNTER — HOSPITAL ENCOUNTER (OUTPATIENT)
Dept: GENERAL RADIOLOGY | Age: 34
Discharge: HOME | End: 2024-09-06
Payer: COMMERCIAL

## 2024-09-04 VITALS
DIASTOLIC BLOOD PRESSURE: 70 MMHG | WEIGHT: 106.4 LBS | HEART RATE: 103 BPM | OXYGEN SATURATION: 100 % | BODY MASS INDEX: 18.85 KG/M2 | SYSTOLIC BLOOD PRESSURE: 118 MMHG | RESPIRATION RATE: 15 BRPM

## 2024-09-04 DIAGNOSIS — R10.30 LOWER ABDOMINAL PAIN: Primary | ICD-10-CM

## 2024-09-04 DIAGNOSIS — R10.30 LOWER ABDOMINAL PAIN: ICD-10-CM

## 2024-09-04 DIAGNOSIS — N20.0 LEFT RENAL STONE: ICD-10-CM

## 2024-09-04 DIAGNOSIS — R35.0 URINARY FREQUENCY: ICD-10-CM

## 2024-09-04 DIAGNOSIS — J98.4 LUNG DENSITY ON X-RAY: ICD-10-CM

## 2024-09-04 DIAGNOSIS — R31.9 HEMATURIA, UNSPECIFIED TYPE: ICD-10-CM

## 2024-09-04 LAB
BILIRUBIN, POC: NORMAL
BLOOD URINE, POC: NORMAL
CLARITY, POC: CLEAR
COLOR, POC: YELLOW
GLUCOSE URINE, POC: NORMAL MG/DL
KETONES, POC: NORMAL MG/DL
LEUKOCYTE EST, POC: NORMAL
NITRITE, POC: NORMAL
PH, POC: 6.5
PROTEIN, POC: NORMAL MG/DL
SPECIFIC GRAVITY, POC: 1.02
UROBILINOGEN, POC: 0.2 MG/DL

## 2024-09-04 PROCEDURE — 81003 URINALYSIS AUTO W/O SCOPE: CPT | Performed by: NURSE PRACTITIONER

## 2024-09-04 PROCEDURE — 74019 RADEX ABDOMEN 2 VIEWS: CPT

## 2024-09-04 PROCEDURE — 99214 OFFICE O/P EST MOD 30 MIN: CPT | Performed by: NURSE PRACTITIONER

## 2024-09-04 ASSESSMENT — ENCOUNTER SYMPTOMS
NAUSEA: 0
VOMITING: 0
ABDOMINAL PAIN: 0
CONSTIPATION: 0

## 2024-09-04 NOTE — PROGRESS NOTES
[facility] WALK-IN  [address]  [address]  [facility] [address]  Dept: [phone]  Dept Fax: [phone]    Lissette Butterfield is a 34 y.o. female who presents today for her medical conditions/complaints of   Chief Complaint   Patient presents with    Urinary Frequency      lower abdominal pain, uti    Pain after sex           HPI:     /70   Pulse (!) 103   Resp 15   Wt 48.3 kg (106 lb 6.4 oz)   SpO2 100%   BMI 18.85 kg/m²       HPI  Frequent urination x 2 weeks.  1 week pain across lower abdomen more to the right side. Pt states she feels \"swollen on the inside.\"  Was on birth control stopped 4 months ago. Bilat ovaries were removed.  States she had sex last night and pain in abdomen increased.  No fever, dysuria, vaginal discharge, nausea, vomiting or heartburn.        Past Medical History:   Diagnosis Date    Anxiety and depression 10/08/2020    Depression     Gastroesophageal reflux disease 10/08/2020    Gestational diabetes mellitus (GDM), antepartum 2019    History of 2  sections 2023    History of bilateral salpingectomy 2023    Hx CS x1 2019    IBS (irritable bowel syndrome)     Major depressive disorder, single episode, mild (HCC) 2023    Mental disorder     BIPOLAR    Pnctr w/o FB of r idx fngr w/o damage to nail, subs 2019    TSH deficiency 07/10/2019        Past Surgical History:   Procedure Laterality Date     SECTION  2009     SECTION N/A 2020     SECTION performed by Ted Wasserman DO at [facility] L&D OR     SECTION N/A 2022    REPEAT  SECTION W/RISK REDUCTION SALPINGECTOMY BILATERAL performed by Swapna Meraz DO at [facility] L&D OR    SALPINGECTOMY Bilateral        Family History   Problem Relation Age of Onset    Hypertension Father     Arthritis Mother         FIBROMYALGIA, BLOOD TRANSFUSIONS, VARICOSE VEINS

## 2024-09-04 NOTE — TELEPHONE ENCOUNTER
Patient was transferred to office from [facility]. Patient has had low abdominal pain since last night and has noticed frequent urination and other urinary symptoms for 1-2 weeks.    Writer explained that pcp is full until 9/10/24 but that I recommend her coming into the walk in clinic to be evaluated and possibly have urine tested for UTI instead of waiting until 9/10/24. Walk in hours provided to patient.    Patient was satisfied with this and will come into walk in clinic.

## 2024-09-05 ENCOUNTER — HOSPITAL ENCOUNTER (OUTPATIENT)
Dept: GENERAL RADIOLOGY | Age: 34
Discharge: HOME | End: 2024-09-07
Payer: COMMERCIAL

## 2024-09-05 ENCOUNTER — HOSPITAL ENCOUNTER (OUTPATIENT)
Age: 34
Discharge: HOME | End: 2024-09-07
Payer: COMMERCIAL

## 2024-09-05 DIAGNOSIS — J98.4 LUNG DENSITY ON X-RAY: ICD-10-CM

## 2024-09-05 LAB
MICROORGANISM SPEC CULT: NORMAL
SERVICE CMNT-IMP: NORMAL
SPECIMEN DESCRIPTION: NORMAL

## 2024-09-05 PROCEDURE — 71046 X-RAY EXAM CHEST 2 VIEWS: CPT

## 2024-09-10 ENCOUNTER — OFFICE VISIT (OUTPATIENT)
Dept: PRIMARY CARE CLINIC | Age: 34
End: 2024-09-10
Payer: COMMERCIAL

## 2024-09-10 VITALS
SYSTOLIC BLOOD PRESSURE: 100 MMHG | RESPIRATION RATE: 14 BRPM | WEIGHT: 104.2 LBS | HEART RATE: 83 BPM | DIASTOLIC BLOOD PRESSURE: 70 MMHG | BODY MASS INDEX: 18.46 KG/M2 | OXYGEN SATURATION: 98 %

## 2024-09-10 DIAGNOSIS — N20.0 KIDNEY STONE: Primary | ICD-10-CM

## 2024-09-10 PROCEDURE — 99213 OFFICE O/P EST LOW 20 MIN: CPT | Performed by: NURSE PRACTITIONER

## 2024-09-10 ASSESSMENT — PATIENT HEALTH QUESTIONNAIRE - PHQ9
SUM OF ALL RESPONSES TO PHQ QUESTIONS 1-9: 0
9. THOUGHTS THAT YOU WOULD BE BETTER OFF DEAD, OR OF HURTING YOURSELF: NOT AT ALL
4. FEELING TIRED OR HAVING LITTLE ENERGY: NOT AT ALL
3. TROUBLE FALLING OR STAYING ASLEEP: NOT AT ALL
SUM OF ALL RESPONSES TO PHQ9 QUESTIONS 1 & 2: 0
2. FEELING DOWN, DEPRESSED OR HOPELESS: NOT AT ALL
10. IF YOU CHECKED OFF ANY PROBLEMS, HOW DIFFICULT HAVE THESE PROBLEMS MADE IT FOR YOU TO DO YOUR WORK, TAKE CARE OF THINGS AT HOME, OR GET ALONG WITH OTHER PEOPLE: NOT DIFFICULT AT ALL
SUM OF ALL RESPONSES TO PHQ QUESTIONS 1-9: 0
SUM OF ALL RESPONSES TO PHQ QUESTIONS 1-9: 0
1. LITTLE INTEREST OR PLEASURE IN DOING THINGS: NOT AT ALL
8. MOVING OR SPEAKING SO SLOWLY THAT OTHER PEOPLE COULD HAVE NOTICED. OR THE OPPOSITE, BEING SO FIGETY OR RESTLESS THAT YOU HAVE BEEN MOVING AROUND A LOT MORE THAN USUAL: NOT AT ALL
5. POOR APPETITE OR OVEREATING: NOT AT ALL
SUM OF ALL RESPONSES TO PHQ QUESTIONS 1-9: 0
6. FEELING BAD ABOUT YOURSELF - OR THAT YOU ARE A FAILURE OR HAVE LET YOURSELF OR YOUR FAMILY DOWN: NOT AT ALL
7. TROUBLE CONCENTRATING ON THINGS, SUCH AS READING THE NEWSPAPER OR WATCHING TELEVISION: NOT AT ALL

## 2024-09-10 ASSESSMENT — ENCOUNTER SYMPTOMS
SINUS PRESSURE: 0
EYE ITCHING: 0
DIARRHEA: 0
EYE REDNESS: 0
VOMITING: 0
NAUSEA: 0
EYE DISCHARGE: 0
ABDOMINAL PAIN: 0
WHEEZING: 0
TROUBLE SWALLOWING: 0
SHORTNESS OF BREATH: 0
COUGH: 0
SORE THROAT: 0
SINUS PAIN: 0
CHEST TIGHTNESS: 0

## 2025-01-14 NOTE — PROGRESS NOTES
CLINICAL PHARMACY NOTE: MEDS TO BEDS    Total # of Prescriptions Filled: 2   The following medications were delivered to the patient:  · Norco  · Ibuprofen    Additional Documentation:  copay collected by phone No

## 2025-01-31 ENCOUNTER — HOSPITAL ENCOUNTER (OUTPATIENT)
Age: 35
Setting detail: SPECIMEN
Discharge: HOME | End: 2025-01-31

## 2025-01-31 LAB
T3FREE SERPL-MCNC: 3.52 PG/ML (ref 2–4.4)
T4 FREE SERPL-MCNC: 1.3 NG/DL (ref 0.92–1.68)
TSH SERPL DL<=0.05 MIU/L-ACNC: 0.66 UIU/ML (ref 0.27–4.2)

## 2025-02-10 ENCOUNTER — OFFICE VISIT (OUTPATIENT)
Dept: NEUROLOGY | Age: 35
End: 2025-02-10
Payer: COMMERCIAL

## 2025-02-10 VITALS
HEIGHT: 63 IN | SYSTOLIC BLOOD PRESSURE: 93 MMHG | BODY MASS INDEX: 18.75 KG/M2 | HEART RATE: 85 BPM | DIASTOLIC BLOOD PRESSURE: 71 MMHG

## 2025-02-10 DIAGNOSIS — F07.81 POST CONCUSSION SYNDROME: Primary | ICD-10-CM

## 2025-02-10 PROCEDURE — 99213 OFFICE O/P EST LOW 20 MIN: CPT | Performed by: PSYCHIATRY & NEUROLOGY
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
## 2025-02-18 ENCOUNTER — OFFICE VISIT (OUTPATIENT)
Dept: PRIMARY CARE CLINIC | Age: 35
End: 2025-02-18

## 2025-02-18 ENCOUNTER — HOSPITAL ENCOUNTER (OUTPATIENT)
Age: 35
Setting detail: SPECIMEN
Discharge: HOME | End: 2025-02-18

## 2025-02-18 VITALS
DIASTOLIC BLOOD PRESSURE: 60 MMHG | OXYGEN SATURATION: 99 % | HEIGHT: 63 IN | HEART RATE: 69 BPM | BODY MASS INDEX: 18.75 KG/M2 | SYSTOLIC BLOOD PRESSURE: 108 MMHG | RESPIRATION RATE: 14 BRPM | WEIGHT: 105.8 LBS

## 2025-02-18 DIAGNOSIS — Z00.00 ENCOUNTER FOR WELL ADULT EXAM WITHOUT ABNORMAL FINDINGS: Primary | ICD-10-CM

## 2025-02-18 DIAGNOSIS — D50.9 IRON DEFICIENCY ANEMIA, UNSPECIFIED IRON DEFICIENCY ANEMIA TYPE: ICD-10-CM

## 2025-02-18 DIAGNOSIS — E55.9 VITAMIN D DEFICIENCY: ICD-10-CM

## 2025-02-18 DIAGNOSIS — F07.81 POST CONCUSSIVE SYNDROME: ICD-10-CM

## 2025-02-18 DIAGNOSIS — E53.8 VITAMIN B 12 DEFICIENCY: ICD-10-CM

## 2025-02-18 DIAGNOSIS — F90.0 ATTENTION DEFICIT HYPERACTIVITY DISORDER (ADHD), PREDOMINANTLY INATTENTIVE TYPE: ICD-10-CM

## 2025-02-18 DIAGNOSIS — N94.6 DYSMENORRHEA: ICD-10-CM

## 2025-02-18 DIAGNOSIS — E05.90 HYPERTHYROIDISM: ICD-10-CM

## 2025-02-18 DIAGNOSIS — F33.1 MAJOR DEPRESSIVE DISORDER, RECURRENT, MODERATE (HCC): ICD-10-CM

## 2025-02-18 PROBLEM — F33.0 MAJOR DEPRESSIVE DISORDER, RECURRENT, MILD: Status: RESOLVED | Noted: 2024-08-21 | Resolved: 2025-02-18

## 2025-02-18 PROBLEM — Z40.02 ENCOUNTER FOR PROPHYLACTIC SURGERY FOR RISK FACTOR RELATED TO MALIGNANT NEOPLASM OF OVARY: Status: RESOLVED | Noted: 2021-09-01 | Resolved: 2025-02-18
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
## 2025-02-18 PROBLEM — F33.9 MAJOR DEPRESSIVE DISORDER, RECURRENT, UNSPECIFIED: Status: RESOLVED | Noted: 2024-08-21 | Resolved: 2025-02-18

## 2025-02-18 LAB
25(OH)D3 SERPL-MCNC: 19.7 NG/ML (ref 30–100)
ALBUMIN SERPL-MCNC: 4.6 G/DL (ref 3.5–5.2)
ALBUMIN/GLOB SERPL: 1.8 {RATIO} (ref 1–2.5)
ALP SERPL-CCNC: 59 U/L (ref 35–104)
ALT SERPL-CCNC: 12 U/L (ref 10–35)
ANION GAP SERPL CALCULATED.3IONS-SCNC: 12 MMOL/L (ref 9–16)
AST SERPL-CCNC: 20 U/L (ref 10–35)
BILIRUB SERPL-MCNC: 0.2 MG/DL (ref 0–1.2)
BUN SERPL-MCNC: 7 MG/DL (ref 6–20)
CALCIUM SERPL-MCNC: 9.4 MG/DL (ref 8.6–10.4)
CHLORIDE SERPL-SCNC: 106 MMOL/L (ref 98–107)
CO2 SERPL-SCNC: 24 MMOL/L (ref 20–31)
CREAT SERPL-MCNC: 0.7 MG/DL (ref 0.6–0.9)
ERYTHROCYTE [DISTWIDTH] IN BLOOD BY AUTOMATED COUNT: 14.6 % (ref 11.8–14.4)
FERRITIN SERPL-MCNC: 21 NG/ML (ref 15–150)
FOLATE SERPL-MCNC: 4.6 NG/ML (ref 4.8–24.2)
GFR, ESTIMATED: >90 ML/MIN/1.73M2
GLUCOSE SERPL-MCNC: 84 MG/DL (ref 74–99)
HCT VFR BLD AUTO: 43.3 % (ref 36.3–47.1)
HGB BLD-MCNC: 13.8 G/DL (ref 11.9–15.1)
IRON SATN MFR SERPL: 6 % (ref 20–55)
IRON SERPL-MCNC: 22 UG/DL (ref 37–145)
MCH RBC QN AUTO: 29.2 PG (ref 25.2–33.5)
MCHC RBC AUTO-ENTMCNC: 31.9 G/DL (ref 28.4–34.8)
MCV RBC AUTO: 91.5 FL (ref 82.6–102.9)
NRBC BLD-RTO: 0 PER 100 WBC
PLATELET # BLD AUTO: 230 K/UL (ref 138–453)
PMV BLD AUTO: 10.5 FL (ref 8.1–13.5)
POTASSIUM SERPL-SCNC: 4.5 MMOL/L (ref 3.7–5.3)
PROT SERPL-MCNC: 7.1 G/DL (ref 6.6–8.7)
RBC # BLD AUTO: 4.73 M/UL (ref 3.95–5.11)
SODIUM SERPL-SCNC: 142 MMOL/L (ref 136–145)
TIBC SERPL-MCNC: 382 UG/DL (ref 250–450)
UNSATURATED IRON BINDING CAPACITY: 360 UG/DL (ref 112–347)
VIT B12 SERPL-MCNC: 380 PG/ML (ref 232–1245)
WBC OTHER # BLD: 9.6 K/UL (ref 3.5–11.3)

## 2025-02-18 RX ORDER — DEXTROAMPHETAMINE SACCHARATE, AMPHETAMINE ASPARTATE MONOHYDRATE, DEXTROAMPHETAMINE SULFATE AND AMPHETAMINE SULFATE 1.25; 1.25; 1.25; 1.25 MG/1; MG/1; MG/1; MG/1
5 CAPSULE, EXTENDED RELEASE ORAL DAILY
Qty: 30 CAPSULE | Refills: 0 | Status: SHIPPED | OUTPATIENT
Start: 2025-02-18 | End: 2025-03-20

## 2025-02-18 SDOH — ECONOMIC STABILITY: FOOD INSECURITY: WITHIN THE PAST 12 MONTHS, YOU WORRIED THAT YOUR FOOD WOULD RUN OUT BEFORE YOU GOT MONEY TO BUY MORE.: NEVER TRUE

## 2025-02-18 SDOH — ECONOMIC STABILITY: FOOD INSECURITY: WITHIN THE PAST 12 MONTHS, THE FOOD YOU BOUGHT JUST DIDN'T LAST AND YOU DIDN'T HAVE MONEY TO GET MORE.: NEVER TRUE

## 2025-02-18 ASSESSMENT — ENCOUNTER SYMPTOMS
EYE REDNESS: 0
CHEST TIGHTNESS: 0
SHORTNESS OF BREATH: 0
TROUBLE SWALLOWING: 0
SINUS PRESSURE: 0
VOMITING: 0
COUGH: 0
EYE DISCHARGE: 0
SINUS PAIN: 0
SORE THROAT: 0
NAUSEA: 0
WHEEZING: 0
EYE ITCHING: 0
ABDOMINAL PAIN: 0
DIARRHEA: 0

## 2025-02-18 ASSESSMENT — PATIENT HEALTH QUESTIONNAIRE - PHQ9
4. FEELING TIRED OR HAVING LITTLE ENERGY: NOT AT ALL
8. MOVING OR SPEAKING SO SLOWLY THAT OTHER PEOPLE COULD HAVE NOTICED. OR THE OPPOSITE, BEING SO FIGETY OR RESTLESS THAT YOU HAVE BEEN MOVING AROUND A LOT MORE THAN USUAL: NOT AT ALL
SUM OF ALL RESPONSES TO PHQ9 QUESTIONS 1 & 2: 0
7. TROUBLE CONCENTRATING ON THINGS, SUCH AS READING THE NEWSPAPER OR WATCHING TELEVISION: NOT AT ALL
6. FEELING BAD ABOUT YOURSELF - OR THAT YOU ARE A FAILURE OR HAVE LET YOURSELF OR YOUR FAMILY DOWN: NOT AT ALL
5. POOR APPETITE OR OVEREATING: NOT AT ALL
1. LITTLE INTEREST OR PLEASURE IN DOING THINGS: NOT AT ALL
SUM OF ALL RESPONSES TO PHQ QUESTIONS 1-9: 0
2. FEELING DOWN, DEPRESSED OR HOPELESS: NOT AT ALL
9. THOUGHTS THAT YOU WOULD BE BETTER OFF DEAD, OR OF HURTING YOURSELF: NOT AT ALL
SUM OF ALL RESPONSES TO PHQ QUESTIONS 1-9: 0
3. TROUBLE FALLING OR STAYING ASLEEP: NOT AT ALL
SUM OF ALL RESPONSES TO PHQ QUESTIONS 1-9: 0
10. IF YOU CHECKED OFF ANY PROBLEMS, HOW DIFFICULT HAVE THESE PROBLEMS MADE IT FOR YOU TO DO YOUR WORK, TAKE CARE OF THINGS AT HOME, OR GET ALONG WITH OTHER PEOPLE: NOT DIFFICULT AT ALL
SUM OF ALL RESPONSES TO PHQ QUESTIONS 1-9: 0

## 2025-02-18 NOTE — PROGRESS NOTES
membrane normal.      Left Ear: Tympanic membrane normal.      Mouth/Throat:      Mouth: Mucous membranes are moist.   Eyes:      Extraocular Movements: Extraocular movements intact.   Cardiovascular:      Rate and Rhythm: Normal rate and regular rhythm.      Heart sounds: Normal heart sounds.   Pulmonary:      Effort: Pulmonary effort is normal.      Breath sounds: Normal breath sounds.   Musculoskeletal:      Cervical back: Normal range of motion and neck supple.   Skin:     General: Skin is warm and dry.   Neurological:      Mental Status: She is alert and oriented to person, place, and time.   Psychiatric:         Attention and Perception: Attention normal.         Mood and Affect: Mood normal.         Speech: Speech normal.         Behavior: Behavior normal. Behavior is cooperative.         Thought Content: Thought content normal.              Personalized Preventive Plan  Current Health Maintenance Status  Immunization History   Administered Date(s) Administered    Influenza, FLUARIX, FLULAVAL, FLUZONE (age 6 mo+) and AFLURIA, (age 3 y+), Quadv PF, 0.5mL 10/10/2019, 10/08/2020    Influenza, FLUCELVAX, (age 6 mo+), MDCK, Quadv PF, 0.5mL 12/01/2021    MMR, PRIORIX, M-M-R II, (age 12m+), SC, 0.5mL 05/29/2003    Pneumococcal, PPSV23, PNEUMOVAX 23, (age 2y+), SC/IM, 0.5mL 07/09/2020    TDaP, ADACEL (age 10y-64y), BOOSTRIX (age 10y+), IM, 0.5mL 11/07/2019, 01/13/2022        Health Maintenance Due   Topic Date Due    Varicella vaccine (1 of 2 - 13+ 2-dose series) Never done    Hepatitis B vaccine (1 of 3 - 19+ 3-dose series) Never done    Pneumococcal 0-49 years Vaccine (2 of 2 - PCV) 07/09/2021    Flu vaccine (1) 08/01/2024    COVID-19 Vaccine (1 - 2024-25 season) Never done      Recommendations for Preventive Services Due: see orders and patient instructions/AVS.    The patient (or guardian, if applicable) and other individuals in attendance with the patient were advised that Artificial Intelligence will be

## 2025-02-20 RX ORDER — FERROUS SULFATE 325(65) MG
325 TABLET ORAL
Qty: 90 TABLET | Refills: 1 | Status: SHIPPED | OUTPATIENT
Start: 2025-02-20

## 2025-02-20 RX ORDER — ERGOCALCIFEROL 1.25 MG/1
50000 CAPSULE, LIQUID FILLED ORAL WEEKLY
Qty: 12 CAPSULE | Refills: 1 | Status: SHIPPED | OUTPATIENT
Start: 2025-02-20

## 2025-03-05 ENCOUNTER — PATIENT MESSAGE (OUTPATIENT)
Dept: PRIMARY CARE CLINIC | Age: 35
End: 2025-03-05

## 2025-03-05 DIAGNOSIS — F17.200 SMOKER: Primary | ICD-10-CM

## 2025-03-06 RX ORDER — BUPROPION HYDROCHLORIDE 150 MG/1
TABLET, EXTENDED RELEASE ORAL
Qty: 60 TABLET | Refills: 2 | Status: SHIPPED | OUTPATIENT
Start: 2025-03-06

## 2025-04-03 ENCOUNTER — OFFICE VISIT (OUTPATIENT)
Dept: PRIMARY CARE CLINIC | Age: 35
End: 2025-04-03

## 2025-04-03 VITALS
DIASTOLIC BLOOD PRESSURE: 62 MMHG | RESPIRATION RATE: 12 BRPM | BODY MASS INDEX: 18.68 KG/M2 | HEART RATE: 93 BPM | SYSTOLIC BLOOD PRESSURE: 92 MMHG | OXYGEN SATURATION: 98 % | WEIGHT: 103.8 LBS

## 2025-04-03 DIAGNOSIS — F90.0 ATTENTION DEFICIT HYPERACTIVITY DISORDER (ADHD), PREDOMINANTLY INATTENTIVE TYPE: Primary | ICD-10-CM

## 2025-04-03 DIAGNOSIS — Z87.891 PERSONAL HISTORY OF TOBACCO USE, PRESENTING HAZARDS TO HEALTH: ICD-10-CM

## 2025-04-03 DIAGNOSIS — F17.200 SMOKER: ICD-10-CM

## 2025-04-03 DIAGNOSIS — F07.81 POST CONCUSSIVE SYNDROME: ICD-10-CM

## 2025-04-03 RX ORDER — DEXTROAMPHETAMINE SACCHARATE, AMPHETAMINE ASPARTATE MONOHYDRATE, DEXTROAMPHETAMINE SULFATE AND AMPHETAMINE SULFATE 1.25; 1.25; 1.25; 1.25 MG/1; MG/1; MG/1; MG/1
5 CAPSULE, EXTENDED RELEASE ORAL DAILY
Qty: 30 CAPSULE | Refills: 0 | Status: SHIPPED | OUTPATIENT
Start: 2025-04-03 | End: 2025-05-03

## 2025-04-03 RX ORDER — VARENICLINE TARTRATE 0.5 MG/1
TABLET, FILM COATED ORAL
Qty: 95 TABLET | Refills: 0 | Status: SHIPPED | OUTPATIENT
Start: 2025-04-03 | End: 2025-04-04

## 2025-04-03 SDOH — ECONOMIC STABILITY: FOOD INSECURITY: WITHIN THE PAST 12 MONTHS, THE FOOD YOU BOUGHT JUST DIDN'T LAST AND YOU DIDN'T HAVE MONEY TO GET MORE.: NEVER TRUE

## 2025-04-03 SDOH — ECONOMIC STABILITY: FOOD INSECURITY: WITHIN THE PAST 12 MONTHS, YOU WORRIED THAT YOUR FOOD WOULD RUN OUT BEFORE YOU GOT MONEY TO BUY MORE.: NEVER TRUE

## 2025-04-03 ASSESSMENT — ENCOUNTER SYMPTOMS
CHEST TIGHTNESS: 0
TROUBLE SWALLOWING: 0
ABDOMINAL PAIN: 0
NAUSEA: 0
EYE ITCHING: 0
COUGH: 0
DIARRHEA: 0
SINUS PRESSURE: 0
SORE THROAT: 0
SINUS PAIN: 0
WHEEZING: 0
SHORTNESS OF BREATH: 0
EYE REDNESS: 0
VOMITING: 0
EYE DISCHARGE: 0

## 2025-04-03 ASSESSMENT — PATIENT HEALTH QUESTIONNAIRE - PHQ9
9. THOUGHTS THAT YOU WOULD BE BETTER OFF DEAD, OR OF HURTING YOURSELF: NOT AT ALL
4. FEELING TIRED OR HAVING LITTLE ENERGY: NOT AT ALL
SUM OF ALL RESPONSES TO PHQ QUESTIONS 1-9: 0
7. TROUBLE CONCENTRATING ON THINGS, SUCH AS READING THE NEWSPAPER OR WATCHING TELEVISION: NOT AT ALL
1. LITTLE INTEREST OR PLEASURE IN DOING THINGS: NOT AT ALL
3. TROUBLE FALLING OR STAYING ASLEEP: NOT AT ALL
SUM OF ALL RESPONSES TO PHQ QUESTIONS 1-9: 0
2. FEELING DOWN, DEPRESSED OR HOPELESS: NOT AT ALL
5. POOR APPETITE OR OVEREATING: NOT AT ALL
8. MOVING OR SPEAKING SO SLOWLY THAT OTHER PEOPLE COULD HAVE NOTICED. OR THE OPPOSITE, BEING SO FIGETY OR RESTLESS THAT YOU HAVE BEEN MOVING AROUND A LOT MORE THAN USUAL: NOT AT ALL
6. FEELING BAD ABOUT YOURSELF - OR THAT YOU ARE A FAILURE OR HAVE LET YOURSELF OR YOUR FAMILY DOWN: NOT AT ALL
10. IF YOU CHECKED OFF ANY PROBLEMS, HOW DIFFICULT HAVE THESE PROBLEMS MADE IT FOR YOU TO DO YOUR WORK, TAKE CARE OF THINGS AT HOME, OR GET ALONG WITH OTHER PEOPLE: NOT DIFFICULT AT ALL
SUM OF ALL RESPONSES TO PHQ QUESTIONS 1-9: 0
SUM OF ALL RESPONSES TO PHQ QUESTIONS 1-9: 0

## 2025-04-03 NOTE — PROGRESS NOTES
BLOOD TRANSFUSIONS, VARICOSE VEINS    High Cholesterol Mother     Pancreatic Cancer Paternal Grandfather     Lung Cancer Paternal Grandfather        Social History     Tobacco Use    Smoking status: Every Day     Current packs/day: 0.00     Average packs/day: 0.5 packs/day for 18.6 years (9.3 ttl pk-yrs)     Types: Cigarettes     Start date: 2002     Last attempt to quit: 1/10/2021     Years since quittin.2    Smokeless tobacco: Never    Tobacco comments:     quit 8 months ago- 21   Substance Use Topics    Alcohol use: Not Currently     Comment: occasionally      Current Outpatient Medications   Medication Sig Dispense Refill    amphetamine-dextroamphetamine (ADDERALL XR) 5 MG extended release capsule Take 1 capsule by mouth daily for 30 days. Max Daily Amount: 5 mg 30 capsule 0    varenicline (CHANTIX) 0.5 MG tablet 0.5 mg po q am x 3 days. Then, 0.5 mg bid x 4 days. Then, one 1 mg bid x 15 weeks 95 tablet 0    vitamin D (ERGOCALCIFEROL) 1.25 MG (95330 UT) CAPS capsule Take 1 capsule by mouth once a week 12 capsule 1    ferrous sulfate (IRON 325) 325 (65 Fe) MG tablet Take 1 tablet by mouth daily (with breakfast) 90 tablet 1     No current facility-administered medications for this visit.     Allergies   Allergen Reactions    Arestin [Minocycline] Hives       Health Maintenance   Topic Date Due    Varicella vaccine (1 of 2 - 13+ 2-dose series) Never done    Hepatitis B vaccine (1 of 3 - 19+ 3-dose series) Never done    Pneumococcal 0-49 years Vaccine (2 of 2 - PCV) 2021    COVID-19 Vaccine (2024- season) Never done    Flu vaccine (Season Ended) 2025    Depression Monitoring  2026    Cervical cancer screen  2028    DTaP/Tdap/Td vaccine (3 - Td or Tdap) 2032    Hepatitis C screen  Completed    HIV screen  Completed    Hepatitis A vaccine  Aged Out    Hib vaccine  Aged Out    HPV vaccine  Aged Out    Polio vaccine  Aged Out    Meningococcal (ACWY) vaccine  Aged Out

## 2025-04-04 DIAGNOSIS — F17.200 SMOKER: ICD-10-CM

## 2025-04-04 RX ORDER — VARENICLINE TARTRATE 0.5 MG/1
TABLET, FILM COATED ORAL
Qty: 431 TABLET | Refills: 0 | Status: SHIPPED | OUTPATIENT
Start: 2025-04-04

## 2025-04-07 RX ORDER — VARENICLINE TARTRATE 1 MG/1
1 TABLET, FILM COATED ORAL 2 TIMES DAILY
Qty: 180 TABLET | Refills: 1 | Status: SHIPPED | OUTPATIENT
Start: 2025-04-07

## 2025-04-07 NOTE — TELEPHONE ENCOUNTER
Kt Emanuel at [facility] pharmacy reports they filled script for the first 7 days of the 0.5 mg. Pharmacy is requesting a new script for 1 mg BID for the remaining 14 weeks. This is the only way insurance will pay.     Please advise.

## 2025-05-22 DIAGNOSIS — F90.0 ATTENTION DEFICIT HYPERACTIVITY DISORDER (ADHD), PREDOMINANTLY INATTENTIVE TYPE: ICD-10-CM

## 2025-05-22 RX ORDER — DEXTROAMPHETAMINE SACCHARATE, AMPHETAMINE ASPARTATE MONOHYDRATE, DEXTROAMPHETAMINE SULFATE AND AMPHETAMINE SULFATE 1.25; 1.25; 1.25; 1.25 MG/1; MG/1; MG/1; MG/1
5 CAPSULE, EXTENDED RELEASE ORAL DAILY
Qty: 30 CAPSULE | Refills: 0 | Status: SHIPPED | OUTPATIENT
Start: 2025-05-22 | End: 2025-06-21

## 2025-05-22 NOTE — TELEPHONE ENCOUNTER
Last OV:  4/3/2025    Next OV: 7/3/2025    Pharmacy:   [facility] - [place], [place] - [address] - P [phone] - F [phone]  [address] [place] [id number]  Phone: [phone] Fax: [phone]       Confirmed? Yes

## 2025-07-03 ENCOUNTER — OFFICE VISIT (OUTPATIENT)
Dept: PRIMARY CARE CLINIC | Age: 35
End: 2025-07-03

## 2025-07-03 VITALS
RESPIRATION RATE: 12 BRPM | DIASTOLIC BLOOD PRESSURE: 54 MMHG | HEART RATE: 88 BPM | WEIGHT: 103.4 LBS | SYSTOLIC BLOOD PRESSURE: 90 MMHG | BODY MASS INDEX: 18.61 KG/M2 | OXYGEN SATURATION: 97 %

## 2025-07-03 DIAGNOSIS — E55.9 VITAMIN D DEFICIENCY: ICD-10-CM

## 2025-07-03 DIAGNOSIS — E05.90 HYPERTHYROIDISM: ICD-10-CM

## 2025-07-03 DIAGNOSIS — E53.8 VITAMIN B 12 DEFICIENCY: ICD-10-CM

## 2025-07-03 DIAGNOSIS — F90.0 ATTENTION DEFICIT HYPERACTIVITY DISORDER (ADHD), PREDOMINANTLY INATTENTIVE TYPE: Primary | ICD-10-CM

## 2025-07-03 RX ORDER — DEXTROAMPHETAMINE SACCHARATE, AMPHETAMINE ASPARTATE, DEXTROAMPHETAMINE SULFATE AND AMPHETAMINE SULFATE 1.25; 1.25; 1.25; 1.25 MG/1; MG/1; MG/1; MG/1
5 TABLET ORAL DAILY
Qty: 30 TABLET | Refills: 0 | Status: SHIPPED | OUTPATIENT
Start: 2025-07-03 | End: 2025-08-02

## 2025-07-03 RX ORDER — DEXTROAMPHETAMINE SACCHARATE, AMPHETAMINE ASPARTATE MONOHYDRATE, DEXTROAMPHETAMINE SULFATE AND AMPHETAMINE SULFATE 1.25; 1.25; 1.25; 1.25 MG/1; MG/1; MG/1; MG/1
5 CAPSULE, EXTENDED RELEASE ORAL DAILY
Qty: 30 CAPSULE | Refills: 0 | Status: SHIPPED | OUTPATIENT
Start: 2025-07-03 | End: 2025-08-02

## 2025-07-03 SDOH — ECONOMIC STABILITY: FOOD INSECURITY: WITHIN THE PAST 12 MONTHS, YOU WORRIED THAT YOUR FOOD WOULD RUN OUT BEFORE YOU GOT MONEY TO BUY MORE.: NEVER TRUE

## 2025-07-03 SDOH — ECONOMIC STABILITY: FOOD INSECURITY: WITHIN THE PAST 12 MONTHS, THE FOOD YOU BOUGHT JUST DIDN'T LAST AND YOU DIDN'T HAVE MONEY TO GET MORE.: NEVER TRUE

## 2025-07-03 ASSESSMENT — PATIENT HEALTH QUESTIONNAIRE - PHQ9
SUM OF ALL RESPONSES TO PHQ QUESTIONS 1-9: 0
10. IF YOU CHECKED OFF ANY PROBLEMS, HOW DIFFICULT HAVE THESE PROBLEMS MADE IT FOR YOU TO DO YOUR WORK, TAKE CARE OF THINGS AT HOME, OR GET ALONG WITH OTHER PEOPLE: NOT DIFFICULT AT ALL
7. TROUBLE CONCENTRATING ON THINGS, SUCH AS READING THE NEWSPAPER OR WATCHING TELEVISION: NOT AT ALL
4. FEELING TIRED OR HAVING LITTLE ENERGY: NOT AT ALL
5. POOR APPETITE OR OVEREATING: NOT AT ALL
9. THOUGHTS THAT YOU WOULD BE BETTER OFF DEAD, OR OF HURTING YOURSELF: NOT AT ALL
1. LITTLE INTEREST OR PLEASURE IN DOING THINGS: NOT AT ALL
2. FEELING DOWN, DEPRESSED OR HOPELESS: NOT AT ALL
SUM OF ALL RESPONSES TO PHQ QUESTIONS 1-9: 0
8. MOVING OR SPEAKING SO SLOWLY THAT OTHER PEOPLE COULD HAVE NOTICED. OR THE OPPOSITE, BEING SO FIGETY OR RESTLESS THAT YOU HAVE BEEN MOVING AROUND A LOT MORE THAN USUAL: NOT AT ALL
3. TROUBLE FALLING OR STAYING ASLEEP: NOT AT ALL
6. FEELING BAD ABOUT YOURSELF - OR THAT YOU ARE A FAILURE OR HAVE LET YOURSELF OR YOUR FAMILY DOWN: NOT AT ALL

## 2025-07-03 ASSESSMENT — ENCOUNTER SYMPTOMS
COUGH: 0
NAUSEA: 0
EYE REDNESS: 0
EYE ITCHING: 0
WHEEZING: 0
SINUS PRESSURE: 0
DIARRHEA: 0
SINUS PAIN: 0
SHORTNESS OF BREATH: 0
VOMITING: 0
CHEST TIGHTNESS: 0
EYE DISCHARGE: 0
ABDOMINAL PAIN: 0
TROUBLE SWALLOWING: 0
SORE THROAT: 0

## 2025-07-03 NOTE — PROGRESS NOTES
Hepatitis C screen  Completed    HIV screen  Completed    Hepatitis A vaccine  Aged Out    Hib vaccine  Aged Out    HPV vaccine  Aged Out    Polio vaccine  Aged Out    Meningococcal (ACWY) vaccine  Aged Out    Meningococcal B vaccine  Aged Out       Subjective:      Review of Systems   Constitutional:  Negative for chills, fatigue and fever.   HENT:  Negative for ear discharge, ear pain, sinus pressure, sinus pain, sore throat and trouble swallowing.    Eyes:  Negative for discharge, redness and itching.   Respiratory:  Negative for cough, chest tightness, shortness of breath and wheezing.    Cardiovascular:  Negative for chest pain.   Gastrointestinal:  Negative for abdominal pain, diarrhea, nausea and vomiting.   Genitourinary:  Negative for difficulty urinating.   Musculoskeletal:  Negative for arthralgias and neck pain.   Skin:  Negative for rash.   Neurological:  Negative for dizziness, weakness, light-headedness and headaches.   All other systems reviewed and are negative.      Objective:   BP (!) 90/54 (BP Site: Right Upper Arm, Patient Position: Sitting, BP Cuff Size: Medium Adult)   Pulse 88   Resp 12   Wt 46.9 kg (103 lb 6.4 oz)   SpO2 97%   BMI 18.61 kg/m²     Physical Exam      Physical Exam  Constitutional:       General: She is not in acute distress.     Appearance: Normal appearance. She is normal weight. She is not ill-appearing.   HENT:      Head: Normocephalic and atraumatic.      Nose: Nose normal. No congestion or rhinorrhea.      Mouth/Throat:      Mouth: Mucous membranes are moist.      Pharynx: Oropharynx is clear. No oropharyngeal exudate or posterior oropharyngeal erythema.   Eyes:      Extraocular Movements: Extraocular movements intact.      Conjunctiva/sclera: Conjunctivae normal.      Pupils: Pupils are equal, round, and reactive to light.   Cardiovascular:      Rate and Rhythm: Normal rate and regular rhythm.      Pulses: Normal pulses.      Heart sounds: Normal heart sounds. No 
no

## 2025-07-28 RX ORDER — ERGOCALCIFEROL 1.25 MG/1
50000 CAPSULE, LIQUID FILLED ORAL WEEKLY
Qty: 12 CAPSULE | Refills: 0 | Status: SHIPPED | OUTPATIENT
Start: 2025-07-28

## 2025-08-16 RX ORDER — PNV NO.95/FERROUS FUM/FOLIC AC 28MG-0.8MG
1 TABLET ORAL
Qty: 90 TABLET | Refills: 0 | Status: SHIPPED | OUTPATIENT
Start: 2025-08-16

## (undated) DEVICE — SURGICAL PROCEDURE PACK C SECT ST CHARLES SCMH

## (undated) DEVICE — CATHETERIZATION KIT PEDIATRIC 16 FR 5 CC INDWL INF CTRL

## (undated) DEVICE — SUTURE VCRL SZ 4-0 L27IN ABSRB UD L19MM FS-2 3/8 CIR REV J422H

## (undated) DEVICE — DISCONTINUED USE 112613 SWABSTICK ADHESIVE  BENZOIN TINCTURE LF

## (undated) DEVICE — MEDI-VAC YANK SUCT HNDL W/TPRD BULBOUS TIP & NON-CONDUCTIVE: Brand: CARDINAL HEALTH

## (undated) DEVICE — SUTURE VCRL SZ 0 L36IN ABSRB UD L36MM CT-1 1/2 CIR J946H

## (undated) DEVICE — SUTURE ABSORBABLE BRAIDED 2-0 CT-1 27 IN UD VICRYL J259H

## (undated) DEVICE — GLOVE ORANGE PI 7   MSG9070

## (undated) DEVICE — SOLUTION IRRIG 1500ML STRL H2O USP POUR PLAS BTL

## (undated) DEVICE — SURGICAL PROCEDURE PACK C SECT B

## (undated) DEVICE — CHLORAPREP 26ML ORANGE

## (undated) DEVICE — KENDALL SCD EXPRESS SLEEVES, KNEE LENGTH, MEDIUM: Brand: KENDALL SCD